# Patient Record
Sex: MALE | Race: BLACK OR AFRICAN AMERICAN | NOT HISPANIC OR LATINO
[De-identification: names, ages, dates, MRNs, and addresses within clinical notes are randomized per-mention and may not be internally consistent; named-entity substitution may affect disease eponyms.]

---

## 2017-07-19 ENCOUNTER — OTHER (OUTPATIENT)
Age: 82
End: 2017-07-19

## 2019-03-24 ENCOUNTER — INPATIENT (INPATIENT)
Facility: HOSPITAL | Age: 84
LOS: 4 days | Discharge: NURSING, REHAB OR LONG-TERM CARE | End: 2019-03-29
Attending: INTERNAL MEDICINE | Admitting: INTERNAL MEDICINE
Payer: MEDICARE

## 2019-03-24 VITALS
SYSTOLIC BLOOD PRESSURE: 122 MMHG | WEIGHT: 220.02 LBS | DIASTOLIC BLOOD PRESSURE: 70 MMHG | HEART RATE: 69 BPM | RESPIRATION RATE: 20 BRPM | OXYGEN SATURATION: 100 % | HEIGHT: 71 IN | TEMPERATURE: 98 F

## 2019-03-24 DIAGNOSIS — R60.1 GENERALIZED EDEMA: ICD-10-CM

## 2019-03-24 DIAGNOSIS — I10 ESSENTIAL (PRIMARY) HYPERTENSION: ICD-10-CM

## 2019-03-24 DIAGNOSIS — Z98.89 OTHER SPECIFIED POSTPROCEDURAL STATES: Chronic | ICD-10-CM

## 2019-03-24 DIAGNOSIS — M19.90 UNSPECIFIED OSTEOARTHRITIS, UNSPECIFIED SITE: ICD-10-CM

## 2019-03-24 LAB
ALBUMIN SERPL ELPH-MCNC: 3.4 G/DL — SIGNIFICANT CHANGE UP (ref 3.3–5)
ALP SERPL-CCNC: 72 U/L — SIGNIFICANT CHANGE UP (ref 40–120)
ALT FLD-CCNC: 17 U/L — SIGNIFICANT CHANGE UP (ref 12–78)
ANION GAP SERPL CALC-SCNC: 7 MMOL/L — SIGNIFICANT CHANGE UP (ref 5–17)
AST SERPL-CCNC: 18 U/L — SIGNIFICANT CHANGE UP (ref 15–37)
BASE EXCESS BLDA CALC-SCNC: 1 MMOL/L — SIGNIFICANT CHANGE UP (ref -2–2)
BASOPHILS # BLD AUTO: 0.03 K/UL — SIGNIFICANT CHANGE UP (ref 0–0.2)
BASOPHILS NFR BLD AUTO: 0.6 % — SIGNIFICANT CHANGE UP (ref 0–2)
BILIRUB SERPL-MCNC: 1 MG/DL — SIGNIFICANT CHANGE UP (ref 0.2–1.2)
BLD GP AB SCN SERPL QL: SIGNIFICANT CHANGE UP
BLOOD GAS COMMENTS: SIGNIFICANT CHANGE UP
BLOOD GAS COMMENTS: SIGNIFICANT CHANGE UP
BLOOD GAS SOURCE: SIGNIFICANT CHANGE UP
BUN SERPL-MCNC: 10 MG/DL — SIGNIFICANT CHANGE UP (ref 7–23)
CALCIUM SERPL-MCNC: 8.5 MG/DL — SIGNIFICANT CHANGE UP (ref 8.5–10.1)
CHLORIDE SERPL-SCNC: 105 MMOL/L — SIGNIFICANT CHANGE UP (ref 96–108)
CK MB CFR SERPL CALC: <1 NG/ML — SIGNIFICANT CHANGE UP (ref 0.5–3.6)
CO2 SERPL-SCNC: 28 MMOL/L — SIGNIFICANT CHANGE UP (ref 22–31)
CREAT SERPL-MCNC: 0.98 MG/DL — SIGNIFICANT CHANGE UP (ref 0.5–1.3)
EOSINOPHIL # BLD AUTO: 0.39 K/UL — SIGNIFICANT CHANGE UP (ref 0–0.5)
EOSINOPHIL NFR BLD AUTO: 7.4 % — HIGH (ref 0–6)
GLUCOSE SERPL-MCNC: 131 MG/DL — HIGH (ref 70–99)
HCO3 BLDA-SCNC: 26 MMOL/L — SIGNIFICANT CHANGE UP (ref 21–29)
HCT VFR BLD CALC: 47.6 % — SIGNIFICANT CHANGE UP (ref 39–50)
HGB BLD-MCNC: 14.9 G/DL — SIGNIFICANT CHANGE UP (ref 13–17)
HOROWITZ INDEX BLDA+IHG-RTO: 40 — SIGNIFICANT CHANGE UP
IMM GRANULOCYTES NFR BLD AUTO: 0.2 % — SIGNIFICANT CHANGE UP (ref 0–1.5)
INR BLD: 1.94 RATIO — HIGH (ref 0.88–1.16)
LYMPHOCYTES # BLD AUTO: 2.31 K/UL — SIGNIFICANT CHANGE UP (ref 1–3.3)
LYMPHOCYTES # BLD AUTO: 43.8 % — SIGNIFICANT CHANGE UP (ref 13–44)
MAGNESIUM SERPL-MCNC: 2.4 MG/DL — SIGNIFICANT CHANGE UP (ref 1.6–2.6)
MCHC RBC-ENTMCNC: 27.4 PG — SIGNIFICANT CHANGE UP (ref 27–34)
MCHC RBC-ENTMCNC: 31.3 GM/DL — LOW (ref 32–36)
MCV RBC AUTO: 87.5 FL — SIGNIFICANT CHANGE UP (ref 80–100)
MONOCYTES # BLD AUTO: 0.65 K/UL — SIGNIFICANT CHANGE UP (ref 0–0.9)
MONOCYTES NFR BLD AUTO: 12.3 % — SIGNIFICANT CHANGE UP (ref 2–14)
NEUTROPHILS # BLD AUTO: 1.88 K/UL — SIGNIFICANT CHANGE UP (ref 1.8–7.4)
NEUTROPHILS NFR BLD AUTO: 35.7 % — LOW (ref 43–77)
NRBC # BLD: 0 /100 WBCS — SIGNIFICANT CHANGE UP (ref 0–0)
NT-PROBNP SERPL-SCNC: 67 PG/ML — SIGNIFICANT CHANGE UP (ref 0–450)
PCO2 BLDA: 45 MMHG — SIGNIFICANT CHANGE UP (ref 32–46)
PH BLD: 7.38 — SIGNIFICANT CHANGE UP (ref 7.35–7.45)
PLATELET # BLD AUTO: 180 K/UL — SIGNIFICANT CHANGE UP (ref 150–400)
PO2 BLDA: 141 MMHG — HIGH (ref 74–108)
POTASSIUM SERPL-MCNC: 4 MMOL/L — SIGNIFICANT CHANGE UP (ref 3.5–5.3)
POTASSIUM SERPL-SCNC: 4 MMOL/L — SIGNIFICANT CHANGE UP (ref 3.5–5.3)
PROT SERPL-MCNC: 7.4 GM/DL — SIGNIFICANT CHANGE UP (ref 6–8.3)
PROTHROM AB SERPL-ACNC: 22.2 SEC — HIGH (ref 10–12.9)
RBC # BLD: 5.44 M/UL — SIGNIFICANT CHANGE UP (ref 4.2–5.8)
RBC # FLD: 15.7 % — HIGH (ref 10.3–14.5)
SAO2 % BLDA: 99 % — HIGH (ref 92–96)
SODIUM SERPL-SCNC: 140 MMOL/L — SIGNIFICANT CHANGE UP (ref 135–145)
TROPONIN I SERPL-MCNC: <.015 NG/ML — SIGNIFICANT CHANGE UP (ref 0.01–0.04)
TSH SERPL-MCNC: 2.14 UIU/ML — SIGNIFICANT CHANGE UP (ref 0.36–3.74)
WBC # BLD: 5.27 K/UL — SIGNIFICANT CHANGE UP (ref 3.8–10.5)
WBC # FLD AUTO: 5.27 K/UL — SIGNIFICANT CHANGE UP (ref 3.8–10.5)

## 2019-03-24 PROCEDURE — 71045 X-RAY EXAM CHEST 1 VIEW: CPT | Mod: 26

## 2019-03-24 PROCEDURE — 99285 EMERGENCY DEPT VISIT HI MDM: CPT

## 2019-03-24 PROCEDURE — 73502 X-RAY EXAM HIP UNI 2-3 VIEWS: CPT | Mod: 26,RT

## 2019-03-24 PROCEDURE — 93010 ELECTROCARDIOGRAM REPORT: CPT

## 2019-03-24 PROCEDURE — 93970 EXTREMITY STUDY: CPT | Mod: 26

## 2019-03-24 RX ORDER — ACETAMINOPHEN 500 MG
975 TABLET ORAL ONCE
Qty: 0 | Refills: 0 | Status: COMPLETED | OUTPATIENT
Start: 2019-03-24 | End: 2019-03-24

## 2019-03-24 RX ORDER — IPRATROPIUM/ALBUTEROL SULFATE 18-103MCG
3 AEROSOL WITH ADAPTER (GRAM) INHALATION ONCE
Qty: 0 | Refills: 0 | Status: COMPLETED | OUTPATIENT
Start: 2019-03-24 | End: 2019-03-24

## 2019-03-24 RX ORDER — ALBUTEROL 90 UG/1
2.5 AEROSOL, METERED ORAL ONCE
Qty: 0 | Refills: 0 | Status: COMPLETED | OUTPATIENT
Start: 2019-03-24 | End: 2019-03-24

## 2019-03-24 RX ORDER — TRAMADOL HYDROCHLORIDE 50 MG/1
50 TABLET ORAL
Qty: 0 | Refills: 0 | Status: DISCONTINUED | OUTPATIENT
Start: 2019-03-24 | End: 2019-03-29

## 2019-03-24 RX ORDER — CARVEDILOL PHOSPHATE 80 MG/1
3.12 CAPSULE, EXTENDED RELEASE ORAL EVERY 12 HOURS
Qty: 0 | Refills: 0 | Status: DISCONTINUED | OUTPATIENT
Start: 2019-03-24 | End: 2019-03-29

## 2019-03-24 RX ORDER — HEPARIN SODIUM 5000 [USP'U]/ML
5000 INJECTION INTRAVENOUS; SUBCUTANEOUS EVERY 12 HOURS
Qty: 0 | Refills: 0 | Status: DISCONTINUED | OUTPATIENT
Start: 2019-03-24 | End: 2019-03-29

## 2019-03-24 RX ORDER — ACETAMINOPHEN 500 MG
650 TABLET ORAL EVERY 6 HOURS
Qty: 0 | Refills: 0 | Status: DISCONTINUED | OUTPATIENT
Start: 2019-03-24 | End: 2019-03-29

## 2019-03-24 RX ORDER — AMLODIPINE BESYLATE 2.5 MG/1
5 TABLET ORAL DAILY
Qty: 0 | Refills: 0 | Status: DISCONTINUED | OUTPATIENT
Start: 2019-03-24 | End: 2019-03-24

## 2019-03-24 RX ORDER — FUROSEMIDE 40 MG
40 TABLET ORAL ONCE
Qty: 0 | Refills: 0 | Status: COMPLETED | OUTPATIENT
Start: 2019-03-24 | End: 2019-03-24

## 2019-03-24 RX ADMIN — ALBUTEROL 2.5 MILLIGRAM(S): 90 AEROSOL, METERED ORAL at 12:40

## 2019-03-24 RX ADMIN — Medication 40 MILLIGRAM(S): at 14:05

## 2019-03-24 RX ADMIN — Medication 3 MILLILITER(S): at 12:40

## 2019-03-24 RX ADMIN — TRAMADOL HYDROCHLORIDE 50 MILLIGRAM(S): 50 TABLET ORAL at 23:20

## 2019-03-24 RX ADMIN — Medication 975 MILLIGRAM(S): at 14:06

## 2019-03-24 NOTE — PHYSICAL THERAPY INITIAL EVALUATION ADULT - GENERAL OBSERVATIONS, REHAB EVAL
Patient supine in stretcher bed. + Cardiac monitor in place. Vital signs: 137/77 (), 70 HR, SaO2 96% on room air, denies shortness of breath at rest (Whitney RPE 0/10). AOx1.

## 2019-03-24 NOTE — ED PROVIDER NOTE - CLINICAL SUMMARY MEDICAL DECISION MAKING FREE TEXT BOX
pt pw leg pain. bl le edema. needs dvt rule out but likely chf. will diurese. not warm enough to suggest cellulitis. I read ekg as sinus rate 62 with first degree av block, left axis deviation, inferior q waves, no st elevation or depression, normal qtc and pr, qrs narrow. pt pw leg pain. bl le edema. needs dvt rule out but likely chf. will diurese. not warm enough to suggest cellulitis. I read ekg as sinus rate 62 with first degree av block, left axis deviation, inferior q waves, no st elevation or depression, normal qtc and pr, qrs narrow. labs reassuring. patient seen by PT and they recommend rehab. will admit.

## 2019-03-24 NOTE — H&P ADULT - NSICDXPASTMEDICALHX_GEN_ALL_CORE_FT
PAST MEDICAL HISTORY:  Cholelithiasis     Dementia     Depression     DVT (deep venous thrombosis) bilateral, on coumadin    HLD (hyperlipidemia)     Hypertension     OA (osteoarthritis)

## 2019-03-24 NOTE — PHYSICAL THERAPY INITIAL EVALUATION ADULT - PLANNED THERAPY INTERVENTIONS, PT EVAL
postural re-education/gait training/balance training/bed mobility training/ROM/stretching/strengthening/transfer training

## 2019-03-24 NOTE — PHYSICAL THERAPY INITIAL EVALUATION ADULT - MODALITIES TREATMENT COMMENTS
Whitney RPE at rest on room air: 96%, Whitney RPE after activity c evident dyspnea on exertion: 2/10.

## 2019-03-24 NOTE — PHYSICAL THERAPY INITIAL EVALUATION ADULT - GAIT TRAINING, PT EVAL
In 4 weeks, patient will independently ambulate c use of appropriate mobility device in and out of the house, 300 ft or more.

## 2019-03-24 NOTE — PHYSICAL THERAPY INITIAL EVALUATION ADULT - CRITERIA FOR SKILLED THERAPEUTIC INTERVENTIONS
rehab potential/anticipated equipment needs at discharge/anticipated discharge recommendation/impairments found/functional limitations in following categories/risk reduction/prevention

## 2019-03-24 NOTE — PHYSICAL THERAPY INITIAL EVALUATION ADULT - PERTINENT HX OF CURRENT PROBLEM, REHAB EVAL
Patient brought to [facility]-ED due to reports of 1 wk of right leg pain and swelling. Chart reviewed and noted Chest XRay showing increased cardiac silhouette; INR raised (noted hx of IVC filter), US doppler lower extremities ruled out DVT; Hip XRay ruled out fractures/dislocations. Noted left anterior shin open skin tear with minimal serous discharge.

## 2019-03-24 NOTE — PATIENT PROFILE ADULT - NSASFALLNEEDSASSISTWITH_GEN_A_NUR
Patient arrived at clinic today for a blood pressure check.  Blood pressure taken per protocol.     Patient states that she has some pressure in her left ear. She states that she has some sinus pressure and is coughing up green mucous.   RAMÓN Sandhu    Caller: Unspecified (Today,  9:30 AM)                Would like her to return to clinic for recheck of blood pressure. Orthostatics. Is the dizziness improved at all from when she came in?         128/60 Supine  132/62 Sitting  136/62 Standing     standing/toileting/walking

## 2019-03-24 NOTE — ED ADULT TRIAGE NOTE - CHIEF COMPLAINT QUOTE
ems states, " pt with bilateral leg pain and right hip pain , with bilateral swelling and pain for 1 week , denies sob, with some chest discomfort "

## 2019-03-24 NOTE — H&P ADULT - HISTORY OF PRESENT ILLNESS
patient  reports  L  leg  foot  pain  for  several weeks  worse  past  several  days  evaluated  in  Er  found  to be in  CHF  admitted  for  further  w/u  and treatmenty

## 2019-03-24 NOTE — PHYSICAL THERAPY INITIAL EVALUATION ADULT - IMPAIRMENTS FOUND, PT EVAL
joint integrity and mobility/muscle strength/ROM/gait, locomotion, and balance/aerobic capacity/endurance/integumentary integrity/ventilation and respiration/gas exchange

## 2019-03-24 NOTE — H&P ADULT - NSHPPHYSICALEXAM_GEN_ALL_CORE
PHYSICAL EXAM:    GENERAL: NAD, well-groomed, well-developed  HEAD:  Atraumatic, Normocephalic  EYES: EOMI, PERRLA, conjunctiva and sclera clear  ENMT: No tonsillar erythema, exudates, or enlargement; Moist mucous membranes, , No lesions  NECK: Supple, No JVD, Normal thyroid  NERVOUS SYSTEM:  Alert & Oriented X 2  no  focal  deficits  CHEST/LUNG: Clear  bilaterally; No rales, rhonchi, wheezing, or rubs  HEART: Regular rate and rhythm; No murmurs, rubs, or gallops  ABDOMEN: Soft, Nontender, Nondistended; no  masses Bowel sounds present  EXTREMITIES:  + Peripheral Pulses, No clubbing, cyanosis, or edema  LYMPH: No lymphadenopathy noted   RECTAL: deferred

## 2019-03-24 NOTE — H&P ADULT - NSHPLABSRESULTS_GEN_ALL_CORE
LABS:                        14.9   5.27  )-----------( 180      ( 24 Mar 2019 13:17 )             47.6     03-24    140  |  105  |  10  ----------------------------<  131<H>  4.0   |  28  |  0.98    Ca    8.5      24 Mar 2019 14:17  Mg     2.4     03-24    TPro  7.4  /  Alb  3.4  /  TBili  1.0  /  DBili  x   /  AST  18  /  ALT  17  /  AlkPhos  72  03-24    PT/INR - ( 24 Mar 2019 14:17 )   PT: 22.2 sec;   INR: 1.94 ratio

## 2019-03-24 NOTE — ED PROVIDER NOTE - OBJECTIVE STATEMENT
Pertinent PMH/PSH/FHx/SHx and Review of Systems contained within:  89m who denies med hx pw hip pain and leg pain ongoing for approx 1 week. he notes he lives with family. denies ha, vision loss, rhinorrhea, cp, sob, rash, bleeding, weakness, fever, chills, dysuria, vomiting. nothing has been given for pain  Fh and Sh not otherwise contributory  ROS otherwise negative

## 2019-03-24 NOTE — PHYSICAL THERAPY INITIAL EVALUATION ADULT - FINE MOTOR COORDINATION EXAM
Right UE/but with intention tremors noted while holding on rolling walker during gait assessment/Left UE

## 2019-03-24 NOTE — H&P ADULT - ASSESSMENT
IMPROVE VTE Individual Risk Assessment    RISK                                                                Points    [ x ] Previous VTE                                                  3    [  ] Thrombophilia                                               2    [  ] Lower limb paralysis                                      2        (unable to hold up >15 seconds)      [  ] Current Cancer                                              2         (within 6 months)    [ x ] Immobilization > 24 hrs                                1    [  ] ICU/CCU stay > 24 hours                              1    [ x ] Age > 60                                                      1    IMPROVE VTE Score _____5____

## 2019-03-24 NOTE — PHYSICAL THERAPY INITIAL EVALUATION ADULT - SKIN INTEGRITY
skin tear/to anterior shin, approximately measuring 1x1 cm -- serous discharge, no foul odor with grossly edematous legs

## 2019-03-24 NOTE — CONSULT NOTE ADULT - SUBJECTIVE AND OBJECTIVE BOX
HPI:  HPI:  patient  reports  L  leg  foot  pain  for  several weeks  worse  past  several  days  evaluated  in  [facility]  found  to be in  CHF  admitted  for  further  w/u  and treatmenty (24 Mar 2019 18:17)      Chief Complaint:  Patient is a 89y old  Male who presents with a chief complaint of congestive  heart  failure leg  pain  unsteady  gait (24 Mar 2019 18:17)      Review of Systems:    General:  No wt loss, fevers, chills, night sweats  Eyes:  Good vision, no reported pain  ENT:  No sore throat, pain, runny nose, dysphagia  CV:  No pain, palpitations, hypo/hypertension  Resp:  No dyspnea, cough, tachypnea, wheezing  GI:  No pain, nausea, vomiting, diarrhea, constipation  :  No pain, bleeding, incontinence, nocturia  Muscle:  No pain, weakness         Social History/Family History  SOCHX:   tobacco,  -  alcohol    FMHX: FA/MO  - contributory       Discussed with:  PMD, Family    Physical Exam:    Vital Signs:  Vital Signs Last 24 Hrs  T(C): 36.7 (24 Mar 2019 18:20), Max: 36.7 (24 Mar 2019 18:20)  T(F): 98.1 (24 Mar 2019 18:20), Max: 98.1 (24 Mar 2019 18:20)  HR: 70 (24 Mar 2019 18:20) (69 - 72)  BP: 120/65 (24 Mar 2019 18:20) (120/65 - 140/70)  BP(mean): --  RR: 18 (24 Mar 2019 18:20) (11 - 20)  SpO2: 98% (24 Mar 2019 18:20) (98% - 100%)  Daily Height in cm: 180.34 (24 Mar 2019 12:16)    Daily I&O's Summary        HEENT:  NC/AT, patent nares w/ pink mucosa, OP clear w/o lesions, PERRL, EOMI, conjunctivae clear, no thyromegaly, nodules, adenopathy, no JVD  Chest:  Full & symmetric excursion, no increased effort, breath sounds clear  Cardiovascular:  Regular rhythm, S1, S2, no murmur/rub/S3/S4, n edema  Abdomen:  Soft, non-tender, non-distended, normoactive bowel sounds, no HSM      Laboratory:                          14.9   5.27  )-----------( 180      ( 24 Mar 2019 13:17 )             47.6     03-24    140  |  105  |  10  ----------------------------<  131<H>  4.0   |  28  |  0.98    Ca    8.5      24 Mar 2019 14:17  Mg     2.4     03-24    TPro  7.4  /  Alb  3.4  /  TBili  1.0  /  DBili  x   /  AST  18  /  ALT  17  /  AlkPhos  72  03-24    ABG - ( 24 Mar 2019 12:57 )  pH, Arterial: x     pH, Blood: 7.38  /  pCO2: 45    /  pO2: 141   / HCO3: 26    / Base Excess: 1.0   /  SaO2: 99                CARDIAC MARKERS ( 24 Mar 2019 14:17 )  <.015 ng/mL / x     / x     / x     / <1.0 ng/mL      CAPILLARY BLOOD GLUCOSE        LIVER FUNCTIONS - ( 24 Mar 2019 14:17 )  Alb: 3.4 g/dL / Pro: 7.4 gm/dL / ALK PHOS: 72 U/L / ALT: 17 U/L / AST: 18 U/L / GGT: x           PT/INR - ( 24 Mar 2019 14:17 )   PT: 22.2 sec;   INR: 1.94 ratio        Assessment:  Hip pain  Edema  Acute on chronic diastolic CHF in setting of chronic AFIB  AC continues  TTE pending  1 dose LAsix  Norvasc held as a cause of peripheral edema  May increase Coreg as needed for SBP

## 2019-03-24 NOTE — PHYSICAL THERAPY INITIAL EVALUATION ADULT - ADDITIONAL COMMENTS
As per patient's son over phone, patient lives c adult children in private house c about 7 stair steps to enter. Patient's bedroom is at 3rd floor. Patient was able to negotiate stairs. More shortness of breath noted more recently, coming up to this admission. Reports lower legs often get swollen. Denies use of mobility devices at home but requires to hold on things or someone for stability. Has 7 days 4-7 hours of home health aide services.

## 2019-03-24 NOTE — ED PROVIDER NOTE - PHYSICAL EXAMINATION
Gen: Alert, NAD  Head: NC, AT   Eyes: PERRL, EOMI, normal lids/conjunctiva  ENT: decreased hearing  Neck: supple, no tenderness, Trachea midline  Pulm: crackles bl lung bases. end expiratory wheezes   CV: RRR. pitting edema bl lower to mid thigh.   Abd: soft, NT/ND, +BS, no hepatosplenomegaly  Mskel: slightly short and ext rotated rle  Skin: weeping of left leg edema  Neuro: alert and oriented to self only

## 2019-03-25 LAB
ALBUMIN SERPL ELPH-MCNC: 3.4 G/DL — SIGNIFICANT CHANGE UP (ref 3.3–5)
ALP SERPL-CCNC: 71 U/L — SIGNIFICANT CHANGE UP (ref 40–120)
ALT FLD-CCNC: 17 U/L — SIGNIFICANT CHANGE UP (ref 12–78)
ANION GAP SERPL CALC-SCNC: 7 MMOL/L — SIGNIFICANT CHANGE UP (ref 5–17)
APPEARANCE UR: CLEAR — SIGNIFICANT CHANGE UP
AST SERPL-CCNC: 19 U/L — SIGNIFICANT CHANGE UP (ref 15–37)
BACTERIA # UR AUTO: ABNORMAL
BILIRUB SERPL-MCNC: 1.4 MG/DL — HIGH (ref 0.2–1.2)
BILIRUB UR-MCNC: NEGATIVE — SIGNIFICANT CHANGE UP
BUN SERPL-MCNC: 11 MG/DL — SIGNIFICANT CHANGE UP (ref 7–23)
CALCIUM SERPL-MCNC: 8.7 MG/DL — SIGNIFICANT CHANGE UP (ref 8.5–10.1)
CHLORIDE SERPL-SCNC: 105 MMOL/L — SIGNIFICANT CHANGE UP (ref 96–108)
CO2 SERPL-SCNC: 28 MMOL/L — SIGNIFICANT CHANGE UP (ref 22–31)
COLOR SPEC: YELLOW — SIGNIFICANT CHANGE UP
CREAT SERPL-MCNC: 0.98 MG/DL — SIGNIFICANT CHANGE UP (ref 0.5–1.3)
DIFF PNL FLD: ABNORMAL
GLUCOSE SERPL-MCNC: 87 MG/DL — SIGNIFICANT CHANGE UP (ref 70–99)
GLUCOSE UR QL: NEGATIVE MG/DL — SIGNIFICANT CHANGE UP
HCT VFR BLD CALC: 46.1 % — SIGNIFICANT CHANGE UP (ref 39–50)
HGB BLD-MCNC: 14.7 G/DL — SIGNIFICANT CHANGE UP (ref 13–17)
KETONES UR-MCNC: NEGATIVE — SIGNIFICANT CHANGE UP
LEUKOCYTE ESTERASE UR-ACNC: NEGATIVE — SIGNIFICANT CHANGE UP
MCHC RBC-ENTMCNC: 27.5 PG — SIGNIFICANT CHANGE UP (ref 27–34)
MCHC RBC-ENTMCNC: 31.9 GM/DL — LOW (ref 32–36)
MCV RBC AUTO: 86.3 FL — SIGNIFICANT CHANGE UP (ref 80–100)
NITRITE UR-MCNC: NEGATIVE — SIGNIFICANT CHANGE UP
NRBC # BLD: 0 /100 WBCS — SIGNIFICANT CHANGE UP (ref 0–0)
PH UR: 8 — SIGNIFICANT CHANGE UP (ref 5–8)
PLATELET # BLD AUTO: 113 K/UL — LOW (ref 150–400)
POTASSIUM SERPL-MCNC: 4.2 MMOL/L — SIGNIFICANT CHANGE UP (ref 3.5–5.3)
POTASSIUM SERPL-SCNC: 4.2 MMOL/L — SIGNIFICANT CHANGE UP (ref 3.5–5.3)
PROT SERPL-MCNC: 7.4 GM/DL — SIGNIFICANT CHANGE UP (ref 6–8.3)
PROT UR-MCNC: NEGATIVE MG/DL — SIGNIFICANT CHANGE UP
RBC # BLD: 5.34 M/UL — SIGNIFICANT CHANGE UP (ref 4.2–5.8)
RBC # FLD: 15.8 % — HIGH (ref 10.3–14.5)
RBC CASTS # UR COMP ASSIST: ABNORMAL /HPF (ref 0–4)
SODIUM SERPL-SCNC: 140 MMOL/L — SIGNIFICANT CHANGE UP (ref 135–145)
SP GR SPEC: 1.01 — SIGNIFICANT CHANGE UP (ref 1.01–1.02)
T3 SERPL-MCNC: 119 NG/DL — SIGNIFICANT CHANGE UP (ref 80–200)
T4 AB SER-ACNC: 7.8 UG/DL — SIGNIFICANT CHANGE UP (ref 4.6–12)
UROBILINOGEN FLD QL: NEGATIVE MG/DL — SIGNIFICANT CHANGE UP
WBC # BLD: 4.99 K/UL — SIGNIFICANT CHANGE UP (ref 3.8–10.5)
WBC # FLD AUTO: 4.99 K/UL — SIGNIFICANT CHANGE UP (ref 3.8–10.5)

## 2019-03-25 RX ORDER — IPRATROPIUM/ALBUTEROL SULFATE 18-103MCG
3 AEROSOL WITH ADAPTER (GRAM) INHALATION EVERY 6 HOURS
Qty: 0 | Refills: 0 | Status: DISCONTINUED | OUTPATIENT
Start: 2019-03-25 | End: 2019-03-29

## 2019-03-25 RX ORDER — FUROSEMIDE 40 MG
20 TABLET ORAL DAILY
Qty: 0 | Refills: 0 | Status: DISCONTINUED | OUTPATIENT
Start: 2019-03-25 | End: 2019-03-29

## 2019-03-25 RX ORDER — OLANZAPINE 15 MG/1
5 TABLET, FILM COATED ORAL ONCE
Qty: 0 | Refills: 0 | Status: COMPLETED | OUTPATIENT
Start: 2019-03-25 | End: 2019-03-25

## 2019-03-25 RX ADMIN — HEPARIN SODIUM 5000 UNIT(S): 5000 INJECTION INTRAVENOUS; SUBCUTANEOUS at 17:39

## 2019-03-25 RX ADMIN — CARVEDILOL PHOSPHATE 3.12 MILLIGRAM(S): 80 CAPSULE, EXTENDED RELEASE ORAL at 17:39

## 2019-03-25 RX ADMIN — CARVEDILOL PHOSPHATE 3.12 MILLIGRAM(S): 80 CAPSULE, EXTENDED RELEASE ORAL at 05:42

## 2019-03-25 RX ADMIN — Medication 20 MILLIGRAM(S): at 08:57

## 2019-03-25 RX ADMIN — Medication 3 MILLILITER(S): at 11:19

## 2019-03-25 RX ADMIN — Medication 3 MILLILITER(S): at 08:39

## 2019-03-25 RX ADMIN — TRAMADOL HYDROCHLORIDE 50 MILLIGRAM(S): 50 TABLET ORAL at 00:20

## 2019-03-25 RX ADMIN — Medication 3 MILLILITER(S): at 19:08

## 2019-03-25 RX ADMIN — HEPARIN SODIUM 5000 UNIT(S): 5000 INJECTION INTRAVENOUS; SUBCUTANEOUS at 05:42

## 2019-03-25 RX ADMIN — OLANZAPINE 5 MILLIGRAM(S): 15 TABLET, FILM COATED ORAL at 21:33

## 2019-03-25 NOTE — CHART NOTE - NSCHARTNOTEFT_GEN_A_CORE
Medicine Hospitalist PA    Notified by RN pt c/o sob. Pt seen and examined, sitting in bed comfortably eating breakfast, sob now resolved. As per RN earlier bed was loweed 45 angle to 20 and pt started to have trouble breathing. Re-raised bed up to 45 degrees. Now pt feeling better, no sob, no complaints.    Vital Signs Last 24 Hrs  T(C): 36.7 (25 Mar 2019 05:10), Max: 36.7 (24 Mar 2019 18:20)  T(F): 98 (25 Mar 2019 05:10), Max: 98.1 (24 Mar 2019 18:20)  HR: 84 (25 Mar 2019 08:40) (65 - 86)  BP: 122/76 (25 Mar 2019 05:10) (120/65 - 140/70)  RR: 17 (25 Mar 2019 05:10) (11 - 20)  SpO2: 97% (25 Mar 2019 08:40) (97% - 100%)    General: awake, sitting in bed comfortably  HEENT: EOM intact, HUMPHREY, tongue midline   CV: S1 S2  Resp: Good air entry b/l, mild exp wheezing lower bases  Abd: Soft, NT/ND, BS+  Ext: pitting LE edema, pulses intact, no calf tenderness  Neuro: good finger , no arm/leg drift, sensations intact, no facial droop     A/P: 89 YOM with PMHx of CHF now admitted with CHF/Edema  - STAT Duonebs q6hrs  - STAT Lasix 20mg PO daily  - pending TTE  - Continue coreg  - CXR clear suring admission  - Continue to monitor

## 2019-03-26 LAB
ALBUMIN SERPL ELPH-MCNC: 3.7 G/DL — SIGNIFICANT CHANGE UP (ref 3.3–5)
ALP SERPL-CCNC: 82 U/L — SIGNIFICANT CHANGE UP (ref 40–120)
ALT FLD-CCNC: 20 U/L — SIGNIFICANT CHANGE UP (ref 12–78)
ANION GAP SERPL CALC-SCNC: 8 MMOL/L — SIGNIFICANT CHANGE UP (ref 5–17)
AST SERPL-CCNC: 23 U/L — SIGNIFICANT CHANGE UP (ref 15–37)
BILIRUB SERPL-MCNC: 1.5 MG/DL — HIGH (ref 0.2–1.2)
BUN SERPL-MCNC: 13 MG/DL — SIGNIFICANT CHANGE UP (ref 7–23)
CALCIUM SERPL-MCNC: 8.9 MG/DL — SIGNIFICANT CHANGE UP (ref 8.5–10.1)
CHLORIDE SERPL-SCNC: 107 MMOL/L — SIGNIFICANT CHANGE UP (ref 96–108)
CO2 SERPL-SCNC: 27 MMOL/L — SIGNIFICANT CHANGE UP (ref 22–31)
CREAT SERPL-MCNC: 1.06 MG/DL — SIGNIFICANT CHANGE UP (ref 0.5–1.3)
GLUCOSE SERPL-MCNC: 87 MG/DL — SIGNIFICANT CHANGE UP (ref 70–99)
HCT VFR BLD CALC: 48.9 % — SIGNIFICANT CHANGE UP (ref 39–50)
HGB BLD-MCNC: 15.3 G/DL — SIGNIFICANT CHANGE UP (ref 13–17)
MCHC RBC-ENTMCNC: 27.2 PG — SIGNIFICANT CHANGE UP (ref 27–34)
MCHC RBC-ENTMCNC: 31.3 GM/DL — LOW (ref 32–36)
MCV RBC AUTO: 86.9 FL — SIGNIFICANT CHANGE UP (ref 80–100)
NRBC # BLD: 0 /100 WBCS — SIGNIFICANT CHANGE UP (ref 0–0)
PLATELET # BLD AUTO: 172 K/UL — SIGNIFICANT CHANGE UP (ref 150–400)
POTASSIUM SERPL-MCNC: 3.9 MMOL/L — SIGNIFICANT CHANGE UP (ref 3.5–5.3)
POTASSIUM SERPL-SCNC: 3.9 MMOL/L — SIGNIFICANT CHANGE UP (ref 3.5–5.3)
PROT SERPL-MCNC: 8.2 GM/DL — SIGNIFICANT CHANGE UP (ref 6–8.3)
RBC # BLD: 5.63 M/UL — SIGNIFICANT CHANGE UP (ref 4.2–5.8)
RBC # FLD: 15.6 % — HIGH (ref 10.3–14.5)
SODIUM SERPL-SCNC: 142 MMOL/L — SIGNIFICANT CHANGE UP (ref 135–145)
WBC # BLD: 6.87 K/UL — SIGNIFICANT CHANGE UP (ref 3.8–10.5)
WBC # FLD AUTO: 6.87 K/UL — SIGNIFICANT CHANGE UP (ref 3.8–10.5)

## 2019-03-26 PROCEDURE — 93306 TTE W/DOPPLER COMPLETE: CPT | Mod: 26

## 2019-03-26 RX ADMIN — Medication 3 MILLILITER(S): at 00:58

## 2019-03-26 RX ADMIN — TRAMADOL HYDROCHLORIDE 50 MILLIGRAM(S): 50 TABLET ORAL at 21:26

## 2019-03-26 RX ADMIN — HEPARIN SODIUM 5000 UNIT(S): 5000 INJECTION INTRAVENOUS; SUBCUTANEOUS at 18:43

## 2019-03-26 RX ADMIN — Medication 3 MILLILITER(S): at 23:17

## 2019-03-26 RX ADMIN — CARVEDILOL PHOSPHATE 3.12 MILLIGRAM(S): 80 CAPSULE, EXTENDED RELEASE ORAL at 07:02

## 2019-03-26 RX ADMIN — TRAMADOL HYDROCHLORIDE 50 MILLIGRAM(S): 50 TABLET ORAL at 23:13

## 2019-03-26 RX ADMIN — TRAMADOL HYDROCHLORIDE 50 MILLIGRAM(S): 50 TABLET ORAL at 07:08

## 2019-03-26 RX ADMIN — HEPARIN SODIUM 5000 UNIT(S): 5000 INJECTION INTRAVENOUS; SUBCUTANEOUS at 07:01

## 2019-03-26 RX ADMIN — CARVEDILOL PHOSPHATE 3.12 MILLIGRAM(S): 80 CAPSULE, EXTENDED RELEASE ORAL at 18:43

## 2019-03-26 RX ADMIN — Medication 3 MILLILITER(S): at 05:18

## 2019-03-26 RX ADMIN — Medication 20 MILLIGRAM(S): at 07:01

## 2019-03-26 RX ADMIN — Medication 3 MILLILITER(S): at 17:00

## 2019-03-26 RX ADMIN — Medication 3 MILLILITER(S): at 11:02

## 2019-03-27 LAB — T PALLIDUM AB TITR SER: NEGATIVE — SIGNIFICANT CHANGE UP

## 2019-03-27 PROCEDURE — 70450 CT HEAD/BRAIN W/O DYE: CPT | Mod: 26

## 2019-03-27 RX ORDER — LANOLIN ALCOHOL/MO/W.PET/CERES
3 CREAM (GRAM) TOPICAL AT BEDTIME
Qty: 0 | Refills: 0 | Status: DISCONTINUED | OUTPATIENT
Start: 2019-03-27 | End: 2019-03-29

## 2019-03-27 RX ORDER — HALOPERIDOL DECANOATE 100 MG/ML
2 INJECTION INTRAMUSCULAR ONCE
Qty: 0 | Refills: 0 | Status: COMPLETED | OUTPATIENT
Start: 2019-03-27 | End: 2019-03-27

## 2019-03-27 RX ADMIN — Medication 3 MILLILITER(S): at 06:04

## 2019-03-27 RX ADMIN — Medication 3 MILLILITER(S): at 11:01

## 2019-03-27 RX ADMIN — CARVEDILOL PHOSPHATE 3.12 MILLIGRAM(S): 80 CAPSULE, EXTENDED RELEASE ORAL at 05:55

## 2019-03-27 RX ADMIN — HEPARIN SODIUM 5000 UNIT(S): 5000 INJECTION INTRAVENOUS; SUBCUTANEOUS at 05:57

## 2019-03-27 RX ADMIN — TRAMADOL HYDROCHLORIDE 50 MILLIGRAM(S): 50 TABLET ORAL at 22:32

## 2019-03-27 RX ADMIN — CARVEDILOL PHOSPHATE 3.12 MILLIGRAM(S): 80 CAPSULE, EXTENDED RELEASE ORAL at 17:20

## 2019-03-27 RX ADMIN — HEPARIN SODIUM 5000 UNIT(S): 5000 INJECTION INTRAVENOUS; SUBCUTANEOUS at 17:20

## 2019-03-27 RX ADMIN — Medication 3 MILLIGRAM(S): at 21:51

## 2019-03-27 RX ADMIN — Medication 20 MILLIGRAM(S): at 06:01

## 2019-03-27 RX ADMIN — HALOPERIDOL DECANOATE 2 MILLIGRAM(S): 100 INJECTION INTRAMUSCULAR at 16:05

## 2019-03-27 RX ADMIN — TRAMADOL HYDROCHLORIDE 50 MILLIGRAM(S): 50 TABLET ORAL at 21:53

## 2019-03-27 RX ADMIN — Medication 3 MILLILITER(S): at 17:02

## 2019-03-27 NOTE — CONSULT NOTE ADULT - ASSESSMENT
Subjective Complaints:      Consult requested by ER doctor:                  Attending:     History of Present Illness:  Chief Complaint/Reason for Admission:  History of Present Illness:  HPI:  patient  reports  L  leg  foot  pain  for  several weeks  worse  past  several  days  evaluated  in  Er  found  to be in  CHF  admitted  for  further  w/u  and treatmenty (24 Mar 2019 18:17)        PAST MEDICAL & SURGICAL HISTORY:  Cholelithiasis  OA (osteoarthritis)  HLD (hyperlipidemia)  Dementia  Depression  Hypertension  DVT (deep venous thrombosis): bilateral, on coumadin  S/P IVC filter  89yMale    MEDICATIONS  (STANDING):  ALBUTerol/ipratropium for Nebulization 3 milliLiter(s) Nebulizer every 6 hours  carvedilol 3.125 milliGRAM(s) Oral every 12 hours  furosemide    Tablet 20 milliGRAM(s) Oral daily  heparin  Injectable 5000 Unit(s) SubCutaneous every 12 hours    MEDICATIONS  (PRN):  acetaminophen   Tablet .. 650 milliGRAM(s) Oral every 6 hours PRN Mild Pain (1 - 3)  melatonin 3 milliGRAM(s) Oral at bedtime PRN Insomnia  traMADol 50 milliGRAM(s) Oral four times a day PRN Severe Pain (7 - 10)  traMADol 50 milliGRAM(s) Oral two times a day PRN Moderate Pain      Allergies    No Known Allergies    Intolerances      FAMILY HISTORY:  No pertinent family history      REVIEW OF SYSTEMS:  General:  No wt loss, fevers, chills, night sweats  Eyes:  Good vision, no reported pain  ENT:  No sore throat, pain, runny nose, dysphagia  CV:  No pain, palpitatioins, hypo/hypertension  Resp:  No dyspnea, cough, tachypnea, wheezing  GI:  No pain, nausea, vomiting, diarrhea, constipatiion  :  No pain, bleeding, incontinence, nocturia  Muscle:  No pain, weakness  Breast:  No pain, abscess, mass, discharge  Neuro:  No weakness, tingling, memory problems  Psych:  No fatigue, insomnia, mood problems, depression  Endocrine:  No polyuria, polydypsia, cold/heat intolerance  Heme:  No petechiae, ecchymosis, easy bruisability  Skin:  No rash, tattoos, scars, edema      Vital Signs Last 24 Hrs  T(C): 36.1 (27 Mar 2019 17:00), Max: 37 (27 Mar 2019 05:49)  T(F): 97 (27 Mar 2019 17:00), Max: 98.6 (27 Mar 2019 05:49)  HR: 77 (27 Mar 2019 17:02) (70 - 109)  BP: 128/87 (27 Mar 2019 17:00) (126/65 - 129/77)  BP(mean): --  RR: 21 (27 Mar 2019 17:00) (19 - 21)  SpO2: 96% (27 Mar 2019 17:02) (94% - 98%)    GENERAL PHYSICAL EXAM:  General:  Appears stated age, well-groomed, well-nourished, no distress  HEENT:  NC/AT, patent nares w/ pink mucosa, OP clear w/o lesions, PERRL, EOMI, conjunctivae clear, no thyromegaly, nodules, adenopathy, no JVD  Chest:  Full & symmetric excursion, no increased effort, breath sounds clear  Cardiovascular:  Regular rhythm, S1, S2, no murmur/rub/S3/S4, no carotid/femoral/abdominal bruit, radial/pedal pulses 2+, no edema  Abdomen:  Soft, non-tender, non-distended, normoactive bowel sounds, no HSM  Extremities:  Gait & station:   Digits:   Nails:   Joints, Bones, Muscles:   ROM:   Stability:  Skin:  No rash/erythema/ecchymoses/petechiae/wounds/abscess/warm/dry  Musculoskeletal:  Full ROM in all joints w/o swelling/tenderness/effusion    NEUROLOGICAL EXAM:  HENT:  Normocephalic head; atraumatic head.  Neck supple.  ENT: normal looking.  Mental State:    Alert. t.   awake  follws commands poor memory   Cranial Nerves:  II-XII:   Pupils round and reactive to light and accommodation.  Extraocular movements full.  Visual fields full (no homonymous hemianopsia). .  Facial symmetry intact.  Tongue midline.  Motor Functions:   arm 4/5 legs swollen  r/o dvt  chf   Sensory Functions:   Intact to touch and pinprick to face and extremities.    Reflexes:  Deep tendon reflexes normoactive to biceps, knees and ankles.  Babinski absent (present).  Cerebellar Testing:    Finger to nose intact.  Nystagmus absent.  Neurovascular: Carotid auscultation full without bruits.      LABS:                        15.3   6.87  )-----------( 172      ( 26 Mar 2019 07:23 )             48.9     03-26    142  |  107  |  13  ----------------------------<  87  3.9   |  27  |  1.06    Ca    8.9      26 Mar 2019 07:23    TPro  8.2  /  Alb  3.7  /  TBili  1.5<H>  /  DBili  x   /  AST  23  /  ALT  20  /  AlkPhos  82  03-26            RADIOLOGY & ADDITIONAL STUDIES:      Assessment & Opinion:events noted  awaekmalert speech fluent legs swollen hx of ivc filter  legs swollen r/o dvt venous dioppler  negative  was on coumadin as per family for dvt  ct head no acute path.  dementia for pt sub acute rehab     Recommendations:  Brain MRI.  Carotid doppler.  Echocardiogram.  EEG.   DVT prophylaxis as ordered. tsh   b12  will follow   Medications:

## 2019-03-27 NOTE — CHART NOTE - NSCHARTNOTEFT_GEN_A_CORE
Medicine Hospitalist PA    Called by RN to order coumadin for patient, as he was taking it outpatient. Discussed with Dr. Michaels, Doppler was performed during hospital visit which was negative for DVT. Also, patient is a high risk for fall, so AC is not advised. Informed RN.

## 2019-03-27 NOTE — CONSULT NOTE ADULT - SUBJECTIVE AND OBJECTIVE BOX
Patient is a 89y old  Male who presents with a chief complaint of congestive  heart  failure leg  pain  unsteady  gait (27 Mar 2019 07:33)    HPI: patient  reports  L  leg  foot  pain  for  several weeks  worse  past  several  days  evaluated  in  Er  found  to be in  CHF  admitted  for  further  w/u  and treatmenty (24 Mar 2019 18:17)    Has had delrium, episodes of confusion, aggressive behavior - Code barragan called - close observation required.    Currently laying in bed being fed. Denies any complaints. Denies pain, SOB.    REVIEW OF SYSTEMS as above but poor historian  Constitutional - No pain  Respiratory - See HPI  Cardiovascular - See HPI  Musculoskeletal - See HPI    PAST MEDICAL & SURGICAL HISTORY  Cholelithiasis  OA (osteoarthritis)  HLD (hyperlipidemia)  Dementia  Depression  Hypertension  DVT (deep venous thrombosis)  S/P IVC filter    SOCHX: Lives with son in house - 7 steps to enter, 2 flights up to his bedroom, was able to negotiate by himself. Ind ambulating  Denies tobacco, alcohol    FMHX: FA/MO  noncontributory     ALLERGIES  No Known Allergies      MEDICATIONS reviewed  MEDICATIONS  (STANDING):  ALBUTerol/ipratropium for Nebulization 3 milliLiter(s) Nebulizer every 6 hours  carvedilol 3.125 milliGRAM(s) Oral every 12 hours  furosemide    Tablet 20 milliGRAM(s) Oral daily  heparin  Injectable 5000 Unit(s) SubCutaneous every 12 hours    MEDICATIONS  (PRN):  acetaminophen   Tablet .. 650 milliGRAM(s) Oral every 6 hours PRN Mild Pain (1 - 3)  traMADol 50 milliGRAM(s) Oral four times a day PRN Severe Pain (7 - 10)  traMADol 50 milliGRAM(s) Oral two times a day PRN Moderate Pain      VITALS  T(C): 36.4 (03-27-19 @ 12:59), Max: 37 (03-27-19 @ 05:49)  HR: 82 (03-27-19 @ 12:21) (67 - 82)  BP: 126/65 (03-27-19 @ 12:21) (126/65 - 135/73)  RR: 19 (03-27-19 @ 12:21) (18 - 20)  SpO2: 95% (03-27-19 @ 12:21) (94% - 98%)  Wt(kg): -- 92 kg    PHYSICAL EXAM  BMI - 28.3  Constitutional - NAD, Comfortable in bed  HEENT - NCAT, EOMI  Neck - Supple, functional ROM  Cardiovascular - RRR  Abdomen - Soft, Not tender  Extremities - Functional range of motion X4, no edema. LE skin frail.  Neurologic -                    Cognitive - Awake, Alert, Oriented X2 - does not know year, incorrect age, thinks he is a hospital     Speech Intact     Language  Limited but grossly intact     Motor - No focal deficits     Sensory - Unreliable     Reflexes - DTR absent LEs     Psychiatric - Mood flat      RECENT LABS reviewed  CBC Full  -  ( 26 Mar 2019 07:23 )  WBC Count : 6.87 K/uL  RBC Count : 5.63 M/uL  Hemoglobin : 15.3 g/dL  Hematocrit : 48.9 %  Platelet Count - Automated : 172 K/uL  Mean Cell Volume : 86.9 fl  Mean Cell Hemoglobin : 27.2 pg  Mean Cell Hemoglobin Concentration : 31.3 gm/dL  03-26    142  |  107  |  13  ----------------------------<  87  3.9   |  27  |  1.06    Ca    8.9      26 Mar 2019 07:23    TPro  8.2  /  Alb  3.7  /  TBili  1.5<H>  /  DBili  x   /  AST  23  /  ALT  20  /  AlkPhos  82  03-26    IMAGING reviewed:  CXR - clear  CT Head - chronic atrophy - no acute findings  xray R hip - no fracture  BLE Dopplers - no DVT    FUNCTIONAL HISTORY Ind amb, stairs to 3rd floor bedroom.  HHA several hours/day.    CURRENT FUNCTIONAL STATUS Amb 5 steps with assistance of therapist    IMPRESSION: 89 M with Hx dementia but who was fairly independent ambulator/stairs, admitted with acute on chronic heart failure, LE pain of unclear etiology but may be related to failure. Aggressive behavior likely related to mild dementia exacerbated by heart failure, unfamiliar environment     PLAN: Will follow - recommendations will depend upon clinical and functional course.

## 2019-03-28 ENCOUNTER — TRANSCRIPTION ENCOUNTER (OUTPATIENT)
Age: 84
End: 2019-03-28

## 2019-03-28 RX ORDER — FUROSEMIDE 40 MG
1 TABLET ORAL
Qty: 0 | Refills: 0 | DISCHARGE
Start: 2019-03-28

## 2019-03-28 RX ORDER — HEPARIN SODIUM 5000 [USP'U]/ML
5000 INJECTION INTRAVENOUS; SUBCUTANEOUS
Qty: 0 | Refills: 0 | DISCHARGE
Start: 2019-03-28

## 2019-03-28 RX ORDER — LANOLIN ALCOHOL/MO/W.PET/CERES
1 CREAM (GRAM) TOPICAL
Qty: 0 | Refills: 0 | DISCHARGE
Start: 2019-03-28

## 2019-03-28 RX ADMIN — Medication 3 MILLILITER(S): at 11:02

## 2019-03-28 RX ADMIN — Medication 20 MILLIGRAM(S): at 06:02

## 2019-03-28 RX ADMIN — Medication 3 MILLILITER(S): at 00:10

## 2019-03-28 RX ADMIN — HEPARIN SODIUM 5000 UNIT(S): 5000 INJECTION INTRAVENOUS; SUBCUTANEOUS at 06:01

## 2019-03-28 RX ADMIN — Medication 3 MILLILITER(S): at 17:17

## 2019-03-28 RX ADMIN — Medication 3 MILLILITER(S): at 23:19

## 2019-03-28 RX ADMIN — CARVEDILOL PHOSPHATE 3.12 MILLIGRAM(S): 80 CAPSULE, EXTENDED RELEASE ORAL at 17:50

## 2019-03-28 RX ADMIN — HEPARIN SODIUM 5000 UNIT(S): 5000 INJECTION INTRAVENOUS; SUBCUTANEOUS at 17:50

## 2019-03-28 RX ADMIN — Medication 3 MILLILITER(S): at 05:45

## 2019-03-28 RX ADMIN — CARVEDILOL PHOSPHATE 3.12 MILLIGRAM(S): 80 CAPSULE, EXTENDED RELEASE ORAL at 06:00

## 2019-03-28 NOTE — DISCHARGE NOTE PROVIDER - HOSPITAL COURSE
patient  treated  with  lasix  tramadol  for  pain  evaluated  by  cardiology neurology  physiatry PT  hospital  course  +  for  acute  delirium  requiring one  to one  observation  and  intervention  with one  dose of  haldol.      normal  EF on TTE no  DVT  no  clinical or  CT  evidence  of  CVA  norvasc D/Cd  controlled  with  Coreg  cmental  status  improved  dischaged  to  NH

## 2019-03-28 NOTE — DISCHARGE NOTE PROVIDER - NSDCCPCAREPLAN_GEN_ALL_CORE_FT
PRINCIPAL DISCHARGE DIAGNOSIS  Diagnosis: Generalized edema  Assessment and Plan of Treatment:       SECONDARY DISCHARGE DIAGNOSES  Diagnosis: Unsteady gait  Assessment and Plan of Treatment:     Diagnosis: Acute hyperactive delirium due to multiple etiologies  Assessment and Plan of Treatment:     Diagnosis: Hypertension  Assessment and Plan of Treatment: Hypertension    Diagnosis: OA (osteoarthritis)  Assessment and Plan of Treatment: OA (osteoarthritis)

## 2019-03-28 NOTE — PROGRESS NOTE ADULT - ASSESSMENT
Subjective Complaints:  Historian:             Vital Signs Last 24 Hrs  T(C): 36.1 (28 Mar 2019 18:00), Max: 36.7 (28 Mar 2019 17:41)  T(F): 97 (28 Mar 2019 18:00), Max: 98 (28 Mar 2019 17:41)  HR: 89 (28 Mar 2019 18:00) (64 - 102)  BP: 130/85 (28 Mar 2019 18:00) (106/74 - 149/88)  BP(mean): --  RR: 20 (28 Mar 2019 18:00) (18 - 20)  SpO2: 95% (28 Mar 2019 18:00) (92% - 99%)    GENERAL PHYSICAL EXAM:  General:  Appears stated age, well-groomed, well-nourished, no distress  HEENT:  NC/AT, patent nares w/ pink mucosa, OP clear w/o lesions, PERRL, EOMI, conjunctivae clear, no thyromegaly, nodules, adenopathy, no JVD  Chest:  Full & symmetric excursion, no increased effort, breath sounds clear  Cardiovascular:  Regular rhythm, S1, S2, no murmur/rub/S3/S4, no carotid/femoral/abdominal bruit, radial/pedal pulses 2+, no edema  Abdomen:  Soft, non-tender, non-distended, normoactive bowel sounds, no HSM  Extremities:  Gait & station:   Digits:   Nails:   Joints, Bones, Muscles:   ROM:   Stability:  Skin:  No rash/erythema/ecchymoses/petechiae/wounds/abscess/warm/dry  Musculoskeletal:  Full ROM in all joints w/o swelling/tenderness/effusion        LABS:                RADIOLOGY & ADDITIONAL STUDIES:        Neurology Progress Note:      Mental Status: awaeka lert speech fluent follws comnads       Cranial Nerves: 2 / 12 intact      Motor:   arm leg 3/5  legs swollen no dvt         Sensory: grossly intact      Cerebellar:  finger to  nose intact      Gait: unsteasy       Assesment/Plan: chf dementia legs swollen no dvt venous doppler negative  for tsh b12 eeg for suab cute rehab will folow  chf

## 2019-03-29 ENCOUNTER — TRANSCRIPTION ENCOUNTER (OUTPATIENT)
Age: 84
End: 2019-03-29

## 2019-03-29 VITALS
SYSTOLIC BLOOD PRESSURE: 118 MMHG | RESPIRATION RATE: 19 BRPM | HEART RATE: 81 BPM | DIASTOLIC BLOOD PRESSURE: 68 MMHG | OXYGEN SATURATION: 97 %

## 2019-03-29 LAB — VIT B12 SERPL-MCNC: 1953 PG/ML — HIGH (ref 232–1245)

## 2019-03-29 RX ADMIN — Medication 20 MILLIGRAM(S): at 09:54

## 2019-03-29 RX ADMIN — Medication 3 MILLILITER(S): at 11:20

## 2019-03-29 RX ADMIN — HEPARIN SODIUM 5000 UNIT(S): 5000 INJECTION INTRAVENOUS; SUBCUTANEOUS at 06:41

## 2019-03-29 RX ADMIN — Medication 3 MILLILITER(S): at 05:51

## 2019-03-29 RX ADMIN — CARVEDILOL PHOSPHATE 3.12 MILLIGRAM(S): 80 CAPSULE, EXTENDED RELEASE ORAL at 06:41

## 2019-03-29 NOTE — DISCHARGE NOTE NURSING/CASE MANAGEMENT/SOCIAL WORK - NSDCVIVACCINE_GEN_ALL_CORE_FT
Influenza , 2014/1/5 10:59 , Jose Juan Medina (RN)  Influenza , 2014/12/10 16:19 , Crystal Treadwell (RN)  Pneumococcal , 2014/1/5 10:59 , Jose Juan Medina (RN)

## 2019-03-29 NOTE — PROGRESS NOTE ADULT - REASON FOR ADMISSION
congestive  heart  failure leg  pain  unsteady  gait

## 2019-03-29 NOTE — PROGRESS NOTE ADULT - PROVIDER SPECIALTY LIST ADULT
Cardiology
Internal Medicine
Neurology
Palliative Care
Physiatry
Cardiology

## 2019-03-29 NOTE — PROGRESS NOTE ADULT - SUBJECTIVE AND OBJECTIVE BOX
89y old  Male who presents with a chief complaint of congestive  heart  failure leg  pain  unsteady  gait (24 Mar 2019 18:17)      Review of Systems:    General:  No wt loss, fevers, chills, night sweats  Eyes:  Good vision, no reported pain  ENT:  No sore throat, pain, runny nose, dysphagia  CV:  No pain, palpitations, hypo/hypertension  Resp:  No dyspnea, cough, tachypnea, wheezing  GI:  No pain, nausea, vomiting, diarrhea, constipation  :  No pain, bleeding, incontinence, nocturia  Muscle:  No pain, weakness         Social History/Family History  SOCHX:   tobacco,  -  alcohol    FMHX: FA/MO  - contributory       Discussed with:  PMD, Family    Physical Exam:    Vital Signs:  Vital Signs Last 24 Hrs  T(C): 36.7 (24 Mar 2019 18:20), Max: 36.7 (24 Mar 2019 18:20)  T(F): 98.1 (24 Mar 2019 18:20), Max: 98.1 (24 Mar 2019 18:20)  HR: 70 (24 Mar 2019 18:20) (69 - 72)  BP: 120/65 (24 Mar 2019 18:20) (120/65 - 140/70)  BP(mean): --  RR: 18 (24 Mar 2019 18:20) (11 - 20)  SpO2: 98% (24 Mar 2019 18:20) (98% - 100%)  Daily Height in cm: 180.34 (24 Mar 2019 12:16)    Daily I&O's Summary        HEENT:  NC/AT, patent nares w/ pink mucosa, OP clear w/o lesions, PERRL, EOMI, conjunctivae clear, no thyromegaly, nodules, adenopathy, no JVD  Chest:  Full & symmetric excursion, no increased effort, breath sounds clear  Cardiovascular:  Regular rhythm, S1, S2, no murmur/rub/S3/S4, n edema  Abdomen:  Soft, non-tender, non-distended, normoactive bowel sounds, no HSM      Laboratory:                          14.9   5.27  )-----------( 180      ( 24 Mar 2019 13:17 )             47.6     03-24    140  |  105  |  10  ----------------------------<  131<H>  4.0   |  28  |  0.98    Ca    8.5      24 Mar 2019 14:17  Mg     2.4     03-24    TPro  7.4  /  Alb  3.4  /  TBili  1.0  /  DBili  x   /  AST  18  /  ALT  17  /  AlkPhos  72  03-24    ABG - ( 24 Mar 2019 12:57 )  pH, Arterial: x     pH, Blood: 7.38  /  pCO2: 45    /  pO2: 141   / HCO3: 26    / Base Excess: 1.0   /  SaO2: 99                CARDIAC MARKERS ( 24 Mar 2019 14:17 )  <.015 ng/mL / x     / x     / x     / <1.0 ng/mL      CAPILLARY BLOOD GLUCOSE        LIVER FUNCTIONS - ( 24 Mar 2019 14:17 )  Alb: 3.4 g/dL / Pro: 7.4 gm/dL / ALK PHOS: 72 U/L / ALT: 17 U/L / AST: 18 U/L / GGT: x           PT/INR - ( 24 Mar 2019 14:17 )   PT: 22.2 sec;   INR: 1.94 ratio        Assessment:  Hip pain  Edema  Acute on chronic diastolic CHF in setting of chronic AFIB  AC continues  TTE   1 dose LAsix  Norvasc held as a cause of peripheral edema  May increase Coreg as needed for SBP  SBP and HR stable
89y old  Male who presents with a chief complaint of congestive  heart  failure leg  pain  unsteady  gait (24 Mar 2019 18:17)      Review of Systems:    General:  No wt loss, fevers, chills, night sweats  Eyes:  Good vision, no reported pain  ENT:  No sore throat, pain, runny nose, dysphagia  CV:  No pain, palpitations, hypo/hypertension  Resp:  No dyspnea, cough, tachypnea, wheezing  GI:  No pain, nausea, vomiting, diarrhea, constipation  :  No pain, bleeding, incontinence, nocturia  Muscle:  No pain, weakness         Social History/Family History  SOCHX:   tobacco,  -  alcohol    FMHX: FA/MO  - contributory       Discussed with:  PMD, Family    Physical Exam:    Vital Signs:  Vital Signs Last 24 Hrs  T(C): 36.7 (24 Mar 2019 18:20), Max: 36.7 (24 Mar 2019 18:20)  T(F): 98.1 (24 Mar 2019 18:20), Max: 98.1 (24 Mar 2019 18:20)  HR: 70 (24 Mar 2019 18:20) (69 - 72)  BP: 120/65 (24 Mar 2019 18:20) (120/65 - 140/70)  BP(mean): --  RR: 18 (24 Mar 2019 18:20) (11 - 20)  SpO2: 98% (24 Mar 2019 18:20) (98% - 100%)  Daily Height in cm: 180.34 (24 Mar 2019 12:16)    Daily I&O's Summary        HEENT:  NC/AT, patent nares w/ pink mucosa, OP clear w/o lesions, PERRL, EOMI, conjunctivae clear, no thyromegaly, nodules, adenopathy, no JVD  Chest:  Full & symmetric excursion, no increased effort, breath sounds clear  Cardiovascular:  Regular rhythm, S1, S2, no murmur/rub/S3/S4, n edema  Abdomen:  Soft, non-tender, non-distended, normoactive bowel sounds, no HSM      Laboratory:                          14.9   5.27  )-----------( 180      ( 24 Mar 2019 13:17 )             47.6     03-24    140  |  105  |  10  ----------------------------<  131<H>  4.0   |  28  |  0.98    Ca    8.5      24 Mar 2019 14:17  Mg     2.4     03-24    TPro  7.4  /  Alb  3.4  /  TBili  1.0  /  DBili  x   /  AST  18  /  ALT  17  /  AlkPhos  72  03-24    ABG - ( 24 Mar 2019 12:57 )  pH, Arterial: x     pH, Blood: 7.38  /  pCO2: 45    /  pO2: 141   / HCO3: 26    / Base Excess: 1.0   /  SaO2: 99                CARDIAC MARKERS ( 24 Mar 2019 14:17 )  <.015 ng/mL / x     / x     / x     / <1.0 ng/mL      CAPILLARY BLOOD GLUCOSE        LIVER FUNCTIONS - ( 24 Mar 2019 14:17 )  Alb: 3.4 g/dL / Pro: 7.4 gm/dL / ALK PHOS: 72 U/L / ALT: 17 U/L / AST: 18 U/L / GGT: x           PT/INR - ( 24 Mar 2019 14:17 )   PT: 22.2 sec;   INR: 1.94 ratio        Assessment:  Hip pain  Edema  Acute on chronic diastolic CHF in setting of chronic AFIB  AC continues  TTE still pending  1 dose LAsix  Norvasc held as a cause of peripheral edema  May increase Coreg as needed for SBP  SBP and HR stable
89y old  Male who presents with a chief complaint of congestive  heart  failure leg  pain  unsteady  gait (24 Mar 2019 18:17)      Review of Systems:    General:  No wt loss, fevers, chills, night sweats  Eyes:  Good vision, no reported pain  ENT:  No sore throat, pain, runny nose, dysphagia  CV:  No pain, palpitations, hypo/hypertension  Resp:  No dyspnea, cough, tachypnea, wheezing  GI:  No pain, nausea, vomiting, diarrhea, constipation  :  No pain, bleeding, incontinence, nocturia  Muscle:  No pain, weakness         Social History/Family History  SOCHX:   tobacco,  -  alcohol    FMHX: FA/MO  - contributory       Discussed with:  PMD, Family    Physical Exam:    Vital Signs:  Vital Signs Last 24 Hrs  T(C): 36.7 (24 Mar 2019 18:20), Max: 36.7 (24 Mar 2019 18:20)  T(F): 98.1 (24 Mar 2019 18:20), Max: 98.1 (24 Mar 2019 18:20)  HR: 70 (24 Mar 2019 18:20) (69 - 72)  BP: 120/65 (24 Mar 2019 18:20) (120/65 - 140/70)  BP(mean): --  RR: 18 (24 Mar 2019 18:20) (11 - 20)  SpO2: 98% (24 Mar 2019 18:20) (98% - 100%)  Daily Height in cm: 180.34 (24 Mar 2019 12:16)    Daily I&O's Summary        HEENT:  NC/AT, patent nares w/ pink mucosa, OP clear w/o lesions, PERRL, EOMI, conjunctivae clear, no thyromegaly, nodules, adenopathy, no JVD  Chest:  Full & symmetric excursion, no increased effort, breath sounds clear  Cardiovascular:  Regular rhythm, S1, S2, no murmur/rub/S3/S4, n edema  Abdomen:  Soft, non-tender, non-distended, normoactive bowel sounds, no HSM      Laboratory:                          14.9   5.27  )-----------( 180      ( 24 Mar 2019 13:17 )             47.6     03-24    140  |  105  |  10  ----------------------------<  131<H>  4.0   |  28  |  0.98    Ca    8.5      24 Mar 2019 14:17  Mg     2.4     03-24    TPro  7.4  /  Alb  3.4  /  TBili  1.0  /  DBili  x   /  AST  18  /  ALT  17  /  AlkPhos  72  03-24    ABG - ( 24 Mar 2019 12:57 )  pH, Arterial: x     pH, Blood: 7.38  /  pCO2: 45    /  pO2: 141   / HCO3: 26    / Base Excess: 1.0   /  SaO2: 99                CARDIAC MARKERS ( 24 Mar 2019 14:17 )  <.015 ng/mL / x     / x     / x     / <1.0 ng/mL      CAPILLARY BLOOD GLUCOSE        LIVER FUNCTIONS - ( 24 Mar 2019 14:17 )  Alb: 3.4 g/dL / Pro: 7.4 gm/dL / ALK PHOS: 72 U/L / ALT: 17 U/L / AST: 18 U/L / GGT: x           PT/INR - ( 24 Mar 2019 14:17 )   PT: 22.2 sec;   INR: 1.94 ratio        Assessment:  Hip pain  Edema  Acute on chronic diastolic CHF in setting of chronic AFIB  No AC  TTE normal  1 dose LAsix  Norvasc held as a cause of peripheral edema  SBP and HR stable  DC plan
89y old  Male who presents with a chief complaint of congestive  heart  failure leg  pain  unsteady  gait (24 Mar 2019 18:17)      Review of Systems:    General:  No wt loss, fevers, chills, night sweats  Eyes:  Good vision, no reported pain  ENT:  No sore throat, pain, runny nose, dysphagia  CV:  No pain, palpitations, hypo/hypertension  Resp:  No dyspnea, cough, tachypnea, wheezing  GI:  No pain, nausea, vomiting, diarrhea, constipation  :  No pain, bleeding, incontinence, nocturia  Muscle:  No pain, weakness         Social History/Family History  SOCHX:   tobacco,  -  alcohol    FMHX: FA/MO  - contributory       Discussed with:  PMD, Family    Physical Exam:    Vital Signs:  Vital Signs Last 24 Hrs  T(C): 36.7 (24 Mar 2019 18:20), Max: 36.7 (24 Mar 2019 18:20)  T(F): 98.1 (24 Mar 2019 18:20), Max: 98.1 (24 Mar 2019 18:20)  HR: 70 (24 Mar 2019 18:20) (69 - 72)  BP: 120/65 (24 Mar 2019 18:20) (120/65 - 140/70)  BP(mean): --  RR: 18 (24 Mar 2019 18:20) (11 - 20)  SpO2: 98% (24 Mar 2019 18:20) (98% - 100%)  Daily Height in cm: 180.34 (24 Mar 2019 12:16)    Daily I&O's Summary        HEENT:  NC/AT, patent nares w/ pink mucosa, OP clear w/o lesions, PERRL, EOMI, conjunctivae clear, no thyromegaly, nodules, adenopathy, no JVD  Chest:  Full & symmetric excursion, no increased effort, breath sounds clear  Cardiovascular:  Regular rhythm, S1, S2, no murmur/rub/S3/S4, n edema  Abdomen:  Soft, non-tender, non-distended, normoactive bowel sounds, no HSM      Laboratory:                          14.9   5.27  )-----------( 180      ( 24 Mar 2019 13:17 )             47.6     03-24    140  |  105  |  10  ----------------------------<  131<H>  4.0   |  28  |  0.98    Ca    8.5      24 Mar 2019 14:17  Mg     2.4     03-24    TPro  7.4  /  Alb  3.4  /  TBili  1.0  /  DBili  x   /  AST  18  /  ALT  17  /  AlkPhos  72  03-24    ABG - ( 24 Mar 2019 12:57 )  pH, Arterial: x     pH, Blood: 7.38  /  pCO2: 45    /  pO2: 141   / HCO3: 26    / Base Excess: 1.0   /  SaO2: 99                CARDIAC MARKERS ( 24 Mar 2019 14:17 )  <.015 ng/mL / x     / x     / x     / <1.0 ng/mL      CAPILLARY BLOOD GLUCOSE        LIVER FUNCTIONS - ( 24 Mar 2019 14:17 )  Alb: 3.4 g/dL / Pro: 7.4 gm/dL / ALK PHOS: 72 U/L / ALT: 17 U/L / AST: 18 U/L / GGT: x           PT/INR - ( 24 Mar 2019 14:17 )   PT: 22.2 sec;   INR: 1.94 ratio        Assessment:  Hip pain  Edema  Acute on chronic diastolic CHF in setting of chronic AFIB  No AC  TTE normal  Norvasc held as a cause of peripheral edema  SBP and HR stable  DC plan
HPI:  patient  reports  L  leg  foot  pain  for  several weeks  worse  past  several  days  evaluated  in  [place]  found  to be in  CHF  admitted  for  further  w/u  and treatmenty (24 Mar 2019 18:17)  PAST MEDICAL & SURGICAL HISTORY:  Cholelithiasis  OA (osteoarthritis)  HLD (hyperlipidemia)  Dementia  Depression  Hypertension  DVT (deep venous thrombosis): bilateral, on coumadin  S/P IVC filter    Baseline ADLs (prior to admission):  Independent [ ] moderately [ ] fully   Dependent   [ ] moderately [ ]fully    	                 T(C): 36.7 (03-28-19 @ 17:41), Max: 36.7 (03-28-19 @ 17:41)  T(F): 98 (03-28-19 @ 17:41), Max: 98 (03-28-19 @ 17:41)  HR: 64 (03-28-19 @ 17:41) (64 - 102)  BP: 133/59 (03-28-19 @ 17:41) (106/74 - 149/88)  RR: 18 (03-28-19 @ 17:41) (18 - 19)  SpO2: 98% (03-28-19 @ 17:41) (92% - 99%)  Wt(kg): --      GENERAL:    EYES : non icteric :               Other:     HEENT:      	atraumatic, normocephalic, PEERLA, sclera anicteric, dry oral mucosa   NECK:          Trach:        Vent:  CVS:          Tachypnic:               Bradycardic:              Normal:		   RESP:        tachypnic:                Dyspenic :                  Comfortable:	  GI:          NGT/PEG 	  :            	  MUSC:       	Normal	Weakness	  Edema	   NEURO:     	No focal deficit	  Focal  PSYCH:           Alert	Oriented	  Lethargic     SKIN:         	Normal	  Other  LYMPH:      	Normal	  Other  	                     ALLERGIES: No Known Allergies    MEDICATIONS  (STANDING):  ALBUTerol/ipratropium for Nebulization 3 milliLiter(s) Nebulizer every 6 hours  carvedilol 3.125 milliGRAM(s) Oral every 12 hours  furosemide    Tablet 20 milliGRAM(s) Oral daily  heparin  Injectable 5000 Unit(s) SubCutaneous every 12 hours    MEDICATIONS  (PRN):  acetaminophen   Tablet .. 650 milliGRAM(s) Oral every 6 hours PRN Mild Pain (1 - 3)  melatonin 3 milliGRAM(s) Oral at bedtime PRN Insomnia  traMADol 50 milliGRAM(s) Oral four times a day PRN Severe Pain (7 - 10)  traMADol 50 milliGRAM(s) Oral two times a day PRN Moderate Pain    	                   LABS REVIEWED                    	  ADVANCED DIRECTIVES:   FULL CODE [x   ]  DNR [  ]    DNI [  ]     MOLST [  ]  PSYCHOSOCIAL-SPIRITUAL ASSESSMENT:       Reviewed       GOALS OF CARE DISCUSSION       Palliative care info/counseling provided	           Family meeting       Advanced Directives addressed	           See previous Palliative Medicine Note  	        REFERRALS	        Palliative Med        Unit SW/Case Mgmt              Speech/Swallow        Hospice             Nutrition         Functional Assessment: KPS:    <30           PPS: v poor      FINAL IMPRESSION AND RECOMMENDATIONS: aadv age , no adv [place], has poor quality of life and v poor prognosis , leaving for rehab, needs DNR/DNI   and DNH
INTERVAL HPI/OVERNIGHT EVENTS:        REVIEW OF SYSTEMS:  CONSTITUTIONAL:  c/o  pains  legs  slept  well    NECK: No pain or stiffnes  RESPIRATORY: No SOB   CARDIOVASCULAR: No chest pain, palpitations, dizziness,   GASTROINTESTINAL: No abdominal pain. No nausea, vomiting,   NEUROLOGICAL: No headaches, no  blurry  vision no  dizziness  SKIN: No itching,   MUSCULOSKELETAL: No pain    MEDICATION:  acetaminophen   Tablet .. 650 milliGRAM(s) Oral every 6 hours PRN  carvedilol 3.125 milliGRAM(s) Oral every 12 hours  heparin  Injectable 5000 Unit(s) SubCutaneous every 12 hours  traMADol 50 milliGRAM(s) Oral four times a day PRN  traMADol 50 milliGRAM(s) Oral two times a day PRN    Vital Signs Last 24 Hrs  T(C): 36.7 (25 Mar 2019 05:10), Max: 36.7 (24 Mar 2019 18:20)  T(F): 98 (25 Mar 2019 05:10), Max: 98.1 (24 Mar 2019 18:20)  HR: 65 (25 Mar 2019 05:10) (65 - 83)  BP: 122/76 (25 Mar 2019 05:10) (120/65 - 140/70)  BP(mean): --  RR: 17 (25 Mar 2019 05:10) (11 - 20)  SpO2: 97% (25 Mar 2019 05:10) (97% - 100%)    PHYSICAL EXAM:  GENERAL: NAD, well-groomed, well-developed  EYES:  conjunctiva and sclera clear  ENMT:  Moist mucous membranes,   NECK: Supple, No JVD, Normal thyroid  NERVOUS SYSTEM:  Alert oriented x2  no  focal  deficits;   CHEST/LUNG: Clear    HEART: Regular rate and rhythm; No murmurs, rubs, or gallops  ABDOMEN: Soft, Nontender, Nondistended; Bowel sounds present  EXTREMITIES:  1+  edema diffuse  tenderness    SKIN: No rashes   LABS:                        14.9   5.27  )-----------( 180      ( 24 Mar 2019 13:17 )             47.6     03-24    140  |  105  |  10  ----------------------------<  131<H>  4.0   |  28  |  0.98    Ca    8.5      24 Mar 2019 14:17  Mg     2.4     03-24    TPro  7.4  /  Alb  3.4  /  TBili  1.0  /  DBili  x   /  AST  18  /  ALT  17  /  AlkPhos  72  03-24    PT/INR - ( 24 Mar 2019 14:17 )   PT: 22.2 sec;   INR: 1.94 ratio           Urinalysis Basic - ( 25 Mar 2019 01:58 )    Color: Yellow / Appearance: Clear / S.015 / pH: x  Gluc: x / Ketone: Negative  / Bili: Negative / Urobili: Negative mg/dL   Blood: x / Protein: Negative mg/dL / Nitrite: Negative   Leuk Esterase: Negative / RBC: 25-50 /HPF / WBC x   Sq Epi: x / Non Sq Epi: x / Bacteria: Moderate      CAPILLARY BLOOD GLUCOSE          RADIOLOGY & ADDITIONAL TESTS:    Imaging reports  Personally Reviewed:  [x ] YES  [ ] NO    Consultant(s) Notes Reviewed:  [x ] YES  [ ] NO    Care Discussed with Consultants/Other Providers [x ] YES  [ ] NO  Problem/Plan - 1:  ·  Problem: Generalized edema.  Plan: lasix  tfts  tte coreg  mon itor  off  norvasc    Problem/Plan - 2:  ·  Problem: Hypertension.  Plan: coreg    Problem/Plan - 3:  ·  Problem: OA (osteoarthritis).  Plan: tylnol ultram  PT.
INTERVAL HPI/OVERNIGHT EVENTS:        REVIEW OF SYSTEMS:  CONSTITUTIONAL:  episodes  of  increased  confusion  wanting  to  get  up    MEDICATION:  acetaminophen   Tablet .. 650 milliGRAM(s) Oral every 6 hours PRN  ALBUTerol/ipratropium for Nebulization 3 milliLiter(s) Nebulizer every 6 hours  carvedilol 3.125 milliGRAM(s) Oral every 12 hours  furosemide    Tablet 20 milliGRAM(s) Oral daily  heparin  Injectable 5000 Unit(s) SubCutaneous every 12 hours  traMADol 50 milliGRAM(s) Oral four times a day PRN  traMADol 50 milliGRAM(s) Oral two times a day PRN    Vital Signs Last 24 Hrs  T(C): 36.2 (26 Mar 2019 16:47), Max: 36.3 (26 Mar 2019 06:26)  T(F): 97.2 (26 Mar 2019 16:47), Max: 97.4 (26 Mar 2019 06:26)  HR: 73 (26 Mar 2019 17:00) (65 - 89)  BP: 135/73 (26 Mar 2019 16:47) (107/81 - 135/73)  BP(mean): --  RR: 18 (26 Mar 2019 16:47) (17 - 18)  SpO2: 96% (26 Mar 2019 17:00) (94% - 98%)    PHYSICAL EXAM:  GENERAL: NAD, well-groomed, well-developed  EYES:  conjunctiva and sclera clear     NECK: Supple, No JVD, Normal thyroid  NERVOUS SYSTEM:  Alert confused  no  focal  deficits;   CHEST/LUNG: Clear    HEART: Regular rate and rhythm; No murmurs, rubs, or gallops  ABDOMEN: Soft, Nontender, Nondistended; Bowel sounds present  EXTREMITIES:  1+  edema  SKIN: No rashes   LABS:                        15.3   6.87  )-----------( 172      ( 26 Mar 2019 07:23 )             48.9     03-26    142  |  107  |  13  ----------------------------<  87  3.9   |  27  |  1.06    Ca    8.9      26 Mar 2019 07:23    TPro  8.2  /  Alb  3.7  /  TBili  1.5<H>  /  DBili  x   /  AST  23  /  ALT  20  /  AlkPhos  82  03-26      Urinalysis Basic - ( 25 Mar 2019 01:58 )    Color: Yellow / Appearance: Clear / S.015 / pH: x  Gluc: x / Ketone: Negative  / Bili: Negative / Urobili: Negative mg/dL   Blood: x / Protein: Negative mg/dL / Nitrite: Negative   Leuk Esterase: Negative / RBC: 25-50 /HPF / WBC x   Sq Epi: x / Non Sq Epi: x / Bacteria: Moderate      CAPILLARY BLOOD GLUCOSE          RADIOLOGY & ADDITIONAL TESTS:    Imaging reports  Personally Reviewed:  [ x] YES  [ ] NO    Consultant(s) Notes Reviewed:  [x ] YES  [ ] NO    Care Discussed with Consultants/Other Providers [x ] YES  [ ] NO  Problem/Plan - 1:  ·  Problem: Generalized edema.  Plan: lasix  tfts  tte coreg  mon itor  off  norvasc    Problem/Plan - 2:  ·  Problem: Hypertension.  Plan: coreg    Problem/Plan - 3:  ·  Problem: OA (osteoarthritis).  Plan: tylenol ultram  PT.   dementia   acute  delirium redirect  observation  neuro  eval
INTERVAL HPI/OVERNIGHT EVENTS:        REVIEW OF SYSTEMS:  CONSTITUTIONAL:  no  complaints    NECK: No pain or stiffnes  RESPIRATORY: No SOB   CARDIOVASCULAR: No chest pain, palpitations, dizziness,   GASTROINTESTINAL: No abdominal pain. No nausea, vomiting,   NEUROLOGICAL: No headaches, no  blurry  vision no  dizziness  SKIN: No itching,   MUSCULOSKELETAL: No pain    MEDICATION:  acetaminophen   Tablet .. 650 milliGRAM(s) Oral every 6 hours PRN  ALBUTerol/ipratropium for Nebulization 3 milliLiter(s) Nebulizer every 6 hours  carvedilol 3.125 milliGRAM(s) Oral every 12 hours  furosemide    Tablet 20 milliGRAM(s) Oral daily  heparin  Injectable 5000 Unit(s) SubCutaneous every 12 hours  melatonin 3 milliGRAM(s) Oral at bedtime PRN  traMADol 50 milliGRAM(s) Oral four times a day PRN  traMADol 50 milliGRAM(s) Oral two times a day PRN    Vital Signs Last 24 Hrs  T(C): 36.2 (29 Mar 2019 05:39), Max: 36.8 (29 Mar 2019 00:38)  T(F): 97.1 (29 Mar 2019 05:39), Max: 98.3 (29 Mar 2019 00:38)  HR: 78 (29 Mar 2019 05:51) (64 - 102)  BP: 147/87 (29 Mar 2019 05:39) (123/64 - 147/87)  BP(mean): --  RR: 18 (29 Mar 2019 05:39) (18 - 20)  SpO2: 94% (29 Mar 2019 05:51) (92% - 99%)    PHYSICAL EXAM:  GENERAL: NAD, well-groomed, well-developed  EYES:  conjunctiva and sclera clear  ENMT:  Moist mucous membranes,   NECK: Supple, No JVD, Normal thyroid  NERVOUS SYSTEM:  Alert confused   no  focal  deficits;   CHEST/LUNG: Clear    HEART: Regular rate and rhythm; No murmurs, rubs, or gallops  ABDOMEN: Soft, Nontender, Nondistended; Bowel sounds present  EXTREMITIES: +  edema no  tenderness  SKIN: No rashes   LABS:              CAPILLARY BLOOD GLUCOSE          RADIOLOGY & ADDITIONAL TESTS:    Imaging reports  Personally Reviewed:  [ x] YES  [ ] NO    Consultant(s) Notes Reviewed:  [ x] YES  [ ] NO    Care Discussed with Consultants/Other Providers [x ] YES  [ ] NO  Problem/Plan - 1:  ·  Problem: Generalized edema.  Plan: lasix  tfts  tte coreg  monitor  off  norvasc    Problem/Plan - 2:  ·  Problem: Hypertension.  Plan: coreg    Problem/Plan - 3:  ·  Problem: OA (osteoarthritis).  Plan: tylenol ultram  PT.   dementia   acute  delirium    redirect  observation  appears  improved  discharge  to  rehab
INTERVAL HPI/OVERNIGHT EVENTS:    continues  to  have  episodes  of  agitation  aggressive  behaviour requirring  enhanced observation  REVIEW OF SYSTEMS:  CONSTITUTIONAL:  presently  calm   no  complaints      MEDICATION:  acetaminophen   Tablet .. 650 milliGRAM(s) Oral every 6 hours PRN  ALBUTerol/ipratropium for Nebulization 3 milliLiter(s) Nebulizer every 6 hours  carvedilol 3.125 milliGRAM(s) Oral every 12 hours  furosemide    Tablet 20 milliGRAM(s) Oral daily  heparin  Injectable 5000 Unit(s) SubCutaneous every 12 hours  traMADol 50 milliGRAM(s) Oral four times a day PRN  traMADol 50 milliGRAM(s) Oral two times a day PRN    Vital Signs Last 24 Hrs  T(C): 37 (27 Mar 2019 05:49), Max: 37 (27 Mar 2019 05:49)  T(F): 98.6 (27 Mar 2019 05:49), Max: 98.6 (27 Mar 2019 05:49)  HR: 74 (27 Mar 2019 06:05) (67 - 89)  BP: 129/77 (27 Mar 2019 05:49) (107/81 - 135/73)  BP(mean): --  RR: 20 (27 Mar 2019 05:49) (18 - 20)  SpO2: 94% (27 Mar 2019 06:05) (94% - 98%)    PHYSICAL EXAM:  GENERAL: NAD, well-groomed, well-developed  EYES:  conjunctiva and sclera clear  ENMT:  Moist mucous membranes,   NECK: Supple, No JVD, Normal thyroid  NERVOUS SYSTEM:  Alert oriented x2  no  focal  deficits;   CHEST/LUNG: Clear    HEART: Regular rate and rhythm; No murmurs, rubs, or gallops  ABDOMEN: Soft, Nontender, Nondistended; Bowel sounds present  EXTREMITIES:  no  edema no  tenderness  SKIN: No rashes   LABS:                        15.3   6.87  )-----------( 172      ( 26 Mar 2019 07:23 )             48.9     03-26    142  |  107  |  13  ----------------------------<  87  3.9   |  27  |  1.06    Ca    8.9      26 Mar 2019 07:23    TPro  8.2  /  Alb  3.7  /  TBili  1.5<H>  /  DBili  x   /  AST  23  /  ALT  20  /  AlkPhos  82  03-26        CAPILLARY BLOOD GLUCOSE          RADIOLOGY & ADDITIONAL TESTS:    Imaging reports  Personally Reviewed:  [ x] YES  [ ] NO    Consultant(s) Notes Reviewed:  [ x] YES  [ ] NO    Care Discussed with Consultants/Other Providers [x ] YES  [ ] NO  Problem/Plan - 1:  ·  Problem: Generalized edema.  Plan: lasix  tfts  tte coreg  mon itor  off  norvasc    Problem/Plan - 2:  ·  Problem: Hypertension.  Plan: coreg    Problem/Plan - 3:  ·  Problem: OA (osteoarthritis).  Plan: tylenol ultram  PT.   dementia   acute  delirium    redirect  observation  neuro  eval
INTERVAL HPI/OVERNIGHT EVENTS:    had agitaiton  confusion  aggressive  behaviour trying  to  leave  the room required  haldol  3/27/19    MEDICATION:      REVIEW OF SYSTEMS:  CONSTITUTIONAL:  feels  better  more  cooperative    MEDICATION:  acetaminophen   Tablet .. 650 milliGRAM(s) Oral every 6 hours PRN  ALBUTerol/ipratropium for Nebulization 3 milliLiter(s) Nebulizer every 6 hours  carvedilol 3.125 milliGRAM(s) Oral every 12 hours  furosemide    Tablet 20 milliGRAM(s) Oral daily  heparin  Injectable 5000 Unit(s) SubCutaneous every 12 hours  melatonin 3 milliGRAM(s) Oral at bedtime PRN  traMADol 50 milliGRAM(s) Oral four times a day PRN  traMADol 50 milliGRAM(s) Oral two times a day PRN    Vital Signs Last 24 Hrs  T(C): 36.3 (28 Mar 2019 05:07), Max: 36.4 (27 Mar 2019 12:59)  T(F): 97.4 (28 Mar 2019 05:07), Max: 97.6 (27 Mar 2019 12:59)  HR: 88 (28 Mar 2019 07:15) (70 - 109)  BP: 149/88 (28 Mar 2019 05:07) (106/74 - 149/88)  BP(mean): --  RR: 18 (28 Mar 2019 05:07) (18 - 21)  SpO2: 98% (28 Mar 2019 07:15) (95% - 98%)    PHYSICAL EXAM:  GENERAL: NAD, well-groomed, well-developed  EYES:  conjunctiva and sclera clear  ENMT:  Moist mucous membranes,   NECK: Supple, No JVD, Normal thyroid no  bruits  NERVOUS SYSTEM:  Alert oriented   no  focal  deficits;   CHEST/LUNG: Clear    HEART: Regular rate and rhythm; No murmurs, rubs, or gallops  ABDOMEN: Soft, Nontender, Nondistended; Bowel sounds present  EXTREMITIES:  + edema no  tenderness  SKIN: No rashes   LABS:                        15.3   6.87  )-----------( 172      ( 26 Mar 2019 07:23 )             48.9     03-26    142  |  107  |  13  ----------------------------<  87  3.9   |  27  |  1.06    Ca    8.9      26 Mar 2019 07:23    TPro  8.2  /  Alb  3.7  /  TBili  1.5<H>  /  DBili  x   /  AST  23  /  ALT  20  /  AlkPhos  82  03-26        CAPILLARY BLOOD GLUCOSE          RADIOLOGY & ADDITIONAL TESTS:    Imaging reports  Personally Reviewed:  [x ] YES  [ ] NO    Consultant(s) Notes Reviewed:  [x ] YES  [ ] NO    Care Discussed with Consultants/Other Providers [x ] YES  [ ] NO  Problem/Plan - 1:  ·  Problem: Generalized edema.  Plan: lasix  tfts  tte coreg  monitor  off  norvasc    Problem/Plan - 2:  ·  Problem: Hypertension.  Plan: coreg    Problem/Plan - 3:  ·  Problem: OA (osteoarthritis).  Plan: tylenol ultram  PT.   dementia   acute  delirium    redirect  observation  appears  improved  discharge  to  rehab
Patient is a 89y old  Male who presents with a chief complaint of congestive  heart  failure leg  pain  unsteady  gait (27 Mar 2019 07:33)    HPI: patient  reports  L  leg  foot  pain  for  several weeks  worse  past  several  days  evaluated  in  [place]  found  to be in  CHF  admitted  for  further  w/u  and treatmenty (24 Mar 2019 18:17)    Has had delrium, episodes of confusion, aggressive behavior - Code barragan called - close observation required.    Currently sitting in chair, feeding himself dinner.  Awake, alert, confused but calm and cooperative. No complaints..    REVIEW OF SYSTEMS as above but poor historian    PAST MEDICAL & SURGICAL HISTORY  Cholelithiasis  OA (osteoarthritis)  HLD (hyperlipidemia)  Dementia  Depression  Hypertension  DVT (deep venous thrombosis)  S/P IVC filter    SOCHX: Lives with son in house - 7 steps to enter, 2 flights up to his bedroom, was able to negotiate by himself. Ind ambulating  Denies tobacco, alcohol    FMHX: FA/MO  noncontributory     ALLERGIES  No Known Allergies      MEDICATIONS reviewed    Wt(kg): -- 92 kg    PHYSICAL EXAM  BMI - 28.3  Constitutional - NAD, Comfortable in chair  Cardiovascular - RRR  Abdomen - Soft, Not tender  Extremities - Functional range of motion X4, no edema. LE skin frail.  Neurologic -                    Cognitive - Awake, Alert, Oriented X2 - does not know year, incorrect age, thinks he is a hospital     Speech Intact     Language  Limited but grossly intact     Motor - No focal deficits     Psychiatric - Mood flat      RECENT LABS reviewed    IMAGING reviewed:  CXR - clear  CT Head - chronic atrophy - no acute findings  xray R hip - no fracture  BLE Dopplers - no DVT    FUNCTIONAL HISTORY Ind amb, stairs to 3rd floor bedroom.  HHA several hours/day.    CURRENT FUNCTIONAL STATUS Amb 25 feet with walker and assistance of therapist    IMPRESSION: 89 M with Hx dementia but who was fairly independent ambulator/stairs, admitted with acute on chronic heart failure, LE pain of unclear etiology but may be related to failure. Aggressive behavior has resolved with treatment. Functional ability has declined significantly.    PLAN: agree with discharge to rehabilitation facility..
89y old  Male who presents with a chief complaint of congestive  heart  failure leg  pain  unsteady  gait (24 Mar 2019 18:17)      Review of Systems:    General:  No wt loss, fevers, chills, night sweats  Eyes:  Good vision, no reported pain  ENT:  No sore throat, pain, runny nose, dysphagia  CV:  No pain, palpitations, hypo/hypertension  Resp:  No dyspnea, cough, tachypnea, wheezing  GI:  No pain, nausea, vomiting, diarrhea, constipation  :  No pain, bleeding, incontinence, nocturia  Muscle:  No pain, weakness         Social History/Family History  SOCHX:   tobacco,  -  alcohol    FMHX: FA/MO  - contributory       Discussed with:  PMD, Family    Physical Exam:    Vital Signs:  Vital Signs Last 24 Hrs  T(C): 36.7 (24 Mar 2019 18:20), Max: 36.7 (24 Mar 2019 18:20)  T(F): 98.1 (24 Mar 2019 18:20), Max: 98.1 (24 Mar 2019 18:20)  HR: 70 (24 Mar 2019 18:20) (69 - 72)  BP: 120/65 (24 Mar 2019 18:20) (120/65 - 140/70)  BP(mean): --  RR: 18 (24 Mar 2019 18:20) (11 - 20)  SpO2: 98% (24 Mar 2019 18:20) (98% - 100%)  Daily Height in cm: 180.34 (24 Mar 2019 12:16)    Daily I&O's Summary        HEENT:  NC/AT, patent nares w/ pink mucosa, OP clear w/o lesions, PERRL, EOMI, conjunctivae clear, no thyromegaly, nodules, adenopathy, no JVD  Chest:  Full & symmetric excursion, no increased effort, breath sounds clear  Cardiovascular:  Regular rhythm, S1, S2, no murmur/rub/S3/S4, n edema  Abdomen:  Soft, non-tender, non-distended, normoactive bowel sounds, no HSM      Laboratory:                          14.9   5.27  )-----------( 180      ( 24 Mar 2019 13:17 )             47.6     03-24    140  |  105  |  10  ----------------------------<  131<H>  4.0   |  28  |  0.98    Ca    8.5      24 Mar 2019 14:17  Mg     2.4     03-24    TPro  7.4  /  Alb  3.4  /  TBili  1.0  /  DBili  x   /  AST  18  /  ALT  17  /  AlkPhos  72  03-24    ABG - ( 24 Mar 2019 12:57 )  pH, Arterial: x     pH, Blood: 7.38  /  pCO2: 45    /  pO2: 141   / HCO3: 26    / Base Excess: 1.0   /  SaO2: 99                CARDIAC MARKERS ( 24 Mar 2019 14:17 )  <.015 ng/mL / x     / x     / x     / <1.0 ng/mL      CAPILLARY BLOOD GLUCOSE        LIVER FUNCTIONS - ( 24 Mar 2019 14:17 )  Alb: 3.4 g/dL / Pro: 7.4 gm/dL / ALK PHOS: 72 U/L / ALT: 17 U/L / AST: 18 U/L / GGT: x           PT/INR - ( 24 Mar 2019 14:17 )   PT: 22.2 sec;   INR: 1.94 ratio        Assessment:  Hip pain  Edema  Acute on chronic diastolic CHF in setting of chronic AFIB  AC continues  TTE pending  1 dose LAsix  Norvasc held as a cause of peripheral edema  May increase Coreg as needed for SBP  SBP and HR stable

## 2019-03-29 NOTE — DISCHARGE NOTE NURSING/CASE MANAGEMENT/SOCIAL WORK - NSDCDPATPORTLINK_GEN_ALL_CORE
You can access the [facility], offered by [facility], by registering with the following website: [email or web address]

## 2019-04-03 DIAGNOSIS — F32.9 MAJOR DEPRESSIVE DISORDER, SINGLE EPISODE, UNSPECIFIED: ICD-10-CM

## 2019-04-03 DIAGNOSIS — Z86.718 PERSONAL HISTORY OF OTHER VENOUS THROMBOSIS AND EMBOLISM: ICD-10-CM

## 2019-04-03 DIAGNOSIS — R06.02 SHORTNESS OF BREATH: ICD-10-CM

## 2019-04-03 DIAGNOSIS — E78.5 HYPERLIPIDEMIA, UNSPECIFIED: ICD-10-CM

## 2019-04-03 DIAGNOSIS — F03.91 UNSPECIFIED DEMENTIA WITH BEHAVIORAL DISTURBANCE: ICD-10-CM

## 2019-04-03 DIAGNOSIS — I11.0 HYPERTENSIVE HEART DISEASE WITH HEART FAILURE: ICD-10-CM

## 2019-04-03 DIAGNOSIS — I48.2 CHRONIC ATRIAL FIBRILLATION: ICD-10-CM

## 2019-04-03 DIAGNOSIS — R26.81 UNSTEADINESS ON FEET: ICD-10-CM

## 2019-04-03 DIAGNOSIS — M25.559 PAIN IN UNSPECIFIED HIP: ICD-10-CM

## 2019-04-03 DIAGNOSIS — T46.1X5A ADVERSE EFFECT OF CALCIUM-CHANNEL BLOCKERS, INITIAL ENCOUNTER: ICD-10-CM

## 2019-04-03 DIAGNOSIS — M19.90 UNSPECIFIED OSTEOARTHRITIS, UNSPECIFIED SITE: ICD-10-CM

## 2019-04-03 DIAGNOSIS — I50.33 ACUTE ON CHRONIC DIASTOLIC (CONGESTIVE) HEART FAILURE: ICD-10-CM

## 2019-04-03 DIAGNOSIS — Z79.01 LONG TERM (CURRENT) USE OF ANTICOAGULANTS: ICD-10-CM

## 2019-04-03 DIAGNOSIS — R60.1 GENERALIZED EDEMA: ICD-10-CM

## 2019-04-03 DIAGNOSIS — F05 DELIRIUM DUE TO KNOWN PHYSIOLOGICAL CONDITION: ICD-10-CM

## 2019-09-29 ENCOUNTER — INPATIENT (INPATIENT)
Facility: HOSPITAL | Age: 84
LOS: 4 days | Discharge: NURSING, REHAB OR LONG-TERM CARE | End: 2019-10-04
Attending: INTERNAL MEDICINE | Admitting: INTERNAL MEDICINE
Payer: MEDICARE

## 2019-09-29 VITALS
HEART RATE: 74 BPM | DIASTOLIC BLOOD PRESSURE: 71 MMHG | OXYGEN SATURATION: 98 % | WEIGHT: 199.96 LBS | SYSTOLIC BLOOD PRESSURE: 132 MMHG | TEMPERATURE: 98 F | RESPIRATION RATE: 18 BRPM

## 2019-09-29 DIAGNOSIS — I10 ESSENTIAL (PRIMARY) HYPERTENSION: ICD-10-CM

## 2019-09-29 DIAGNOSIS — I82.519 CHRONIC EMBOLISM AND THROMBOSIS OF UNSPECIFIED FEMORAL VEIN: ICD-10-CM

## 2019-09-29 DIAGNOSIS — Z98.89 OTHER SPECIFIED POSTPROCEDURAL STATES: Chronic | ICD-10-CM

## 2019-09-29 DIAGNOSIS — F03.90 UNSPECIFIED DEMENTIA WITHOUT BEHAVIORAL DISTURBANCE: ICD-10-CM

## 2019-09-29 DIAGNOSIS — M19.90 UNSPECIFIED OSTEOARTHRITIS, UNSPECIFIED SITE: ICD-10-CM

## 2019-09-29 DIAGNOSIS — K81.0 ACUTE CHOLECYSTITIS: ICD-10-CM

## 2019-09-29 LAB
ALBUMIN SERPL ELPH-MCNC: 3.8 G/DL — SIGNIFICANT CHANGE UP (ref 3.3–5)
ALP SERPL-CCNC: 134 U/L — HIGH (ref 40–120)
ALT FLD-CCNC: 47 U/L — SIGNIFICANT CHANGE UP (ref 12–78)
ANION GAP SERPL CALC-SCNC: 8 MMOL/L — SIGNIFICANT CHANGE UP (ref 5–17)
APTT BLD: 36.2 SEC — SIGNIFICANT CHANGE UP (ref 27.5–36.3)
AST SERPL-CCNC: 116 U/L — HIGH (ref 15–37)
BASOPHILS # BLD AUTO: 0 K/UL — SIGNIFICANT CHANGE UP (ref 0–0.2)
BASOPHILS NFR BLD AUTO: 0 % — SIGNIFICANT CHANGE UP (ref 0–2)
BILIRUB SERPL-MCNC: 2.9 MG/DL — HIGH (ref 0.2–1.2)
BUN SERPL-MCNC: 11 MG/DL — SIGNIFICANT CHANGE UP (ref 7–23)
CALCIUM SERPL-MCNC: 8.7 MG/DL — SIGNIFICANT CHANGE UP (ref 8.5–10.1)
CHLORIDE SERPL-SCNC: 104 MMOL/L — SIGNIFICANT CHANGE UP (ref 96–108)
CO2 SERPL-SCNC: 27 MMOL/L — SIGNIFICANT CHANGE UP (ref 22–31)
CREAT SERPL-MCNC: 1.07 MG/DL — SIGNIFICANT CHANGE UP (ref 0.5–1.3)
EOSINOPHIL # BLD AUTO: 0 K/UL — SIGNIFICANT CHANGE UP (ref 0–0.5)
EOSINOPHIL NFR BLD AUTO: 0 % — SIGNIFICANT CHANGE UP (ref 0–6)
GLUCOSE SERPL-MCNC: 130 MG/DL — HIGH (ref 70–99)
HCT VFR BLD CALC: 46.1 % — SIGNIFICANT CHANGE UP (ref 39–50)
HGB BLD-MCNC: 14.2 G/DL — SIGNIFICANT CHANGE UP (ref 13–17)
INR BLD: 2 RATIO — HIGH (ref 0.88–1.16)
LIDOCAIN IGE QN: 98 U/L — SIGNIFICANT CHANGE UP (ref 73–393)
LYMPHOCYTES # BLD AUTO: 0.37 K/UL — LOW (ref 1–3.3)
LYMPHOCYTES # BLD AUTO: 5 % — LOW (ref 13–44)
MANUAL SMEAR VERIFICATION: SIGNIFICANT CHANGE UP
MCHC RBC-ENTMCNC: 26.5 PG — LOW (ref 27–34)
MCHC RBC-ENTMCNC: 30.8 GM/DL — LOW (ref 32–36)
MCV RBC AUTO: 86.2 FL — SIGNIFICANT CHANGE UP (ref 80–100)
MONOCYTES # BLD AUTO: 0.37 K/UL — SIGNIFICANT CHANGE UP (ref 0–0.9)
MONOCYTES NFR BLD AUTO: 5 % — SIGNIFICANT CHANGE UP (ref 2–14)
NEUTROPHILS # BLD AUTO: 6.65 K/UL — SIGNIFICANT CHANGE UP (ref 1.8–7.4)
NEUTROPHILS NFR BLD AUTO: 78 % — HIGH (ref 43–77)
NEUTS BAND # BLD: 12 % — HIGH (ref 0–8)
NRBC # BLD: 0 /100 — SIGNIFICANT CHANGE UP (ref 0–0)
NRBC # BLD: SIGNIFICANT CHANGE UP /100 WBCS (ref 0–0)
NT-PROBNP SERPL-SCNC: 115 PG/ML — SIGNIFICANT CHANGE UP (ref 0–450)
PLAT MORPH BLD: NORMAL — SIGNIFICANT CHANGE UP
PLATELET # BLD AUTO: 194 K/UL — SIGNIFICANT CHANGE UP (ref 150–400)
POTASSIUM SERPL-MCNC: 4 MMOL/L — SIGNIFICANT CHANGE UP (ref 3.5–5.3)
POTASSIUM SERPL-SCNC: 4 MMOL/L — SIGNIFICANT CHANGE UP (ref 3.5–5.3)
PROT SERPL-MCNC: 8.1 GM/DL — SIGNIFICANT CHANGE UP (ref 6–8.3)
PROTHROM AB SERPL-ACNC: 22.9 SEC — HIGH (ref 10–12.9)
RBC # BLD: 5.35 M/UL — SIGNIFICANT CHANGE UP (ref 4.2–5.8)
RBC # FLD: 17.7 % — HIGH (ref 10.3–14.5)
RBC BLD AUTO: SIGNIFICANT CHANGE UP
SODIUM SERPL-SCNC: 139 MMOL/L — SIGNIFICANT CHANGE UP (ref 135–145)
TROPONIN I SERPL-MCNC: <.015 NG/ML — SIGNIFICANT CHANGE UP (ref 0.01–0.04)
WBC # BLD: 7.39 K/UL — SIGNIFICANT CHANGE UP (ref 3.8–10.5)
WBC # FLD AUTO: 7.39 K/UL — SIGNIFICANT CHANGE UP (ref 3.8–10.5)

## 2019-09-29 PROCEDURE — 74177 CT ABD & PELVIS W/CONTRAST: CPT | Mod: 26

## 2019-09-29 PROCEDURE — 93010 ELECTROCARDIOGRAM REPORT: CPT

## 2019-09-29 PROCEDURE — 76700 US EXAM ABDOM COMPLETE: CPT | Mod: 26

## 2019-09-29 PROCEDURE — 71045 X-RAY EXAM CHEST 1 VIEW: CPT | Mod: 26

## 2019-09-29 PROCEDURE — 99222 1ST HOSP IP/OBS MODERATE 55: CPT

## 2019-09-29 PROCEDURE — 99285 EMERGENCY DEPT VISIT HI MDM: CPT

## 2019-09-29 PROCEDURE — 99221 1ST HOSP IP/OBS SF/LOW 40: CPT

## 2019-09-29 RX ORDER — SODIUM CHLORIDE 9 MG/ML
1000 INJECTION INTRAMUSCULAR; INTRAVENOUS; SUBCUTANEOUS ONCE
Refills: 0 | Status: COMPLETED | OUTPATIENT
Start: 2019-09-29 | End: 2019-09-29

## 2019-09-29 RX ORDER — IOHEXOL 300 MG/ML
30 INJECTION, SOLUTION INTRAVENOUS ONCE
Refills: 0 | Status: DISCONTINUED | OUTPATIENT
Start: 2019-09-29 | End: 2019-09-29

## 2019-09-29 RX ORDER — PIPERACILLIN AND TAZOBACTAM 4; .5 G/20ML; G/20ML
3.38 INJECTION, POWDER, LYOPHILIZED, FOR SOLUTION INTRAVENOUS EVERY 8 HOURS
Refills: 0 | Status: COMPLETED | OUTPATIENT
Start: 2019-09-30 | End: 2019-10-03

## 2019-09-29 RX ORDER — CARVEDILOL PHOSPHATE 80 MG/1
3.12 CAPSULE, EXTENDED RELEASE ORAL EVERY 12 HOURS
Refills: 0 | Status: DISCONTINUED | OUTPATIENT
Start: 2019-09-29 | End: 2019-10-02

## 2019-09-29 RX ORDER — PIPERACILLIN AND TAZOBACTAM 4; .5 G/20ML; G/20ML
3.38 INJECTION, POWDER, LYOPHILIZED, FOR SOLUTION INTRAVENOUS ONCE
Refills: 0 | Status: COMPLETED | OUTPATIENT
Start: 2019-09-29 | End: 2019-09-29

## 2019-09-29 RX ORDER — SODIUM CHLORIDE 9 MG/ML
1000 INJECTION, SOLUTION INTRAVENOUS
Refills: 0 | Status: DISCONTINUED | OUTPATIENT
Start: 2019-09-29 | End: 2019-10-01

## 2019-09-29 RX ORDER — TRAMADOL HYDROCHLORIDE 50 MG/1
50 TABLET ORAL EVERY 8 HOURS
Refills: 0 | Status: DISCONTINUED | OUTPATIENT
Start: 2019-09-29 | End: 2019-10-04

## 2019-09-29 RX ADMIN — PIPERACILLIN AND TAZOBACTAM 3.38 GRAM(S): 4; .5 INJECTION, POWDER, LYOPHILIZED, FOR SOLUTION INTRAVENOUS at 23:18

## 2019-09-29 RX ADMIN — PIPERACILLIN AND TAZOBACTAM 200 GRAM(S): 4; .5 INJECTION, POWDER, LYOPHILIZED, FOR SOLUTION INTRAVENOUS at 22:15

## 2019-09-29 RX ADMIN — SODIUM CHLORIDE 1000 MILLILITER(S): 9 INJECTION INTRAMUSCULAR; INTRAVENOUS; SUBCUTANEOUS at 23:18

## 2019-09-29 RX ADMIN — SODIUM CHLORIDE 1000 MILLILITER(S): 9 INJECTION INTRAMUSCULAR; INTRAVENOUS; SUBCUTANEOUS at 22:15

## 2019-09-29 NOTE — H&P ADULT - ASSESSMENT
89M presents with abdominal pain - found to have acute cholecystitis on ultrasound.  patient evaled by surgical team who deemed him not a candidate for cholecystectomy.  Suggest cholecystomy by IR. Will admit patient to floor, keep NPO and consult IR in AM.

## 2019-09-29 NOTE — H&P ADULT - NSHPPHYSICALEXAM_GEN_ALL_CORE
Physical exam:  General: patient in no acute distress, resting comfortably  Cardio: S1/S2 +ve, regular rate and rhythm,   Resp: clear to ausculation bilaterally, no rales or wheezes  GI: abdomen moderately distended, sharp pain upon palpation of RUQ/RLQ, guarding +ve BS +ve  Ext: no significant pedal edema

## 2019-09-29 NOTE — ED PROVIDER NOTE - PROGRESS NOTE DETAILS
acute kinga based on sono and labs. band count noted. start abx. case dw dr aquino - not surgical candidate. admit to medicine, for ir perc kinga.

## 2019-09-29 NOTE — CONSULT NOTE ADULT - SUBJECTIVE AND OBJECTIVE BOX
GENERAL SURGERY CONSULT NOTE    Patient is a 89y old  Male who presents with a chief complaint of chest pain.    HPI: 89M with PMH dementia, HTN, HLD, DVT B/L (on coumadin) presents to the ER c/o chest pain according to the ER documentation. Patient seen and examined at bedside, poor historian, confused 2/2 dementia. Patient c/o neck pain and abdominal pain. States abdominal pain started 3 days ago, he points around his umbilicus when asked where the pain is. States it is "severe" but looks comfortable on stretcher. Denies N/V, Denies fever/chills.   Denies change in BM ,diarrhea or constipation     PAST MEDICAL & SURGICAL HISTORY:  Cholelithiasis  OA (osteoarthritis)  HLD (hyperlipidemia)  Dementia  Depression  Hypertension  DVT (deep venous thrombosis): bilateral, on coumadin  S/P IVC filter      Review of Systems:  Unable to obtain, patient is a poor historian and confused     MEDICATIONS  (STANDING):    Allergies    No Known Allergies    SOCIAL HISTORY   Unable to obtain.    FAMILY HISTORY:  No pertinent family history      Vital Signs Last 24 Hrs  T(C): 37.3 (29 Sep 2019 19:38), Max: 37.3 (29 Sep 2019 19:38)  T(F): 99.1 (29 Sep 2019 19:38), Max: 99.1 (29 Sep 2019 19:38)  HR: 84 (29 Sep 2019 21:32) (74 - 90)  BP: 106/55 (29 Sep 2019 21:32) (106/55 - 132/71)  RR: 18 (29 Sep 2019 21:32) (18 - 22)  SpO2: 96% (29 Sep 2019 21:32) (96% - 98%)    Physical Exam:    General: Appears younger than age, WD/WN, NAD  Eyes : SAIGE  HENT:  WNL, no JVD  Chest:  clear breath sounds  Cardiovascular: S1S2, RRR  Abdomen: Nondistended, +BS, soft, Mild epigastric tenderness, no guarding, no rebound  Extremities: 3+ Pitting edema to LE b/l, no calf tenderness.   Skin:  No rash  Musculoskeletal:  normal strength  Neuro/Psych:  Alert, and awake, oriented x1.       LABS:                        14.2   7.39  )-----------( 194      ( 29 Sep 2019 18:14 )             46.1     09-29    139  |  104  |  11  ----------------------------<  130<H>  4.0   |  27  |  1.07    Ca    8.7      29 Sep 2019 18:14    TPro  8.1  /  Alb  3.8  /  TBili  2.9<H>  /  DBili  x   /  AST  116<H>  /  ALT  47  /  AlkPhos  134<H>  09-29    PT/INR - ( 29 Sep 2019 18:14 )   PT: 22.9 sec;   INR: 2.00 ratio         PTT - ( 29 Sep 2019 18:14 )  PTT:36.2 sec    RADIOLOGY & ADDITIONAL STUDIES:  < from: CT Abdomen and Pelvis w/ Oral Cont and w/ IV Cont (09.29.19 @ 21:18) >    IMPRESSION:     Cholelithiasis and mild gallbladder wall thickening; correlate clinically   and further evaluation with ultrasound and/or nuclear medicine HIDA scan.    Mild sigmoid wall thickening versus underdistention; correlate   clinically, consider colonoscopic correlation as indicated.    < end of copied text >    < from: US Abdomen Complete (09.29.19 @ 20:31) >    IMPRESSION:     Gallstones with wall thickening and sonographic Soriano's sign. These   findings are consistent with cholecystitis.  Right renal cyst.    < end of copied text >

## 2019-09-29 NOTE — ED PROVIDER NOTE - CLINICAL SUMMARY MEDICAL DECISION MAKING FREE TEXT BOX
Ddx: ro ACS, HEART score 3/ no sob to suggest pna or PE  Plan: Cbc, cmp, bnp, cxr, ecg, troponin, reassess

## 2019-09-29 NOTE — CONSULT NOTE ADULT - ASSESSMENT
A/P: 89M with multiple medical comorbidities and dementia admitted with Cholelithiasis, acute cholecystitis     -Recommend Medical admission and IR evaluation for Perc kinga as patient is not a good surgical candidate due to age and multiple medical comorbidities.   -Zosyn  -Recommend HIDA scan to r/o acute kinga  -NPO/ IVF  -f/u am labs  -Discussed with Dr. Rueda

## 2019-09-29 NOTE — ED ADULT NURSE NOTE - OBJECTIVE STATEMENT
patient BIBA , as per EMS patient here for abdominal pain , chest pain and SOB , patient oriented to name only  Patient A/O X 1.  Patient poor historian.  [facility]

## 2019-09-29 NOTE — ED PROVIDER NOTE - OBJECTIVE STATEMENT
Pt is a 89 yo gentleman with a pmhx of dementia, HTn, HL, DVT on coumadin who presents to the ED with chest discomfort. Indicates pain in chest region. Is not able to describe pain in further detail. Looks comfortable in bed. No cough, no fevers, no dysuria.

## 2019-09-29 NOTE — ED ADULT NURSE REASSESSMENT NOTE - NS ED NURSE REASSESS COMMENT FT1
Pt complains of pain to bilateral legs unable to rate or describe pain. Pt noted with discoloration to bilateral legs. Swelling noted to bilateral feet, ankle and legs. Pt grimacing and stating it hurts while checking for pitting edema.

## 2019-09-29 NOTE — ED ADULT NURSE NOTE - PMH
Cholelithiasis    Dementia    Depression    DVT (deep venous thrombosis)  bilateral, on coumadin  HLD (hyperlipidemia)    Hypertension    OA (osteoarthritis)

## 2019-09-29 NOTE — ED ADULT TRIAGE NOTE - CHIEF COMPLAINT QUOTE
patient BIBA , as per EMS patient here for abdominal pain , chest pain and SOB , patient oriented to name only

## 2019-09-29 NOTE — H&P ADULT - NSHPLABSRESULTS_GEN_ALL_CORE
14.2   7.39  )-----------( 194      ( 29 Sep 2019 18:14 )             46.1     09-29    139  |  104  |  11  ----------------------------<  130<H>  4.0   |  27  |  1.07    Ca    8.7      29 Sep 2019 18:14    TPro  8.1  /  Alb  3.8  /  TBili  2.9<H>  /  DBili  x   /  AST  116<H>  /  ALT  47  /  AlkPhos  134<H>  09-29    PT/INR - ( 29 Sep 2019 18:14 )   PT: 22.9 sec;   INR: 2.00 ratio         PTT - ( 29 Sep 2019 18:14 )  PTT:36.2 sec  LIVER FUNCTIONS - ( 29 Sep 2019 18:14 )  Alb: 3.8 g/dL / Pro: 8.1 gm/dL / ALK PHOS: 134 U/L / ALT: 47 U/L / AST: 116 U/L / GGT: x               Home Medications:  carvedilol 3.125 mg oral tablet: 1 tab(s) orally every 12 hours (24 Mar 2019 13:02)  furosemide 20 mg oral tablet: 1 tab(s) orally once a day (28 Mar 2019 07:34)  heparin: 5000 unit(s) subcutaneous 2 times a day (28 Mar 2019 07:34)  melatonin 3 mg oral tablet: 1 tab(s) orally once a day (at bedtime), As needed, Insomnia (28 Mar 2019 07:34)  traMADol 50 mg oral tablet: 1 tab(s) orally 2 times a day, As needed, Moderate Pain (24 Mar 2019 13:02)

## 2019-09-30 LAB
ALBUMIN SERPL ELPH-MCNC: 3.1 G/DL — LOW (ref 3.3–5)
ALP SERPL-CCNC: 128 U/L — HIGH (ref 40–120)
ALT FLD-CCNC: 160 U/L — HIGH (ref 12–78)
ANION GAP SERPL CALC-SCNC: 8 MMOL/L — SIGNIFICANT CHANGE UP (ref 5–17)
APPEARANCE UR: CLEAR — SIGNIFICANT CHANGE UP
APTT BLD: 34.4 SEC — SIGNIFICANT CHANGE UP (ref 28.5–37)
AST SERPL-CCNC: 269 U/L — HIGH (ref 15–37)
BACTERIA # UR AUTO: NEGATIVE — SIGNIFICANT CHANGE UP
BILIRUB SERPL-MCNC: 4.2 MG/DL — HIGH (ref 0.2–1.2)
BILIRUB UR-MCNC: NEGATIVE — SIGNIFICANT CHANGE UP
BLD GP AB SCN SERPL QL: SIGNIFICANT CHANGE UP
BUN SERPL-MCNC: 14 MG/DL — SIGNIFICANT CHANGE UP (ref 7–23)
CALCIUM SERPL-MCNC: 8.4 MG/DL — LOW (ref 8.5–10.1)
CHLORIDE SERPL-SCNC: 107 MMOL/L — SIGNIFICANT CHANGE UP (ref 96–108)
CO2 SERPL-SCNC: 26 MMOL/L — SIGNIFICANT CHANGE UP (ref 22–31)
COLOR SPEC: YELLOW — SIGNIFICANT CHANGE UP
CREAT SERPL-MCNC: 1.02 MG/DL — SIGNIFICANT CHANGE UP (ref 0.5–1.3)
DIFF PNL FLD: ABNORMAL
EPI CELLS # UR: SIGNIFICANT CHANGE UP
GLUCOSE SERPL-MCNC: 110 MG/DL — HIGH (ref 70–99)
GLUCOSE UR QL: NEGATIVE MG/DL — SIGNIFICANT CHANGE UP
GRAM STN FLD: SIGNIFICANT CHANGE UP
HCT VFR BLD CALC: 40.6 % — SIGNIFICANT CHANGE UP (ref 39–50)
HGB BLD-MCNC: 12.7 G/DL — LOW (ref 13–17)
INR BLD: 2.04 RATIO — HIGH (ref 0.88–1.16)
KETONES UR-MCNC: NEGATIVE — SIGNIFICANT CHANGE UP
LEUKOCYTE ESTERASE UR-ACNC: NEGATIVE — SIGNIFICANT CHANGE UP
MCHC RBC-ENTMCNC: 26.8 PG — LOW (ref 27–34)
MCHC RBC-ENTMCNC: 31.3 GM/DL — LOW (ref 32–36)
MCV RBC AUTO: 85.7 FL — SIGNIFICANT CHANGE UP (ref 80–100)
NITRITE UR-MCNC: NEGATIVE — SIGNIFICANT CHANGE UP
NRBC # BLD: 0 /100 WBCS — SIGNIFICANT CHANGE UP (ref 0–0)
PH UR: 9 — HIGH (ref 5–8)
PLATELET # BLD AUTO: 171 K/UL — SIGNIFICANT CHANGE UP (ref 150–400)
POTASSIUM SERPL-MCNC: 4.2 MMOL/L — SIGNIFICANT CHANGE UP (ref 3.5–5.3)
POTASSIUM SERPL-SCNC: 4.2 MMOL/L — SIGNIFICANT CHANGE UP (ref 3.5–5.3)
PROT SERPL-MCNC: 6.8 GM/DL — SIGNIFICANT CHANGE UP (ref 6–8.3)
PROT UR-MCNC: 15 MG/DL
PROTHROM AB SERPL-ACNC: 23.4 SEC — HIGH (ref 10–12.9)
RBC # BLD: 4.74 M/UL — SIGNIFICANT CHANGE UP (ref 4.2–5.8)
RBC # FLD: 17.6 % — HIGH (ref 10.3–14.5)
RBC CASTS # UR COMP ASSIST: SIGNIFICANT CHANGE UP /HPF (ref 0–4)
SODIUM SERPL-SCNC: 141 MMOL/L — SIGNIFICANT CHANGE UP (ref 135–145)
SP GR SPEC: 1.01 — SIGNIFICANT CHANGE UP (ref 1.01–1.02)
SPECIMEN SOURCE: SIGNIFICANT CHANGE UP
UROBILINOGEN FLD QL: 4 MG/DL
WBC # BLD: 11.54 K/UL — HIGH (ref 3.8–10.5)
WBC # FLD AUTO: 11.54 K/UL — HIGH (ref 3.8–10.5)
WBC UR QL: SIGNIFICANT CHANGE UP

## 2019-09-30 PROCEDURE — 99233 SBSQ HOSP IP/OBS HIGH 50: CPT

## 2019-09-30 PROCEDURE — 99231 SBSQ HOSP IP/OBS SF/LOW 25: CPT

## 2019-09-30 PROCEDURE — 47490 INCISION OF GALLBLADDER: CPT

## 2019-09-30 RX ORDER — MEMANTINE HYDROCHLORIDE 10 MG/1
5 TABLET ORAL
Refills: 0 | Status: DISCONTINUED | OUTPATIENT
Start: 2019-09-30 | End: 2019-10-04

## 2019-09-30 RX ORDER — INFLUENZA VIRUS VACCINE 15; 15; 15; 15 UG/.5ML; UG/.5ML; UG/.5ML; UG/.5ML
0.5 SUSPENSION INTRAMUSCULAR ONCE
Refills: 0 | Status: COMPLETED | OUTPATIENT
Start: 2019-09-30 | End: 2019-10-04

## 2019-09-30 RX ORDER — ACETAMINOPHEN 500 MG
650 TABLET ORAL ONCE
Refills: 0 | Status: COMPLETED | OUTPATIENT
Start: 2019-09-30 | End: 2019-09-30

## 2019-09-30 RX ORDER — ESCITALOPRAM OXALATE 10 MG/1
10 TABLET, FILM COATED ORAL DAILY
Refills: 0 | Status: DISCONTINUED | OUTPATIENT
Start: 2019-09-30 | End: 2019-10-04

## 2019-09-30 RX ADMIN — TRAMADOL HYDROCHLORIDE 50 MILLIGRAM(S): 50 TABLET ORAL at 18:27

## 2019-09-30 RX ADMIN — CARVEDILOL PHOSPHATE 3.12 MILLIGRAM(S): 80 CAPSULE, EXTENDED RELEASE ORAL at 17:32

## 2019-09-30 RX ADMIN — SODIUM CHLORIDE 60 MILLILITER(S): 9 INJECTION, SOLUTION INTRAVENOUS at 00:36

## 2019-09-30 RX ADMIN — SODIUM CHLORIDE 60 MILLILITER(S): 9 INJECTION, SOLUTION INTRAVENOUS at 23:49

## 2019-09-30 RX ADMIN — TRAMADOL HYDROCHLORIDE 50 MILLIGRAM(S): 50 TABLET ORAL at 16:40

## 2019-09-30 RX ADMIN — PIPERACILLIN AND TAZOBACTAM 25 GRAM(S): 4; .5 INJECTION, POWDER, LYOPHILIZED, FOR SOLUTION INTRAVENOUS at 23:48

## 2019-09-30 RX ADMIN — PIPERACILLIN AND TAZOBACTAM 25 GRAM(S): 4; .5 INJECTION, POWDER, LYOPHILIZED, FOR SOLUTION INTRAVENOUS at 14:18

## 2019-09-30 RX ADMIN — Medication 650 MILLIGRAM(S): at 16:36

## 2019-09-30 RX ADMIN — Medication 650 MILLIGRAM(S): at 16:52

## 2019-09-30 RX ADMIN — PIPERACILLIN AND TAZOBACTAM 25 GRAM(S): 4; .5 INJECTION, POWDER, LYOPHILIZED, FOR SOLUTION INTRAVENOUS at 06:22

## 2019-09-30 NOTE — PROGRESS NOTE ADULT - SUBJECTIVE AND OBJECTIVE BOX
Patient is a 89y old  Male who presents with a chief complaint of abdominal pain (29 Sep 2019 23:42)      OVERNIGHT EVENTS: abdominal pain    REVIEW OF SYSTEMS: denies chest pain/SOB, diaphoresis, no F/C, cough, dizziness, headache, blurry vision, nausea, vomiting. All others review of systems negative     MEDICATIONS  (STANDING):  carvedilol 3.125 milliGRAM(s) Oral every 12 hours  dextrose 5% + sodium chloride 0.9%. 1000 milliLiter(s) (60 mL/Hr) IV Continuous <Continuous>  influenza   Vaccine 0.5 milliLiter(s) IntraMuscular once  piperacillin/tazobactam IVPB.. 3.375 Gram(s) IV Intermittent every 8 hours    MEDICATIONS  (PRN):  traMADol 50 milliGRAM(s) Oral every 8 hours PRN Moderate Pain (4 - 6)      Allergies    No Known Allergies    Intolerances        T(F): 99.1 (19 @ 05:06), Max: 100 (19 @ 23:31)  HR: 70 (19 @ 05:06) (70 - 90)  BP: 126/61 (19 @ 05:06) (106/55 - 132/71)  RR: 18 (19 @ 05:06) (16 - 22)  SpO2: 98% (19 @ 05:06) (95% - 98%)  Wt(kg): --    PHYSICAL EXAM:  GENERAL: NAD, well-groomed, well-developed  HEAD:  Atraumatic, Normocephalic  EYES: EOMI, PERRLA, conjunctiva and sclera clear  ENMT:  Moist mucous membranes  NECK: Supple, No JVD, Normal thyroid  NERVOUS SYSTEM:  Alert & awake  CHEST/LUNG: Clear to percussion bilaterally; No rales, rhonchi, wheezing, or rubs BL  HEART: Regular rate and rhythm; No murmurs, rubs, or gallops  ABDOMEN: abdomen moderately distended, sharp pain upon palpation of RUQ/RLQ, guarding +ve BS +ve  EXTREMITIES:  2+ Peripheral Pulses, No clubbing, cyanosis, or edema BL LE  SKIN: moist    LABS:                        12.7   11.54 )-----------( 171      ( 30 Sep 2019 08:57 )             40.6     09-30    141  |  107  |  14  ----------------------------<  110<H>  4.2   |  26  |  1.02    Ca    8.4<L>      30 Sep 2019 08:57    TPro  6.8  /  Alb  3.1<L>  /  TBili  4.2<H>  /  DBili  x   /  AST  269<H>  /  ALT  160<H>  /  AlkPhos  128<H>  0930    PT/INR - ( 30 Sep 2019 08:57 )   PT: 23.4 sec;   INR: 2.04 ratio         PTT - ( 30 Sep 2019 08:57 )  PTT:34.4 sec  Urinalysis Basic - ( 30 Sep 2019 01:33 )    Color: Yellow / Appearance: Clear / S.015 / pH: x  Gluc: x / Ketone: Negative  / Bili: Negative / Urobili: 4 mg/dL   Blood: x / Protein: 15 mg/dL / Nitrite: Negative   Leuk Esterase: Negative / RBC: 0-2 /HPF / WBC 0-2   Sq Epi: x / Non Sq Epi: Occasional / Bacteria: Negative      Cultures;   CAPILLARY BLOOD GLUCOSE        Lipid panel:     CARDIAC MARKERS ( 29 Sep 2019 18:14 )  <.015 ng/mL / x     / x     / x     / x            RADIOLOGY & ADDITIONAL TESTS:    Imaging Personally Reviewed:  [x ] YES    < from: CT Abdomen and Pelvis w/ Oral Cont and w/ IV Cont (19 @ 21:18) >  Cholelithiasis and mild gallbladder wall thickening; correlate clinically   and further evaluation with ultrasound and/or nuclear medicine HIDA scan.    Mild sigmoid wall thickening versus underdistention; correlate   clinically, consider colonoscopic correlation as indicated.    < end of copied text >      Consultant(s) Notes Reviewed:  [x ] YES     Care Discussed with [x ] Consultants [X ] Patient [x ] Family; dtr  [x ]    [x ]  Other; RN

## 2019-09-30 NOTE — PROGRESS NOTE ADULT - ASSESSMENT
89M presents with abdominal pain - found to have acute cholecystitis on ultrasound.  patient evaled by surgical team who deemed him not a candidate for cholecystectomy.  Suggest cholecystomy by IR.

## 2019-09-30 NOTE — CONSULT NOTE ADULT - SUBJECTIVE AND OBJECTIVE BOX
Patient is a 89y old  Male who presents with a chief complaint of abdominal pain (30 Sep 2019 11:25)    IR consulted for percutaneous cholecystostomy tube placement.  Pt now with leukocytosis, low grade fever (other vitals stable), elevating LFT's.  Pt still c/o right side abdominal pain and is TTP.  INR is 2.04.  Pt is on home coumadin for afib and LE DVTs    CT Abdomen and Pelvis w/ Oral Cont and w/ IV Cont (19 @ 21:18) >  Cholelithiasis and mild gallbladder wall thickening; correlate clinically   and further evaluation with ultrasound and/or nuclear medicine HIDA scan.  Mild sigmoid wall thickening versus underdistention; correlate   clinically, consider colonoscopic correlation as indicated.    US Abdomen Complete (19 @ 20:31) >  Gallstones with wall thickening and sonographic Soriano's sign. These   findings are consistent with cholecystitis.  Right renal cyst.      HPI:  Patient is a 89M with a PMH of dementia, DVT s/p IVC filter, HTN, HLD, and OA who presents to ED for abdominal pain from today.  Patient is AAOx2 (person and place) but can answer simple questions.  Patient complains of pain in the R sided of his abdomen.  US shows signs for acute cholecystitis, patient evaled by surgical team who deemed him not a candidate for cholecystectomy.  Suggest cholecystomy by IR. (29 Sep 2019 23:42)      General:  No wt loss, fevers, chills, night sweats  Eyes:  Good vision, no reported pain  CV:  No pain, palpitations,   Resp:  No dyspnea, cough, tachypnea, wheezing  GI:  No pain, nausea, vomiting, diarrhea, constipation  :  No pain, bleeding, incontinence, nocturia  Muscle:  No pain, weakness  Neuro:  No weakness, tingling, memory problems  Psych:  No fatigue, insomnia, mood problems, depression  Endocrine:  No polyuria, polydypsia, cold/heat intolerance  Heme:  No petechiae, ecchymosis, easy bruisability  Skin:  No rash, tattoos, scars, edema    PAST MEDICAL & SURGICAL HISTORY:  Cholelithiasis  OA (osteoarthritis)  HLD (hyperlipidemia)  Dementia  Depression  Hypertension  DVT (deep venous thrombosis): bilateral, on coumadin  S/P IVC filter      Allergies    No Known Allergies    Intolerances        MEDICATIONS  (STANDING):  carvedilol 3.125 milliGRAM(s) Oral every 12 hours  dextrose 5% + sodium chloride 0.9%. 1000 milliLiter(s) (60 mL/Hr) IV Continuous <Continuous>  influenza   Vaccine 0.5 milliLiter(s) IntraMuscular once  piperacillin/tazobactam IVPB.. 3.375 Gram(s) IV Intermittent every 8 hours    MEDICATIONS  (PRN):  traMADol 50 milliGRAM(s) Oral every 8 hours PRN Moderate Pain (4 - 6)        SOCIAL HISTORY:    FAMILY HISTORY:  No pertinent family history        PHYSICAL EXAM:    Vital Signs Last 24 Hrs  T(C): 36.3 (30 Sep 2019 11:59), Max: 37.8 (29 Sep 2019 23:31)  T(F): 97.3 (30 Sep 2019 11:59), Max: 100 (29 Sep 2019 23:31)  HR: 64 (30 Sep 2019 11:59) (64 - 90)  BP: 123/59 (30 Sep 2019 11:59) (106/55 - 132/71)  BP(mean): --  RR: 18 (30 Sep 2019 11:59) (16 - 22)  SpO2: 97% (30 Sep 2019 11:59) (95% - 98%)    General:  Appears stated age, well-groomed, well-nourished, no distress  Lungs:  CTAB  Cardiovascular:  good S1, S2,   Abdomen: as above  Extremities:  no calf tenderness/swelling b/l  Neuro/Psych:  A &O x 2    LABS:                        12.7   11.54 )-----------( 171      ( 30 Sep 2019 08:57 )             40.6         141  |  107  |  14  ----------------------------<  110<H>  4.2   |  26  |  1.02    Ca    8.4<L>      30 Sep 2019 08:57    TPro  6.8  /  Alb  3.1<L>  /  TBili  4.2<H>  /  DBili  x   /  AST  269<H>  /  ALT  160<H>  /  AlkPhos  128<H>      PT/INR - ( 30 Sep 2019 08:57 )   PT: 23.4 sec;   INR: 2.04 ratio         PTT - ( 30 Sep 2019 08:57 )  PTT:34.4 sec  Urinalysis Basic - ( 30 Sep 2019 01:33 )    Color: Yellow / Appearance: Clear / S.015 / pH: x  Gluc: x / Ketone: Negative  / Bili: Negative / Urobili: 4 mg/dL   Blood: x / Protein: 15 mg/dL / Nitrite: Negative   Leuk Esterase: Negative / RBC: 0-2 /HPF / WBC 0-2   Sq Epi: x / Non Sq Epi: Occasional / Bacteria: Negative        RADIOLOGY & ADDITIONAL STUDIES:  as above

## 2019-09-30 NOTE — CONSULT NOTE ADULT - PROBLEM SELECTOR RECOMMENDATION 9
-Procedure to be performed today  -will give 1 unit of FFP for elevated INR, meds, vitals and labs reviewed  -consent obtained from daughter, Saray gaytan

## 2019-09-30 NOTE — PROGRESS NOTE ADULT - SUBJECTIVE AND OBJECTIVE BOX
Patient s/p US/Fluoro percutaneous cholecystostomy tube placement    Operators: YAMILA Carrillo MD    Findings:  An 8.5fr MPC placed percutaneously into the gallbladder and and correct placement confirmed with fluoroscopy.  Approx 30mLs of malodorous dark  Complications: None.    Blood loss:        ASSESSMENT:      PLAN:        IR Attending:  I agree with above PA note.  Note edited where necessary.      No Bebeto DUNN  Interventional Radiology Patient s/p US/Fluoro percutaneous cholecystostomy tube placement    Operators: YAMILA Carrillo MD    Findings:  An 8.5fr MPC placed percutaneously into the gallbladder and and correct placement confirmed with fluoroscopy.  Approx 30mLs of malodorous dark greenish red fluid aspirated and sent to lab for analysis.  Hemostasis achieved.  Catheter sutured down and DSD applied.  Pt tolerated procedure well.  VSS    Complications: None.    Blood loss:  minimal    Anesthesiology present      ASSESSMENT:      PLAN: Patient s/p US/Fluoro percutaneous cholecystostomy tube placement    Operators: YAMILA Carrillo MD    Findings:  An 8.5fr MPC placed percutaneously into the gallbladder and and correct placement confirmed with fluoroscopy.  Approx 30mLs of malodorous dark greenish red fluid aspirated and sent to lab for analysis.  Hemostasis achieved.  Catheter sutured down and DSD applied.  Pt tolerated procedure well.  VSS    Complications: None.    Blood loss:  minimal    Anesthesiology present      ASSESSMENT:  89 yo male s/p US/Fluoro percutaneous cholecystostomy tube placement for acute cholecystitis 2/2 gallstones    PLAN:  -f/u bile gs/cx and fungal results  -nursing to flush catheter daily  -continue abx  -monitor drainage output  -HOME coumadin can be resumed tomorrow IF prescribed  -IR will f/u in 3 months for kinga tube exchange

## 2019-09-30 NOTE — CONSULT NOTE ADULT - ASSESSMENT
89 yo male with abdominal pain, found to have acute cholecystitis 2/2 gallstones  INR is supratherapeutic  IR consulted for percutaneous cholecystostomy

## 2019-09-30 NOTE — PROGRESS NOTE ADULT - SUBJECTIVE AND OBJECTIVE BOX
Patient seen and examined bedside with daughter present, resting comfortably without new complaints.  Denies abdominal pain, nausea, vomiting.    Patient is poor historian, daughter interjected as needed.   Denies chest pain, dyspnea, cough.    T(F): 97.3 (09-30-19 @ 11:59), Max: 100 (09-29-19 @ 23:31)  HR: 64 (09-30-19 @ 11:59) (64 - 90)  BP: 123/59 (09-30-19 @ 11:59) (106/55 - 132/71)  RR: 18 (09-30-19 @ 11:59) (16 - 22)  SpO2: 97% (09-30-19 @ 11:59) (95% - 98%)    PHYSICAL EXAM:  General: NAD, WDWN  Neuro: Alert awake and responsive  CV: +S1S2  Lung: respirations nonlabored  Abdomen: soft with Mild distention, +BS, nontender, without rebound or guarding.   Extremities: no pedal edema or calf tenderness noted     LABS:                        12.7   11.54 )-----------( 171      ( 30 Sep 2019 08:57 )             40.6     09-30    141  |  107  |  14  ----------------------------<  110<H>  4.2   |  26  |  1.02    Ca    8.4<L>      30 Sep 2019 08:57    TPro  6.8  /  Alb  3.1<L>  /  TBili  4.2<H>  /  DBili  x   /  AST  269<H>  /  ALT  160<H>  /  AlkPhos  128<H>  09-30    PT/INR - ( 30 Sep 2019 08:57 )   PT: 23.4 sec;   INR: 2.04 ratio         PTT - ( 30 Sep 2019 08:57 )  PTT:34.4 sec    RADIOLOGY & ADDITIONAL STUDIES:   < from: CT Abdomen and Pelvis w/ Oral Cont and w/ IV Cont (09.29.19 @ 21:18) >  Cholelithiasis and mild gallbladder wall thickening; correlate clinically   and further evaluation with ultrasound and/or nuclear medicine HIDA scan.    Mild sigmoid wall thickening versus underdistention; correlate   clinically, consider colonoscopic correlation as indicated.    < end of copied text >      Impression: 89yo Male with acute cholecystitis with cholelithiasis  Plan:  - c/w abx and IVF  - NPO, for IR perc kinga today   - Continue medical management and supportive care

## 2019-10-01 DIAGNOSIS — I48.20 CHRONIC ATRIAL FIBRILLATION, UNSPECIFIED: ICD-10-CM

## 2019-10-01 LAB
ANION GAP SERPL CALC-SCNC: 8 MMOL/L — SIGNIFICANT CHANGE UP (ref 5–17)
BUN SERPL-MCNC: 15 MG/DL — SIGNIFICANT CHANGE UP (ref 7–23)
CALCIUM SERPL-MCNC: 8.2 MG/DL — LOW (ref 8.5–10.1)
CHLORIDE SERPL-SCNC: 105 MMOL/L — SIGNIFICANT CHANGE UP (ref 96–108)
CO2 SERPL-SCNC: 27 MMOL/L — SIGNIFICANT CHANGE UP (ref 22–31)
CREAT SERPL-MCNC: 0.99 MG/DL — SIGNIFICANT CHANGE UP (ref 0.5–1.3)
CULTURE RESULTS: NO GROWTH — SIGNIFICANT CHANGE UP
GLUCOSE SERPL-MCNC: 100 MG/DL — HIGH (ref 70–99)
HCT VFR BLD CALC: 42.8 % — SIGNIFICANT CHANGE UP (ref 39–50)
HGB BLD-MCNC: 13.5 G/DL — SIGNIFICANT CHANGE UP (ref 13–17)
INR BLD: 1.48 RATIO — HIGH (ref 0.88–1.16)
MCHC RBC-ENTMCNC: 27.1 PG — SIGNIFICANT CHANGE UP (ref 27–34)
MCHC RBC-ENTMCNC: 31.5 GM/DL — LOW (ref 32–36)
MCV RBC AUTO: 85.8 FL — SIGNIFICANT CHANGE UP (ref 80–100)
NRBC # BLD: 0 /100 WBCS — SIGNIFICANT CHANGE UP (ref 0–0)
PLATELET # BLD AUTO: 165 K/UL — SIGNIFICANT CHANGE UP (ref 150–400)
POTASSIUM SERPL-MCNC: 3.5 MMOL/L — SIGNIFICANT CHANGE UP (ref 3.5–5.3)
POTASSIUM SERPL-SCNC: 3.5 MMOL/L — SIGNIFICANT CHANGE UP (ref 3.5–5.3)
PROTHROM AB SERPL-ACNC: 16.8 SEC — HIGH (ref 10–12.9)
RBC # BLD: 4.99 M/UL — SIGNIFICANT CHANGE UP (ref 4.2–5.8)
RBC # FLD: 17.8 % — HIGH (ref 10.3–14.5)
SODIUM SERPL-SCNC: 140 MMOL/L — SIGNIFICANT CHANGE UP (ref 135–145)
SPECIMEN SOURCE: SIGNIFICANT CHANGE UP
WBC # BLD: 7.12 K/UL — SIGNIFICANT CHANGE UP (ref 3.8–10.5)
WBC # FLD AUTO: 7.12 K/UL — SIGNIFICANT CHANGE UP (ref 3.8–10.5)

## 2019-10-01 PROCEDURE — 99233 SBSQ HOSP IP/OBS HIGH 50: CPT

## 2019-10-01 PROCEDURE — 99232 SBSQ HOSP IP/OBS MODERATE 35: CPT | Mod: 24

## 2019-10-01 RX ORDER — WARFARIN SODIUM 2.5 MG/1
2 TABLET ORAL ONCE
Refills: 0 | Status: COMPLETED | OUTPATIENT
Start: 2019-10-01 | End: 2019-10-01

## 2019-10-01 RX ADMIN — TRAMADOL HYDROCHLORIDE 50 MILLIGRAM(S): 50 TABLET ORAL at 21:17

## 2019-10-01 RX ADMIN — PIPERACILLIN AND TAZOBACTAM 25 GRAM(S): 4; .5 INJECTION, POWDER, LYOPHILIZED, FOR SOLUTION INTRAVENOUS at 22:15

## 2019-10-01 RX ADMIN — PIPERACILLIN AND TAZOBACTAM 25 GRAM(S): 4; .5 INJECTION, POWDER, LYOPHILIZED, FOR SOLUTION INTRAVENOUS at 14:08

## 2019-10-01 RX ADMIN — TRAMADOL HYDROCHLORIDE 50 MILLIGRAM(S): 50 TABLET ORAL at 11:18

## 2019-10-01 RX ADMIN — ESCITALOPRAM OXALATE 10 MILLIGRAM(S): 10 TABLET, FILM COATED ORAL at 11:15

## 2019-10-01 RX ADMIN — WARFARIN SODIUM 2 MILLIGRAM(S): 2.5 TABLET ORAL at 21:26

## 2019-10-01 RX ADMIN — PIPERACILLIN AND TAZOBACTAM 25 GRAM(S): 4; .5 INJECTION, POWDER, LYOPHILIZED, FOR SOLUTION INTRAVENOUS at 06:24

## 2019-10-01 RX ADMIN — TRAMADOL HYDROCHLORIDE 50 MILLIGRAM(S): 50 TABLET ORAL at 12:00

## 2019-10-01 RX ADMIN — MEMANTINE HYDROCHLORIDE 5 MILLIGRAM(S): 10 TABLET ORAL at 17:14

## 2019-10-01 RX ADMIN — TRAMADOL HYDROCHLORIDE 50 MILLIGRAM(S): 50 TABLET ORAL at 20:17

## 2019-10-01 RX ADMIN — CARVEDILOL PHOSPHATE 3.12 MILLIGRAM(S): 80 CAPSULE, EXTENDED RELEASE ORAL at 17:14

## 2019-10-01 NOTE — PHYSICAL THERAPY INITIAL EVALUATION ADULT - BALANCE TRAINING, PT EVAL
Improve sitting and standing/ambulation to a safe level , prevent falls with use of appropriate assistive device.

## 2019-10-01 NOTE — PROGRESS NOTE ADULT - SUBJECTIVE AND OBJECTIVE BOX
Patient s/p percutaneous intercostal transhepatic cholecystostomy tube placement.  Pt c/o RUQ pain (Ultram on board),  Insertion site is CDI.  Abdomen is soft, ND.  Output - 175mLs of bilious fluid.  Gram stain - mod gram + rods, cx/fungal pending.  Pt on zosyn.  No leukocytosis today, afebrile , other vitals stable.          PHYSICAL EXAM:    Vital Signs Last 24 Hrs  T(C): 36.1 (01 Oct 2019 04:50), Max: 37.1 (30 Sep 2019 23:13)  T(F): 97 (01 Oct 2019 04:50), Max: 98.8 (30 Sep 2019 23:13)  HR: 92 (01 Oct 2019 04:50) (60 - 92)  BP: 119/57 (01 Oct 2019 04:50) (119/57 - 132/62)  BP(mean): --  RR: 17 (01 Oct 2019 04:50) (16 - 18)  SpO2: 94% (01 Oct 2019 04:50) (94% - 97%)    General:  A & O x3, NAD  Lungs:  CTAB  Cardiovascular:  good S1, S2,   Abdomen:  as above, catheter in situ  Extremities:  no calf swelling/tenderness b/l        LABS:                        13.5   7.12  )-----------( 165      ( 01 Oct 2019 08:27 )             42.8     10    140  |  105  |  15  ----------------------------<  100<H>  3.5   |  27  |  0.99    Ca    8.2<L>      01 Oct 2019 08:27    TPro  6.8  /  Alb  3.1<L>  /  TBili  4.2<H>  /  DBili  x   /  AST  269<H>  /  ALT  160<H>  /  AlkPhos  128<H>  09-30    PT/INR - ( 01 Oct 2019 08:27 )   PT: 16.8 sec;   INR: 1.48 ratio         PTT - ( 30 Sep 2019 08:57 )  PTT:34.4 sec  Urinalysis Basic - ( 30 Sep 2019 01:33 )    Color: Yellow / Appearance: Clear / S.015 / pH: x  Gluc: x / Ketone: Negative  / Bili: Negative / Urobili: 4 mg/dL   Blood: x / Protein: 15 mg/dL / Nitrite: Negative   Leuk Esterase: Negative / RBC: 0-2 /HPF / WBC 0-2   Sq Epi: x / Non Sq Epi: Occasional / Bacteria: Negative        RADIOLOGY & ADDITIONAL STUDIES:

## 2019-10-01 NOTE — PROGRESS NOTE ADULT - PROBLEM SELECTOR PROBLEM 2
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

## 2019-10-01 NOTE — PROGRESS NOTE ADULT - SUBJECTIVE AND OBJECTIVE BOX
Patient seen and examined bedside resting comfortably.  No complaints offered. Reports mild discomfort at site of perc kinga tube.  No overnight events, afebrile. Denies n/v.    T(F): 97 (10-01-19 @ 04:50), Max: 98.8 (09-30-19 @ 23:13)  HR: 92 (10-01-19 @ 04:50) (60 - 92)  BP: 119/57 (10-01-19 @ 04:50) (119/57 - 132/62)  RR: 17 (10-01-19 @ 04:50) (16 - 18)  SpO2: 94% (10-01-19 @ 04:50) (94% - 97%)      PHYSICAL EXAM:  General: NAD, WDWN  HEENT: NCAT, EOMI, conjunctiva clear  Chest: respirations nonlabored  Abdomen: softly distended, nontender to palpation. Right side pigtail drain with bilious output, 175cc, slight tenderness at site, dressing clean and intact  Extremities: no pedal edema or calf tenderness noted     LABS:                        13.5   7.12  )-----------( 165      ( 01 Oct 2019 08:27 )             42.8     10-01    140  |  105  |  15  ----------------------------<  100<H>  3.5   |  27  |  0.99    Ca    8.2<L>      01 Oct 2019 08:27    TPro  6.8  /  Alb  3.1<L>  /  TBili  4.2<H>  /  DBili  x   /  AST  269<H>  /  ALT  160<H>  /  AlkPhos  128<H>  09-30    PT/INR - ( 01 Oct 2019 08:27 )   PT: 16.8 sec;   INR: 1.48 ratio    PTT - ( 30 Sep 2019 08:57 )  PTT:34.4 sec    Culture Results:   Testing in progress (09-30 @ 18:13)  Culture Results:   No growth (09-30 @ 10:05)      Impression: 89y Male admitted with ACUTE CHOLECYSTITIS, s/p IR placement of percutaneous cholecystostomy on 9/30, with resolved leukocytosis    PMH   Cholelithiasis  OA (osteoarthritis)  HLD (hyperlipidemia)  Dementia  Depression  Hypertension  DVT (deep venous thrombosis)      Plan:  as discussed with Dr Garcia, continue abx and advance diet as tolerated. Analgesia prn. Continue present medical management and supportive care. No surgical intervention planned

## 2019-10-01 NOTE — PROGRESS NOTE ADULT - ASSESSMENT
89 yo male s/p percutaneous intercostal transhepatic cholecystostomy tube placement.  Pt found to have acute cholecystitis 2/2 gallstones

## 2019-10-01 NOTE — PHYSICAL THERAPY INITIAL EVALUATION ADULT - IMPAIRMENTS FOUND, PT EVAL
aerobic capacity/endurance/ergonomics and body mechanics/muscle strength/gait, locomotion, and balance

## 2019-10-01 NOTE — PROVIDER CONTACT NOTE (CRITICAL VALUE NOTIFICATION) - TEST AND RESULT REPORTED:
Body fluid c/s positive gram stain/ No poly morpho nuclear lecuokocytes seen for low para filed , moderate gram positive rods in oil power filed

## 2019-10-01 NOTE — PHYSICAL THERAPY INITIAL EVALUATION ADULT - GENERAL OBSERVATIONS, REHAB EVAL
Pt is alert, cooperative, coherent, follow simple directions, with periods of confusion, disorientation and short attention span.

## 2019-10-01 NOTE — PHYSICAL THERAPY INITIAL EVALUATION ADULT - PATIENT/FAMILY AGREES WITH PLAN
PT called and spoke with patient's daughter Tete and agreed recommendation of home PT services when medically cleared by MD./yes

## 2019-10-01 NOTE — PROGRESS NOTE ADULT - PROBLEM SELECTOR PLAN 1
NPO for now, Continue IV Zosyn  AM labs.  D5NS at 60ml/hr Tramadol for pain  No DVT prop  IR consulted
-f/u final bile culture/fungal results  -continue abx  -monitor drainage output  -flush catheter daily  -pain management  -Pt can be discharged with catheter IR will f/u in 3 months for tube exchange
s/p ir choley tube  Continue IV Zosyn  AM labs.  D5NS at 60ml/hr Tramadol for pain

## 2019-10-01 NOTE — PHYSICAL THERAPY INITIAL EVALUATION ADULT - CRITERIA FOR SKILLED THERAPEUTIC INTERVENTIONS
risk reduction/prevention/functional limitations in following categories/anticipated discharge recommendation/impairments found

## 2019-10-01 NOTE — PHYSICAL THERAPY INITIAL EVALUATION ADULT - LIVES WITH, PROFILE
As per patient's daughter Kane the patient lives with her in  a private  house, has 6 steps to enter with wide rails, 12 steps to go to 2nd floor with rails, has HHA 7 hrs /day during weekdays and 4 hours a day Sat and Sun.

## 2019-10-01 NOTE — PHYSICAL THERAPY INITIAL EVALUATION ADULT - ADDITIONAL COMMENTS
As per patient's daughter prior to admission the patient is able to ambulate around the house includes going to 2nd floor bedroom with supervision and/or minimal  physical assistance

## 2019-10-01 NOTE — PROGRESS NOTE ADULT - SUBJECTIVE AND OBJECTIVE BOX
Patient is a 89y old  Male who presents with a chief complaint of abdominal pain (01 Oct 2019 10:32)      INTERVAL HPI/OVERNIGHT EVENTS: no events     MEDICATIONS  (STANDING):  carvedilol 3.125 milliGRAM(s) Oral every 12 hours  dextrose 5% + sodium chloride 0.9%. 1000 milliLiter(s) (60 mL/Hr) IV Continuous <Continuous>  escitalopram 10 milliGRAM(s) Oral daily  influenza   Vaccine 0.5 milliLiter(s) IntraMuscular once  memantine 5 milliGRAM(s) Oral two times a day  piperacillin/tazobactam IVPB.. 3.375 Gram(s) IV Intermittent every 8 hours    MEDICATIONS  (PRN):  traMADol 50 milliGRAM(s) Oral every 8 hours PRN Moderate Pain (4 - 6)      Allergies    No Known Allergies    Intolerances           Vital Signs Last 24 Hrs  T(C): 36.1 (01 Oct 2019 04:50), Max: 37.1 (30 Sep 2019 23:13)  T(F): 97 (01 Oct 2019 04:50), Max: 98.8 (30 Sep 2019 23:13)  HR: 92 (01 Oct 2019 04:50) (60 - 92)  BP: 119/57 (01 Oct 2019 04:50) (119/57 - 132/62)  BP(mean): --  RR: 17 (01 Oct 2019 04:50) (16 - 18)  SpO2: 94% (01 Oct 2019 04:50) (94% - 97%)    PHYSICAL EXAM:  GENERAL: NAD, well-groomed, well-developed  HEAD:  Atraumatic, Normocephalic  EYES: EOMI, PERRLA, conjunctiva and sclera clear  ENMT: No tonsillar erythema, exudates, or enlargement; Moist mucous membranes, Good dentition, No lesions  NECK: Supple, No JVD, Normal thyroid  CHEST/LUNG: Clear to percussion bilaterally; No rales, rhonchi, wheezing, or rubs  HEART: Regular rate and rhythm; No murmurs, rubs, or gallops  ABDOMEN: Soft, Nontender, Nondistended; Bowel sounds present, Choley tube in place draining bile  EXTREMITIES:  2+ Peripheral Pulses, No clubbing, cyanosis, or edema  LYMPH: No lymphadenopathy noted  SKIN: No rashes or lesions    LABS:                        13.5   7.12  )-----------( 165      ( 01 Oct 2019 08:27 )             42.8     10    140  |  105  |  15  ----------------------------<  100<H>  3.5   |  27  |  0.99    Ca    8.2<L>      01 Oct 2019 08:27    TPro  6.8  /  Alb  3.1<L>  /  TBili  4.2<H>  /  DBili  x   /  AST  269<H>  /  ALT  160<H>  /  AlkPhos  128<H>  09    PT/INR - ( 01 Oct 2019 08:27 )   PT: 16.8 sec;   INR: 1.48 ratio         PTT - ( 30 Sep 2019 08:57 )  PTT:34.4 sec  Urinalysis Basic - ( 30 Sep 2019 01:33 )    Color: Yellow / Appearance: Clear / S.015 / pH: x  Gluc: x / Ketone: Negative  / Bili: Negative / Urobili: 4 mg/dL   Blood: x / Protein: 15 mg/dL / Nitrite: Negative   Leuk Esterase: Negative / RBC: 0-2 /HPF / WBC 0-2   Sq Epi: x / Non Sq Epi: Occasional / Bacteria: Negative      CAPILLARY BLOOD GLUCOSE          RADIOLOGY & ADDITIONAL TESTS:    Imaging Personally Reviewed:  [ X] YES  [ ] NO    Consultant(s) Notes Reviewed:  [ X] YES  [ ] NO    Care Discussed with Consultants/Other Providers [X ] YES  [ ] NO

## 2019-10-01 NOTE — PHYSICAL THERAPY INITIAL EVALUATION ADULT - ACTIVE RANGE OF MOTION EXAMINATION, REHAB EVAL
allie. upper extremity Active ROM was WNL (within normal limits)/bilateral lower extremity Active ROM was WNL (within normal limits)

## 2019-10-01 NOTE — PROGRESS NOTE ADULT - PROBLEM SELECTOR PROBLEM 3
Osteoarthritis, unspecified osteoarthritis type, unspecified site
Osteoarthritis, unspecified osteoarthritis type, unspecified site

## 2019-10-02 LAB
-  AMIKACIN: SIGNIFICANT CHANGE UP
-  AMIKACIN: SIGNIFICANT CHANGE UP
-  AMOXICILLIN/CLAVULANIC ACID: SIGNIFICANT CHANGE UP
-  AMOXICILLIN/CLAVULANIC ACID: SIGNIFICANT CHANGE UP
-  AMPICILLIN/SULBACTAM: SIGNIFICANT CHANGE UP
-  AMPICILLIN: SIGNIFICANT CHANGE UP
-  AZTREONAM: SIGNIFICANT CHANGE UP
-  AZTREONAM: SIGNIFICANT CHANGE UP
-  CEFAZOLIN: SIGNIFICANT CHANGE UP
-  CEFEPIME: SIGNIFICANT CHANGE UP
-  CEFOTAXIME: SIGNIFICANT CHANGE UP
-  CEFOXITIN: SIGNIFICANT CHANGE UP
-  CEFOXITIN: SIGNIFICANT CHANGE UP
-  CEFTAZIDIME: SIGNIFICANT CHANGE UP
-  CEFTRIAXONE: SIGNIFICANT CHANGE UP
-  CEFTRIAXONE: SIGNIFICANT CHANGE UP
-  CEFUROXIME: SIGNIFICANT CHANGE UP
-  CIPROFLOXACIN: SIGNIFICANT CHANGE UP
-  CIPROFLOXACIN: SIGNIFICANT CHANGE UP
-  ERTAPENEM: SIGNIFICANT CHANGE UP
-  ERTAPENEM: SIGNIFICANT CHANGE UP
-  GENTAMICIN: SIGNIFICANT CHANGE UP
-  IMIPENEM: SIGNIFICANT CHANGE UP
-  IMIPENEM: SIGNIFICANT CHANGE UP
-  LEVOFLOXACIN: SIGNIFICANT CHANGE UP
-  LEVOFLOXACIN: SIGNIFICANT CHANGE UP
-  MEROPENEM: SIGNIFICANT CHANGE UP
-  MEROPENEM: SIGNIFICANT CHANGE UP
-  PIPERACILLIN/TAZOBACTAM: SIGNIFICANT CHANGE UP
-  TIGECYCLINE: SIGNIFICANT CHANGE UP
-  TIGECYCLINE: SIGNIFICANT CHANGE UP
-  TOBRAMYCIN: SIGNIFICANT CHANGE UP
-  TRIMETHOPRIM/SULFAMETHOXAZOLE: SIGNIFICANT CHANGE UP
-  TRIMETHOPRIM/SULFAMETHOXAZOLE: SIGNIFICANT CHANGE UP
APTT BLD: 31 SEC — SIGNIFICANT CHANGE UP (ref 28.5–37)
METHOD TYPE: SIGNIFICANT CHANGE UP
METHOD TYPE: SIGNIFICANT CHANGE UP

## 2019-10-02 PROCEDURE — 99231 SBSQ HOSP IP/OBS SF/LOW 25: CPT

## 2019-10-02 PROCEDURE — 99233 SBSQ HOSP IP/OBS HIGH 50: CPT

## 2019-10-02 PROCEDURE — 99024 POSTOP FOLLOW-UP VISIT: CPT

## 2019-10-02 RX ORDER — DOCUSATE SODIUM 100 MG
100 CAPSULE ORAL THREE TIMES A DAY
Refills: 0 | Status: DISCONTINUED | OUTPATIENT
Start: 2019-10-02 | End: 2019-10-04

## 2019-10-02 RX ORDER — SENNA PLUS 8.6 MG/1
2 TABLET ORAL AT BEDTIME
Refills: 0 | Status: DISCONTINUED | OUTPATIENT
Start: 2019-10-02 | End: 2019-10-04

## 2019-10-02 RX ORDER — CARVEDILOL PHOSPHATE 80 MG/1
3.12 CAPSULE, EXTENDED RELEASE ORAL EVERY 12 HOURS
Refills: 0 | Status: DISCONTINUED | OUTPATIENT
Start: 2019-10-02 | End: 2019-10-04

## 2019-10-02 RX ORDER — LACTULOSE 10 G/15ML
20 SOLUTION ORAL
Refills: 0 | Status: COMPLETED | OUTPATIENT
Start: 2019-10-02 | End: 2019-10-04

## 2019-10-02 RX ORDER — WARFARIN SODIUM 2.5 MG/1
5 TABLET ORAL ONCE
Refills: 0 | Status: COMPLETED | OUTPATIENT
Start: 2019-10-02 | End: 2019-10-02

## 2019-10-02 RX ORDER — FUROSEMIDE 40 MG
20 TABLET ORAL DAILY
Refills: 0 | Status: DISCONTINUED | OUTPATIENT
Start: 2019-10-02 | End: 2019-10-04

## 2019-10-02 RX ADMIN — PIPERACILLIN AND TAZOBACTAM 25 GRAM(S): 4; .5 INJECTION, POWDER, LYOPHILIZED, FOR SOLUTION INTRAVENOUS at 07:01

## 2019-10-02 RX ADMIN — Medication 30 MILLILITER(S): at 01:40

## 2019-10-02 RX ADMIN — LACTULOSE 20 GRAM(S): 10 SOLUTION ORAL at 22:08

## 2019-10-02 RX ADMIN — PIPERACILLIN AND TAZOBACTAM 25 GRAM(S): 4; .5 INJECTION, POWDER, LYOPHILIZED, FOR SOLUTION INTRAVENOUS at 22:07

## 2019-10-02 RX ADMIN — MEMANTINE HYDROCHLORIDE 5 MILLIGRAM(S): 10 TABLET ORAL at 05:24

## 2019-10-02 RX ADMIN — CARVEDILOL PHOSPHATE 3.12 MILLIGRAM(S): 80 CAPSULE, EXTENDED RELEASE ORAL at 05:24

## 2019-10-02 RX ADMIN — SENNA PLUS 2 TABLET(S): 8.6 TABLET ORAL at 22:08

## 2019-10-02 RX ADMIN — PIPERACILLIN AND TAZOBACTAM 25 GRAM(S): 4; .5 INJECTION, POWDER, LYOPHILIZED, FOR SOLUTION INTRAVENOUS at 15:30

## 2019-10-02 RX ADMIN — CARVEDILOL PHOSPHATE 3.12 MILLIGRAM(S): 80 CAPSULE, EXTENDED RELEASE ORAL at 17:13

## 2019-10-02 RX ADMIN — WARFARIN SODIUM 5 MILLIGRAM(S): 2.5 TABLET ORAL at 22:09

## 2019-10-02 RX ADMIN — Medication 100 MILLIGRAM(S): at 22:08

## 2019-10-02 RX ADMIN — MEMANTINE HYDROCHLORIDE 5 MILLIGRAM(S): 10 TABLET ORAL at 17:13

## 2019-10-02 RX ADMIN — TRAMADOL HYDROCHLORIDE 50 MILLIGRAM(S): 50 TABLET ORAL at 18:02

## 2019-10-02 RX ADMIN — ESCITALOPRAM OXALATE 10 MILLIGRAM(S): 10 TABLET, FILM COATED ORAL at 11:06

## 2019-10-02 RX ADMIN — TRAMADOL HYDROCHLORIDE 50 MILLIGRAM(S): 50 TABLET ORAL at 17:13

## 2019-10-02 NOTE — PROGRESS NOTE ADULT - ASSESSMENT
89yo M w/ history of Cholelithiasis, Dementia, Depression, bilateral DVT on coumadin, HLD, HTN and OA p/w acute cholecystitis.     Acute cholecystitis  - CT showed cholelithiasis and mild gallbladder wall thickening  - US showed gallstones with wall thickening and sonographic Soriano's sign consistent with cholecystitis  - s/p IR choley tube on 9/30  - c/w IV Zosyn  - c/w IVF   - c/w Tramadol for pain     Hx of  DVT of femoral vein  - s/p IVC filter    OA  - c/w Tramadol    Essential hypertension  - c/w Carvedilol     Chronic atrial fibrillation  - c/w coreg and coumadin    Depression and dementia  - c/w Lexapro and Namenda     Prophylaxis:  DVT: Coumadin  GI: PO diet 89yo M w/ history of Cholelithiasis, Dementia, Depression, bilateral DVT on coumadin, HLD, HTN and OA p/w acute cholecystitis.     Acute cholecystitis  - CT showed cholelithiasis and mild gallbladder wall thickening  - US showed gallstones with wall thickening and sonographic Soriano's sign consistent with cholecystitis  - s/p IR choley tube on 9/30  - c/w IV Zosyn  - c/w IVF   - c/w Tramadol for pain     B/l LE edema  - Echo in march showed an EF of 60-65% w/ grade I diastolic dysfunction  - c/w home coreg and resume home Lasix    Hx of  DVT of femoral vein  - s/p IVC filter    OA  - c/w Tramadol    Essential hypertension  - c/w Carvedilol     Chronic atrial fibrillation  - c/w coreg and coumadin    Depression and dementia  - c/w Lexapro and Namenda     Prophylaxis:  DVT: Coumadin  GI: PO diet

## 2019-10-02 NOTE — PROGRESS NOTE ADULT - SUBJECTIVE AND OBJECTIVE BOX
89yo M w/ history of Cholelithiasis, Dementia, Depression, bilateral DVT on coumadin, HLD, HTN and OA p/w acute cholecystitis. He is lying in bed in NAD.    MEDICATIONS  (STANDING):  carvedilol 3.125 milliGRAM(s) Oral every 12 hours  escitalopram 10 milliGRAM(s) Oral daily  influenza   Vaccine 0.5 milliLiter(s) IntraMuscular once  memantine 5 milliGRAM(s) Oral two times a day  piperacillin/tazobactam IVPB.. 3.375 Gram(s) IV Intermittent every 8 hours    MEDICATIONS  (PRN):  traMADol 50 milliGRAM(s) Oral every 8 hours PRN Moderate Pain (4 - 6)      Allergies    No Known Allergies    Intolerances        Vital Signs Last 24 Hrs  T(C): 37.7 (02 Oct 2019 16:30), Max: 37.7 (02 Oct 2019 16:30)  T(F): 99.8 (02 Oct 2019 16:30), Max: 99.8 (02 Oct 2019 16:30)  HR: 90 (02 Oct 2019 16:30) (63 - 90)  BP: 129/72 (02 Oct 2019 16:30) (113/74 - 130/64)  BP(mean): --  RR: 17 (02 Oct 2019 16:30) (16 - 17)  SpO2: 96% (02 Oct 2019 16:30) (95% - 97%)    PHYSICAL EXAM:  GENERAL: NAD, well-groomed, well-developed  HEAD:  Atraumatic, Normocephalic  EYES: EOMI, PERRLA   NECK: Supple   NERVOUS SYSTEM:  Alert    CHEST/LUNG: Clear to auscultation bilaterally; No rales, rhonchi, wheezing, or rubs  HEART: Regular rate and rhythm; No murmurs, rubs, or gallops  ABDOMEN: Soft, Nontender, Nondistended; Bowel sounds present  EXTREMITIES: No clubbing, cyanosis, or edema     LABS:                        13.5   7.12  )-----------( 165      ( 01 Oct 2019 08:27 )             42.8     10-01    140  |  105  |  15  ----------------------------<  100<H>  3.5   |  27  |  0.99    Ca    8.2<L>      01 Oct 2019 08:27      PT/INR - ( 01 Oct 2019 08:27 )   PT: 16.8 sec;   INR: 1.48 ratio         PTT - ( 02 Oct 2019 07:15 )  PTT:31.0 sec    CAPILLARY BLOOD GLUCOSE     Culture - Fungal, Body Fluid (collected 30 Sep 2019 18:13)  Source: .Body Fluid Bile  Preliminary Report (01 Oct 2019 08:36):    Testing in progress    Culture - Body Fluid with Gram Stain (collected 30 Sep 2019 18:13)  Source: .Body Fluid bile  Gram Stain (prelim) (01 Oct 2019 19:16):    No polymorphonuclear leukocytes seen per low power field    Moderate Gram Positive Rods seen per oil power field  Preliminary Report (02 Oct 2019 17:38):    Numerous Escherichia coli    Moderate Klebsiella pneumoniae  Organism: Escherichia coli  Klebsiella pneumoniae (02 Oct 2019 17:32)  Organism: Klebsiella pneumoniae (02 Oct 2019 17:32)  Organism: Escherichia coli (02 Oct 2019 17:32)    Culture - Urine (collected 30 Sep 2019 10:05)  Source: .Urine  Final Report (01 Oct 2019 07:56):    No growth      RADIOLOGY & ADDITIONAL TESTS:    10-01-19 @ 07:01  -  10-02-19 @ 07:00  --------------------------------------------------------  IN:    dextrose 5% + sodium chloride 0.9%: 600 mL  Total IN: 600 mL    OUT:    Drain: 180 mL  Total OUT: 180 mL    Total NET: 420 mL 89yo M w/ history of Cholelithiasis, Dementia, Depression, bilateral DVT on coumadin, HLD, HTN and OA p/w acute cholecystitis. He is lying in bed in NAD.    MEDICATIONS  (STANDING):  carvedilol 3.125 milliGRAM(s) Oral every 12 hours  escitalopram 10 milliGRAM(s) Oral daily  influenza   Vaccine 0.5 milliLiter(s) IntraMuscular once  memantine 5 milliGRAM(s) Oral two times a day  piperacillin/tazobactam IVPB.. 3.375 Gram(s) IV Intermittent every 8 hours    MEDICATIONS  (PRN):  traMADol 50 milliGRAM(s) Oral every 8 hours PRN Moderate Pain (4 - 6)      Allergies    No Known Allergies    Intolerances        Vital Signs Last 24 Hrs  T(C): 37.7 (02 Oct 2019 16:30), Max: 37.7 (02 Oct 2019 16:30)  T(F): 99.8 (02 Oct 2019 16:30), Max: 99.8 (02 Oct 2019 16:30)  HR: 90 (02 Oct 2019 16:30) (63 - 90)  BP: 129/72 (02 Oct 2019 16:30) (113/74 - 130/64)  BP(mean): --  RR: 17 (02 Oct 2019 16:30) (16 - 17)  SpO2: 96% (02 Oct 2019 16:30) (95% - 97%)    PHYSICAL EXAM:  GENERAL: NAD, well-groomed, well-developed  HEAD:  Atraumatic, Normocephalic  EYES: EOMI, PERRLA   NECK: Supple   NERVOUS SYSTEM:  Alert    CHEST/LUNG: Clear to auscultation bilaterally; No rales, rhonchi, wheezing, or rubs  HEART: Regular rate and rhythm; No murmurs, rubs, or gallops  ABDOMEN: Soft, Nontender, distended; hypoactive bowel sounds   EXTREMITIES: mild bilateral LE edema     LABS:                        13.5   7.12  )-----------( 165      ( 01 Oct 2019 08:27 )             42.8     10-01    140  |  105  |  15  ----------------------------<  100<H>  3.5   |  27  |  0.99    Ca    8.2<L>      01 Oct 2019 08:27      PT/INR - ( 01 Oct 2019 08:27 )   PT: 16.8 sec;   INR: 1.48 ratio         PTT - ( 02 Oct 2019 07:15 )  PTT:31.0 sec    CAPILLARY BLOOD GLUCOSE     Culture - Fungal, Body Fluid (collected 30 Sep 2019 18:13)  Source: .Body Fluid Bile  Preliminary Report (01 Oct 2019 08:36):    Testing in progress    Culture - Body Fluid with Gram Stain (collected 30 Sep 2019 18:13)  Source: .Body Fluid bile  Gram Stain (prelim) (01 Oct 2019 19:16):    No polymorphonuclear leukocytes seen per low power field    Moderate Gram Positive Rods seen per oil power field  Preliminary Report (02 Oct 2019 17:38):    Numerous Escherichia coli    Moderate Klebsiella pneumoniae  Organism: Escherichia coli  Klebsiella pneumoniae (02 Oct 2019 17:32)  Organism: Klebsiella pneumoniae (02 Oct 2019 17:32)  Organism: Escherichia coli (02 Oct 2019 17:32)    Culture - Urine (collected 30 Sep 2019 10:05)  Source: .Urine  Final Report (01 Oct 2019 07:56):    No growth      RADIOLOGY & ADDITIONAL TESTS:    10-01-19 @ 07:01  -  10-02-19 @ 07:00  --------------------------------------------------------  IN:    dextrose 5% + sodium chloride 0.9%: 600 mL  Total IN: 600 mL    OUT:    Drain: 180 mL  Total OUT: 180 mL    Total NET: 420 mL

## 2019-10-02 NOTE — PROGRESS NOTE ADULT - SUBJECTIVE AND OBJECTIVE BOX
Surgery NP note  Patient seen and examined bedside resting comfortably.  No complaints offered. Minimal Abdominal pain at drain site   Denies nausea and vomiting. Tolerating diet.  Normal flatus/BM.     T(F): 97.2 (10-02-19 @ 11:11), Max: 98.5 (10-01-19 @ 13:43)  HR: 76 (10-02-19 @ 05:19) (63 - 81)  BP: 130/64 (10-02-19 @ 11:11) (113/74 - 130/64)  RR: 16 (10-02-19 @ 11:11) (16 - 18)  SpO2: 96% (10-02-19 @ 11:11) (95% - 98%)  Wt(kg): --  CAPILLARY BLOOD GLUCOSE          PHYSICAL EXAM:  General: NAD, WDWN.   Neuro:  Alert & responsive  HEENT: NCAT, EOMI, conjunctiva clear  CV: +S1+S2 regular rate and rhythm  Lung: clear to ausculation bilaterally, respirations nonlabored, good inspiratory effort  Abdomen: + Distension, Non tender,  Normal active BS. Cholecystostomy tube in SITU with bilious output  Extremities: no pedal edema or calf tenderness noted     LABS:                        13.5   7.12  )-----------( 165      ( 01 Oct 2019 08:27 )             42.8     10-01    140  |  105  |  15  ----------------------------<  100<H>  3.5   |  27  |  0.99    Ca    8.2<L>      01 Oct 2019 08:27        PT/INR - ( 01 Oct 2019 08:27 )   PT: 16.8 sec;   INR: 1.48 ratio         PTT - ( 02 Oct 2019 07:15 )  PTT:31.0 sec  I&O's Detail    01 Oct 2019 07:01  -  02 Oct 2019 07:00  --------------------------------------------------------  IN:    dextrose 5% + sodium chloride 0.9%: 600 mL  Total IN: 600 mL    OUT:    Drain: 180 mL  Total OUT: 180 mL    Total NET: 420 mL    Impression: 89y Male admitted with ACUTE CHOLECYSTITIS, s/p IR placement of percutaneous cholecystostomy on 9/30, with resolved leukocytosis    PMH   Cholelithiasis  OA (osteoarthritis)  HLD (hyperlipidemia)  Dementia  Depression  Hypertension  DVT (deep venous thrombosis)      Plan:  - continue abx and Continue regular Low fat diet as tolerated.   - Continue present medical management and supportive care.   - No surgical intervention planned

## 2019-10-03 ENCOUNTER — TRANSCRIPTION ENCOUNTER (OUTPATIENT)
Age: 84
End: 2019-10-03

## 2019-10-03 LAB
ALBUMIN SERPL ELPH-MCNC: 2.8 G/DL — LOW (ref 3.3–5)
ALP SERPL-CCNC: 114 U/L — SIGNIFICANT CHANGE UP (ref 40–120)
ALT FLD-CCNC: 52 U/L — SIGNIFICANT CHANGE UP (ref 12–78)
ANION GAP SERPL CALC-SCNC: 6 MMOL/L — SIGNIFICANT CHANGE UP (ref 5–17)
AST SERPL-CCNC: 25 U/L — SIGNIFICANT CHANGE UP (ref 15–37)
BILIRUB SERPL-MCNC: 1 MG/DL — SIGNIFICANT CHANGE UP (ref 0.2–1.2)
BUN SERPL-MCNC: 15 MG/DL — SIGNIFICANT CHANGE UP (ref 7–23)
CALCIUM SERPL-MCNC: 8.1 MG/DL — LOW (ref 8.5–10.1)
CHLORIDE SERPL-SCNC: 108 MMOL/L — SIGNIFICANT CHANGE UP (ref 96–108)
CO2 SERPL-SCNC: 28 MMOL/L — SIGNIFICANT CHANGE UP (ref 22–31)
CREAT SERPL-MCNC: 0.97 MG/DL — SIGNIFICANT CHANGE UP (ref 0.5–1.3)
GLUCOSE SERPL-MCNC: 105 MG/DL — HIGH (ref 70–99)
HCT VFR BLD CALC: 40.5 % — SIGNIFICANT CHANGE UP (ref 39–50)
HGB BLD-MCNC: 12.9 G/DL — LOW (ref 13–17)
INR BLD: 1.15 RATIO — SIGNIFICANT CHANGE UP (ref 0.88–1.16)
MAGNESIUM SERPL-MCNC: 2.6 MG/DL — SIGNIFICANT CHANGE UP (ref 1.6–2.6)
MCHC RBC-ENTMCNC: 27.4 PG — SIGNIFICANT CHANGE UP (ref 27–34)
MCHC RBC-ENTMCNC: 31.9 GM/DL — LOW (ref 32–36)
MCV RBC AUTO: 86 FL — SIGNIFICANT CHANGE UP (ref 80–100)
NRBC # BLD: 0 /100 WBCS — SIGNIFICANT CHANGE UP (ref 0–0)
PHOSPHATE SERPL-MCNC: 2.3 MG/DL — LOW (ref 2.5–4.5)
PLATELET # BLD AUTO: 184 K/UL — SIGNIFICANT CHANGE UP (ref 150–400)
POTASSIUM SERPL-MCNC: 3.7 MMOL/L — SIGNIFICANT CHANGE UP (ref 3.5–5.3)
POTASSIUM SERPL-SCNC: 3.7 MMOL/L — SIGNIFICANT CHANGE UP (ref 3.5–5.3)
PROT SERPL-MCNC: 7 GM/DL — SIGNIFICANT CHANGE UP (ref 6–8.3)
PROTHROM AB SERPL-ACNC: 12.9 SEC — SIGNIFICANT CHANGE UP (ref 10–12.9)
RBC # BLD: 4.71 M/UL — SIGNIFICANT CHANGE UP (ref 4.2–5.8)
RBC # FLD: 17.4 % — HIGH (ref 10.3–14.5)
SODIUM SERPL-SCNC: 142 MMOL/L — SIGNIFICANT CHANGE UP (ref 135–145)
WBC # BLD: 4.33 K/UL — SIGNIFICANT CHANGE UP (ref 3.8–10.5)
WBC # FLD AUTO: 4.33 K/UL — SIGNIFICANT CHANGE UP (ref 3.8–10.5)

## 2019-10-03 PROCEDURE — 76937 US GUIDE VASCULAR ACCESS: CPT | Mod: 26

## 2019-10-03 PROCEDURE — 71045 X-RAY EXAM CHEST 1 VIEW: CPT | Mod: 26

## 2019-10-03 PROCEDURE — 99233 SBSQ HOSP IP/OBS HIGH 50: CPT

## 2019-10-03 PROCEDURE — 36410 VNPNXR 3YR/> PHY/QHP DX/THER: CPT

## 2019-10-03 PROCEDURE — 99231 SBSQ HOSP IP/OBS SF/LOW 25: CPT

## 2019-10-03 RX ORDER — WARFARIN SODIUM 2.5 MG/1
5 TABLET ORAL ONCE
Refills: 0 | Status: COMPLETED | OUTPATIENT
Start: 2019-10-03 | End: 2019-10-03

## 2019-10-03 RX ORDER — SODIUM,POTASSIUM PHOSPHATES 278-250MG
1 POWDER IN PACKET (EA) ORAL
Refills: 0 | Status: COMPLETED | OUTPATIENT
Start: 2019-10-03 | End: 2019-10-04

## 2019-10-03 RX ORDER — FUROSEMIDE 40 MG
40 TABLET ORAL ONCE
Refills: 0 | Status: COMPLETED | OUTPATIENT
Start: 2019-10-03 | End: 2019-10-03

## 2019-10-03 RX ADMIN — PIPERACILLIN AND TAZOBACTAM 25 GRAM(S): 4; .5 INJECTION, POWDER, LYOPHILIZED, FOR SOLUTION INTRAVENOUS at 06:35

## 2019-10-03 RX ADMIN — Medication 100 MILLIGRAM(S): at 22:11

## 2019-10-03 RX ADMIN — Medication 40 MILLIGRAM(S): at 17:34

## 2019-10-03 RX ADMIN — PIPERACILLIN AND TAZOBACTAM 25 GRAM(S): 4; .5 INJECTION, POWDER, LYOPHILIZED, FOR SOLUTION INTRAVENOUS at 22:13

## 2019-10-03 RX ADMIN — PIPERACILLIN AND TAZOBACTAM 25 GRAM(S): 4; .5 INJECTION, POWDER, LYOPHILIZED, FOR SOLUTION INTRAVENOUS at 13:30

## 2019-10-03 RX ADMIN — Medication 1 PACKET(S): at 17:34

## 2019-10-03 RX ADMIN — CARVEDILOL PHOSPHATE 3.12 MILLIGRAM(S): 80 CAPSULE, EXTENDED RELEASE ORAL at 17:34

## 2019-10-03 RX ADMIN — WARFARIN SODIUM 5 MILLIGRAM(S): 2.5 TABLET ORAL at 22:11

## 2019-10-03 RX ADMIN — Medication 100 MILLIGRAM(S): at 13:30

## 2019-10-03 RX ADMIN — MEMANTINE HYDROCHLORIDE 5 MILLIGRAM(S): 10 TABLET ORAL at 06:36

## 2019-10-03 RX ADMIN — Medication 20 MILLIGRAM(S): at 06:36

## 2019-10-03 RX ADMIN — Medication 1 PACKET(S): at 11:13

## 2019-10-03 RX ADMIN — Medication 1 PACKET(S): at 22:11

## 2019-10-03 RX ADMIN — LACTULOSE 20 GRAM(S): 10 SOLUTION ORAL at 17:34

## 2019-10-03 RX ADMIN — LACTULOSE 20 GRAM(S): 10 SOLUTION ORAL at 09:46

## 2019-10-03 RX ADMIN — MEMANTINE HYDROCHLORIDE 5 MILLIGRAM(S): 10 TABLET ORAL at 17:34

## 2019-10-03 RX ADMIN — SENNA PLUS 2 TABLET(S): 8.6 TABLET ORAL at 22:11

## 2019-10-03 RX ADMIN — TRAMADOL HYDROCHLORIDE 50 MILLIGRAM(S): 50 TABLET ORAL at 22:11

## 2019-10-03 RX ADMIN — Medication 100 MILLIGRAM(S): at 06:36

## 2019-10-03 RX ADMIN — ESCITALOPRAM OXALATE 10 MILLIGRAM(S): 10 TABLET, FILM COATED ORAL at 11:13

## 2019-10-03 RX ADMIN — CARVEDILOL PHOSPHATE 3.12 MILLIGRAM(S): 80 CAPSULE, EXTENDED RELEASE ORAL at 06:36

## 2019-10-03 NOTE — DISCHARGE NOTE PROVIDER - HOSPITAL COURSE
89yo M w/ history of Cholelithiasis, Dementia, Depression, bilateral DVT status post IVC filter on coumadin, HLD, HTN and OA p/w acute cholecystitis. CT showed cholelithiasis and mild gallbladder wall thickening. US showed gallstones with wall thickening and sonographic Soriano's sign consistent with cholecystitis. Pt underwent IR choley tube on 9/30 as surgery reported that she was a poor surgical candidate. As per my conversation w/ surgery, she has been cleared for discharge from surgery w/ 10 more days of IV Zosyn. She will need a tube study in 2 weeks as per surgery. She was given phos for hypophosphatemia today. She was also found to have mild B/l LE edema. Echo in march showed an EF of 60-65% w/ grade I diastolic dysfunction and patient is on coreg and Lasix.         Discharge time: 43 minutes 89yo M w/ history of Cholelithiasis, Dementia, Depression, bilateral DVT on coumadin, HLD, HTN and OA p/w acute cholecystitis.         Acute cholecystitis    - CT showed cholelithiasis and mild gallbladder wall thickening    - US showed gallstones with wall thickening and sonographic Soriano's sign consistent with cholecystitis    - s/p IR choley tube on 9/30 - as per surgery he will need a tube check in ~ 2 weeks in rehab    - c/w IV Zosyn - as per surgery he will need 9 more days    - c/w Tramadol for pain         B/l LE edema    - Echo in march showed an EF of 60-65% w/ grade I diastolic dysfunction    - c/w home coreg and home Lasix    - will give an extra dose of IV Lasix today as patient had crackles on exam         Hx of  DVT of femoral vein    - s/p IVC filter        OA    - c/w Tramadol        Essential hypertension    - c/w Carvedilol         Chronic atrial fibrillation    - c/w coreg and coumadin        Depression and dementia    - c/w Lexapro and Namenda         hypophosphatemia    - replete w/ PO phos             45min spent on dc planning

## 2019-10-03 NOTE — DISCHARGE NOTE PROVIDER - NSDCCPCAREPLAN_GEN_ALL_CORE_FT
PRINCIPAL DISCHARGE DIAGNOSIS  Diagnosis: Acute cholecystitis  Assessment and Plan of Treatment: PRINCIPAL DISCHARGE DIAGNOSIS  Diagnosis: Acute cholecystitis  Assessment and Plan of Treatment: finish antibiotics

## 2019-10-03 NOTE — PROGRESS NOTE ADULT - ASSESSMENT
89yo M w/ history of Cholelithiasis, Dementia, Depression, bilateral DVT on coumadin, HLD, HTN and OA p/w acute cholecystitis.     Acute cholecystitis  - CT showed cholelithiasis and mild gallbladder wall thickening  - US showed gallstones with wall thickening and sonographic Soriano's sign consistent with cholecystitis  - s/p IR choley tube on 9/30 - as per surgery he will need a tube check in ~ 2 weeks in rehab  - c/w IV Zosyn - as per surgery he will need 10 more days  - c/w Tramadol for pain     B/l LE edema  - Echo in march showed an EF of 60-65% w/ grade I diastolic dysfunction  - c/w home coreg and home Lasix  - will give an extra dose of IV Lasix today as patient had crackles on exam     Hx of  DVT of femoral vein  - s/p IVC filter    OA  - c/w Tramadol    Essential hypertension  - c/w Carvedilol     Chronic atrial fibrillation  - c/w coreg and coumadin    Depression and dementia  - c/w Lexapro and Namenda     hypophosphatemia  - replete w/ PO phos    Prophylaxis:  DVT: Coumadin  GI: PO diet

## 2019-10-03 NOTE — DISCHARGE NOTE PROVIDER - CARE PROVIDER_API CALL
Dewayne Rueda)  Surgery  [address] [facility], [address]  [place] [address]  Phone: [phone]  Fax: [phone]  Follow Up Time:

## 2019-10-03 NOTE — PROGRESS NOTE ADULT - SUBJECTIVE AND OBJECTIVE BOX
INTERVAL HPI/OVERNIGHT EVENTS:  Patient seen sitting comfortably in chair beside the bed, eating breakfast. Patient reports mild discomfort to the right upper abdomen on inspiration.  Abdominal pain continues to improve.  Tolerating diet with no complaint of nausea/vomiting.  +Flatus/BM.         Vital Signs Last 24 Hrs  T(C): 37.1 (03 Oct 2019 05:10), Max: 37.7 (02 Oct 2019 16:30)  T(F): 98.7 (03 Oct 2019 05:10), Max: 99.8 (02 Oct 2019 16:30)  HR: 73 (03 Oct 2019 05:10) (73 - 90)  BP: 137/76 (03 Oct 2019 05:10) (112/76 - 137/76)  BP(mean): --  RR: 18 (03 Oct 2019 05:10) (16 - 20)  SpO2: 96% (03 Oct 2019 05:10) (94% - 98%)    MEDICATIONS  (STANDING):  carvedilol 3.125 milliGRAM(s) Oral every 12 hours  docusate sodium 100 milliGRAM(s) Oral three times a day  escitalopram 10 milliGRAM(s) Oral daily  furosemide    Tablet 20 milliGRAM(s) Oral daily  influenza   Vaccine 0.5 milliLiter(s) IntraMuscular once  lactulose Syrup 20 Gram(s) Oral two times a day  memantine 5 milliGRAM(s) Oral two times a day  piperacillin/tazobactam IVPB.. 3.375 Gram(s) IV Intermittent every 8 hours  potassium phosphate / sodium phosphate powder 1 Packet(s) Oral four times a day with meals  senna 2 Tablet(s) Oral at bedtime    MEDICATIONS  (PRN):  traMADol 50 milliGRAM(s) Oral every 8 hours PRN Moderate Pain (4 - 6)      PHYSICAL EXAM:    GENERAL: NAD  HEAD:  Atraumatic, Normocephalic  EYES: EOMI, PERRLA, conjunctiva and sclera clear  CHEST/LUNG: Clear to ausculation, bilaterally   HEART: S1S2  ABDOMEN: RUQ cholecystectomy tube in place C/D/I, no surrounding erythema 200cc/bilous output/24 hours,  mildly distended, +BS, soft, non tender to palpation, no guarding.   EXTREMITIES:  calf soft, non tender     I&O's Detail    02 Oct 2019 07:01  -  03 Oct 2019 07:00  --------------------------------------------------------  IN:  Total IN: 0 mL    OUT:    Drain: 200 mL  Total OUT: 200 mL    Total NET: -200 mL          LABS:                        12.9   4.33  )-----------( 184      ( 03 Oct 2019 07:10 )             40.5     10-03    142  |  108  |  15  ----------------------------<  105<H>  3.7   |  28  |  0.97    Ca    8.1<L>      03 Oct 2019 07:10  Phos  2.3     10-03  Mg     2.6     10-03    TPro  7.0  /  Alb  2.8<L>  /  TBili  1.0  /  DBili  x   /  AST  25  /  ALT  52  /  AlkPhos  114  10-03    PT/INR - ( 03 Oct 2019 07:10 )   PT: 12.9 sec;   INR: 1.15 ratio         PTT - ( 02 Oct 2019 07:15 )  PTT:31.0 sec    Culture Results:   Numerous Escherichia coli  Moderate Klebsiella pneumoniae (09-30 @ 18:13)  Culture Results:   Testing in progress (09-30 @ 18:13)    type    RADIOLOGY & ADDITIONAL STUDIES:    Impression:  89 year old male pmhx HLD, HTN, Dementia, DVT, Cholelithiasis; admitted for acute cholecystitis, s/p percutaneous cholecystostomy placement, with resolved pain and leukocytosis.     Plan:  continue diet as tolerated   continue abx   pain management   Aixa tube drain.  IR follow up.  for tube check.  Monitor output.    serial abdominal exam   cont medical management/supportive care   prophylactic measure:  IS, venodynes, DVT ppx,  OOB to ambulate as tolerated inc activity   OK to DC on surgery standpoint to complete course of abx and follow up with IR for tube check   follow up with Dr. Rueda in 2 weeks   discussed case with Primary service.    discussed case with surgical attending

## 2019-10-03 NOTE — PROGRESS NOTE ADULT - SUBJECTIVE AND OBJECTIVE BOX
89yo M w/ history of Cholelithiasis, Dementia, Depression, bilateral DVT on coumadin, HLD, HTN and OA p/w acute cholecystitis. He is lying in bed in NAD.    MEDICATIONS  (STANDING):  carvedilol 3.125 milliGRAM(s) Oral every 12 hours  escitalopram 10 milliGRAM(s) Oral daily  influenza   Vaccine 0.5 milliLiter(s) IntraMuscular once  memantine 5 milliGRAM(s) Oral two times a day  piperacillin/tazobactam IVPB.. 3.375 Gram(s) IV Intermittent every 8 hours    MEDICATIONS  (PRN):  traMADol 50 milliGRAM(s) Oral every 8 hours PRN Moderate Pain (4 - 6)      Allergies    No Known Allergies    Intolerances        Vital Signs Last 24 Hrs  T(C): 37.7 (02 Oct 2019 16:30), Max: 37.7 (02 Oct 2019 16:30)  T(F): 99.8 (02 Oct 2019 16:30), Max: 99.8 (02 Oct 2019 16:30)  HR: 90 (02 Oct 2019 16:30) (63 - 90)  BP: 129/72 (02 Oct 2019 16:30) (113/74 - 130/64)  BP(mean): --  RR: 17 (02 Oct 2019 16:30) (16 - 17)  SpO2: 96% (02 Oct 2019 16:30) (95% - 97%)    PHYSICAL EXAM:  GENERAL: NAD, well-groomed, well-developed  HEAD:  Atraumatic, Normocephalic  EYES: EOMI, PERRLA   NECK: Supple   NERVOUS SYSTEM:  Alert    CHEST/LUNG: Crackles and wheezing bilateral  HEART: Regular rate and rhythm; No murmurs, rubs, or gallops  ABDOMEN: Soft, Nontender, distended; hypoactive bowel sounds   EXTREMITIES: mild bilateral LE edema    LABS:                        12.9   4.33  )-----------( 184      ( 03 Oct 2019 07:10 )             40.5     10-03    142  |  108  |  15  ----------------------------<  105<H>  3.7   |  28  |  0.97    Ca    8.1<L>      03 Oct 2019 07:10  Phos  2.3     10-03  Mg     2.6     10-03    TPro  7.0  /  Alb  2.8<L>  /  TBili  1.0  /  DBili  x   /  AST  25  /  ALT  52  /  AlkPhos  114  10-03    PT/INR - ( 03 Oct 2019 07:10 )   PT: 12.9 sec;   INR: 1.15 ratio         PTT - ( 02 Oct 2019 07:15 )  PTT:31.0 sec           Culture - Fungal, Body Fluid (collected 30 Sep 2019 18:13)  Source: .Body Fluid Bile  Preliminary Report (01 Oct 2019 08:36):    Testing in progress    Culture - Body Fluid with Gram Stain (collected 30 Sep 2019 18:13)  Source: .Body Fluid bile  Gram Stain (prelim) (01 Oct 2019 19:16):    No polymorphonuclear leukocytes seen per low power field    Moderate Gram Positive Rods seen per oil power field  Preliminary Report (02 Oct 2019 17:38):    Numerous Escherichia coli    Moderate Klebsiella pneumoniae  Organism: Escherichia coli  Klebsiella pneumoniae (02 Oct 2019 17:32)  Organism: Klebsiella pneumoniae (02 Oct 2019 17:32)  Organism: Escherichia coli (02 Oct 2019 17:32)    Culture - Urine (collected 30 Sep 2019 10:05)  Source: .Urine  Final Report (01 Oct 2019 07:56):    No growth      RADIOLOGY & ADDITIONAL TESTS:    10-02-19 @ 07:01  -  10-03-19 @ 07:00  --------------------------------------------------------  IN:  Total IN: 0 mL    OUT:    Drain: 200 mL  Total OUT: 200 mL    Total NET: -200 mL      10-03-19 @ 07:01  -  10-03-19 @ 19:33  --------------------------------------------------------  IN:    IV PiggyBack: 100 mL    Oral Fluid: 240 mL  Total IN: 340 mL    OUT:    Drain: 150 mL  Total OUT: 150 mL    Total NET: 190 mL

## 2019-10-03 NOTE — PROCEDURE NOTE - ADDITIONAL PROCEDURE DETAILS
pt s/p midline placement inserted into right basilic vein under US guidance.  4fr x 20cm SL.  Pt tolerated procedure well  -midline can be accessed

## 2019-10-04 ENCOUNTER — TRANSCRIPTION ENCOUNTER (OUTPATIENT)
Age: 84
End: 2019-10-04

## 2019-10-04 VITALS
RESPIRATION RATE: 17 BRPM | DIASTOLIC BLOOD PRESSURE: 66 MMHG | OXYGEN SATURATION: 97 % | SYSTOLIC BLOOD PRESSURE: 132 MMHG | TEMPERATURE: 97 F

## 2019-10-04 LAB
ALBUMIN SERPL ELPH-MCNC: 3 G/DL — LOW (ref 3.3–5)
ALP SERPL-CCNC: 115 U/L — SIGNIFICANT CHANGE UP (ref 40–120)
ALT FLD-CCNC: 46 U/L — SIGNIFICANT CHANGE UP (ref 12–78)
ANION GAP SERPL CALC-SCNC: 7 MMOL/L — SIGNIFICANT CHANGE UP (ref 5–17)
AST SERPL-CCNC: 26 U/L — SIGNIFICANT CHANGE UP (ref 15–37)
BILIRUB SERPL-MCNC: 1 MG/DL — SIGNIFICANT CHANGE UP (ref 0.2–1.2)
BUN SERPL-MCNC: 10 MG/DL — SIGNIFICANT CHANGE UP (ref 7–23)
CALCIUM SERPL-MCNC: 8.6 MG/DL — SIGNIFICANT CHANGE UP (ref 8.5–10.1)
CHLORIDE SERPL-SCNC: 105 MMOL/L — SIGNIFICANT CHANGE UP (ref 96–108)
CO2 SERPL-SCNC: 29 MMOL/L — SIGNIFICANT CHANGE UP (ref 22–31)
CREAT SERPL-MCNC: 0.88 MG/DL — SIGNIFICANT CHANGE UP (ref 0.5–1.3)
GLUCOSE SERPL-MCNC: 92 MG/DL — SIGNIFICANT CHANGE UP (ref 70–99)
HCT VFR BLD CALC: 42.2 % — SIGNIFICANT CHANGE UP (ref 39–50)
HGB BLD-MCNC: 13.2 G/DL — SIGNIFICANT CHANGE UP (ref 13–17)
INR BLD: 1.47 RATIO — HIGH (ref 0.88–1.16)
MAGNESIUM SERPL-MCNC: 2.7 MG/DL — HIGH (ref 1.6–2.6)
MCHC RBC-ENTMCNC: 26.8 PG — LOW (ref 27–34)
MCHC RBC-ENTMCNC: 31.3 GM/DL — LOW (ref 32–36)
MCV RBC AUTO: 85.6 FL — SIGNIFICANT CHANGE UP (ref 80–100)
NRBC # BLD: 0 /100 WBCS — SIGNIFICANT CHANGE UP (ref 0–0)
PHOSPHATE SERPL-MCNC: 2.9 MG/DL — SIGNIFICANT CHANGE UP (ref 2.5–4.5)
PLATELET # BLD AUTO: 189 K/UL — SIGNIFICANT CHANGE UP (ref 150–400)
POTASSIUM SERPL-MCNC: 3.8 MMOL/L — SIGNIFICANT CHANGE UP (ref 3.5–5.3)
POTASSIUM SERPL-SCNC: 3.8 MMOL/L — SIGNIFICANT CHANGE UP (ref 3.5–5.3)
PROT SERPL-MCNC: 7.3 GM/DL — SIGNIFICANT CHANGE UP (ref 6–8.3)
PROTHROM AB SERPL-ACNC: 16.7 SEC — HIGH (ref 10–12.9)
RBC # BLD: 4.93 M/UL — SIGNIFICANT CHANGE UP (ref 4.2–5.8)
RBC # FLD: 17.5 % — HIGH (ref 10.3–14.5)
SODIUM SERPL-SCNC: 141 MMOL/L — SIGNIFICANT CHANGE UP (ref 135–145)
WBC # BLD: 5.84 K/UL — SIGNIFICANT CHANGE UP (ref 3.8–10.5)
WBC # FLD AUTO: 5.84 K/UL — SIGNIFICANT CHANGE UP (ref 3.8–10.5)

## 2019-10-04 PROCEDURE — 99024 POSTOP FOLLOW-UP VISIT: CPT

## 2019-10-04 PROCEDURE — 99239 HOSP IP/OBS DSCHRG MGMT >30: CPT

## 2019-10-04 RX ORDER — PIPERACILLIN AND TAZOBACTAM 4; .5 G/20ML; G/20ML
3.38 INJECTION, POWDER, LYOPHILIZED, FOR SOLUTION INTRAVENOUS EVERY 8 HOURS
Refills: 0 | Status: DISCONTINUED | OUTPATIENT
Start: 2019-10-04 | End: 2019-10-04

## 2019-10-04 RX ORDER — PIPERACILLIN AND TAZOBACTAM 4; .5 G/20ML; G/20ML
3.38 INJECTION, POWDER, LYOPHILIZED, FOR SOLUTION INTRAVENOUS ONCE
Refills: 0 | Status: COMPLETED | OUTPATIENT
Start: 2019-10-04 | End: 2019-10-04

## 2019-10-04 RX ORDER — DOCUSATE SODIUM 100 MG
1 CAPSULE ORAL
Qty: 0 | Refills: 0 | DISCHARGE
Start: 2019-10-04

## 2019-10-04 RX ORDER — WARFARIN SODIUM 2.5 MG/1
1 TABLET ORAL
Qty: 0 | Refills: 0 | DISCHARGE
Start: 2019-10-04

## 2019-10-04 RX ORDER — PIPERACILLIN AND TAZOBACTAM 4; .5 G/20ML; G/20ML
3.38 INJECTION, POWDER, LYOPHILIZED, FOR SOLUTION INTRAVENOUS
Qty: 0 | Refills: 0 | DISCHARGE
Start: 2019-10-04 | End: 2019-10-13

## 2019-10-04 RX ORDER — CARVEDILOL PHOSPHATE 80 MG/1
1 CAPSULE, EXTENDED RELEASE ORAL
Qty: 0 | Refills: 0 | DISCHARGE
Start: 2019-10-04

## 2019-10-04 RX ORDER — SENNA PLUS 8.6 MG/1
2 TABLET ORAL
Qty: 0 | Refills: 0 | DISCHARGE
Start: 2019-10-04

## 2019-10-04 RX ORDER — WARFARIN SODIUM 2.5 MG/1
7.5 TABLET ORAL ONCE
Refills: 0 | Status: DISCONTINUED | OUTPATIENT
Start: 2019-10-04 | End: 2019-10-04

## 2019-10-04 RX ADMIN — ESCITALOPRAM OXALATE 10 MILLIGRAM(S): 10 TABLET, FILM COATED ORAL at 11:11

## 2019-10-04 RX ADMIN — PIPERACILLIN AND TAZOBACTAM 200 GRAM(S): 4; .5 INJECTION, POWDER, LYOPHILIZED, FOR SOLUTION INTRAVENOUS at 09:36

## 2019-10-04 RX ADMIN — Medication 20 MILLIGRAM(S): at 05:56

## 2019-10-04 RX ADMIN — INFLUENZA VIRUS VACCINE 0.5 MILLILITER(S): 15; 15; 15; 15 SUSPENSION INTRAMUSCULAR at 13:43

## 2019-10-04 RX ADMIN — CARVEDILOL PHOSPHATE 3.12 MILLIGRAM(S): 80 CAPSULE, EXTENDED RELEASE ORAL at 05:56

## 2019-10-04 RX ADMIN — LACTULOSE 20 GRAM(S): 10 SOLUTION ORAL at 05:56

## 2019-10-04 RX ADMIN — Medication 1 PACKET(S): at 09:14

## 2019-10-04 RX ADMIN — Medication 100 MILLIGRAM(S): at 05:56

## 2019-10-04 RX ADMIN — Medication 100 MILLIGRAM(S): at 13:57

## 2019-10-04 RX ADMIN — MEMANTINE HYDROCHLORIDE 5 MILLIGRAM(S): 10 TABLET ORAL at 05:56

## 2019-10-04 NOTE — DISCHARGE NOTE NURSING/CASE MANAGEMENT/SOCIAL WORK - PATIENT PORTAL LINK FT
You can access the FollowMyHealth Patient Portal offered by [facility] by registering at the following website: [email or web address]. By joining [employer]’s FollowMyHealth portal, you will also be able to view your health information using other applications (apps) compatible with our system.

## 2019-10-04 NOTE — PROGRESS NOTE ADULT - SUBJECTIVE AND OBJECTIVE BOX
Postoperative Day # 1 s/p IR placement of cholecystostomy tube.    Patient seen and examined bedside resting comfortably.  Abdominal pain is well controlled.  Denies nausea and vomiting. Tolerating diet.  Flatus/BM. +  Denies chest pain, dyspnea, cough.    T(F): 97.2 (10-04-19 @ 05:09), Max: 98.8 (10-03-19 @ 23:33)  HR: 63 (10-04-19 @ 05:09) (63 - 82)  BP: 132/76 (10-04-19 @ 05:09) (122/85 - 145/89)  RR: 16 (10-04-19 @ 05:09) (16 - 18)  SpO2: 96% (10-04-19 @ 05:09) (96% - 97%)    PHYSICAL EXAM:  General: NAD  Neuro:  Alert & oriented x 3  Abdomen: soft, NTND. Normactive BS. Percutaneous cholecystostomy tube draining bilious material with small flecks of black material in it [fragments of gall stones?]    LABS:                        13.2   5.84  )-----------( 189      ( 04 Oct 2019 07:30 )             42.2     10-04    141  |  105  |  10  ----------------------------<  92  3.8   |  29  |  0.88    Ca    8.6      04 Oct 2019 07:30  Phos  2.9     10-04  Mg     2.7     10-04    TPro  7.3  /  Alb  3.0<L>  /  TBili  1.0  /  DBili  x   /  AST  26  /  ALT  46  /  AlkPhos  115  10-04    PT/INR - ( 04 Oct 2019 07:30 )   PT: 16.7 sec;   INR: 1.47 ratio           I&O's Detail    03 Oct 2019 07:01  -  04 Oct 2019 07:00  --------------------------------------------------------  IN:    Oral Fluid: 240 mL    Solution: 200 mL  Total IN: 440 mL    OUT:    Drain: 180 mL  Total OUT: 180 mL    Total NET: 260 mL          Impression: 89y Male admitted with ACUTE CHOLECYSTITIS  Postoperative Day # 1 s/p IR placement of cholecystostomy tube.  Dementia,   Depression,   bilateral DVT on coumadin,   HLD,   HTN    OA     Plan:  -pt cleared by medicine for transfer to NH for continued IV  - GI F/U  - IR f/u fin 2-3 weeks fo f/u tube check  - f/u with Dr. Rueda in 4-6 weeks to see if cholecystectomy is indicated at that time.

## 2019-10-04 NOTE — PROGRESS NOTE ADULT - PROVIDER SPECIALTY LIST ADULT
Hospitalist
Hospitalist
Intervent Radiology
Intervent Radiology
Surgery
Hospitalist
Hospitalist

## 2019-10-05 LAB
CULTURE RESULTS: SIGNIFICANT CHANGE UP
GRAM STN FLD: SIGNIFICANT CHANGE UP
ORGANISM # SPEC MICROSCOPIC CNT: SIGNIFICANT CHANGE UP
SPECIMEN SOURCE: SIGNIFICANT CHANGE UP

## 2019-10-08 DIAGNOSIS — Z79.01 LONG TERM (CURRENT) USE OF ANTICOAGULANTS: ICD-10-CM

## 2019-10-08 DIAGNOSIS — E83.39 OTHER DISORDERS OF PHOSPHORUS METABOLISM: ICD-10-CM

## 2019-10-08 DIAGNOSIS — E78.5 HYPERLIPIDEMIA, UNSPECIFIED: ICD-10-CM

## 2019-10-08 DIAGNOSIS — R60.0 LOCALIZED EDEMA: ICD-10-CM

## 2019-10-08 DIAGNOSIS — M19.90 UNSPECIFIED OSTEOARTHRITIS, UNSPECIFIED SITE: ICD-10-CM

## 2019-10-08 DIAGNOSIS — F03.90 UNSPECIFIED DEMENTIA WITHOUT BEHAVIORAL DISTURBANCE: ICD-10-CM

## 2019-10-08 DIAGNOSIS — I48.20 CHRONIC ATRIAL FIBRILLATION, UNSPECIFIED: ICD-10-CM

## 2019-10-08 DIAGNOSIS — K80.00 CALCULUS OF GALLBLADDER WITH ACUTE CHOLECYSTITIS WITHOUT OBSTRUCTION: ICD-10-CM

## 2019-10-08 DIAGNOSIS — Z95.828 PRESENCE OF OTHER VASCULAR IMPLANTS AND GRAFTS: ICD-10-CM

## 2019-10-08 DIAGNOSIS — F32.9 MAJOR DEPRESSIVE DISORDER, SINGLE EPISODE, UNSPECIFIED: ICD-10-CM

## 2019-10-08 DIAGNOSIS — B96.20 UNSPECIFIED ESCHERICHIA COLI [E. COLI] AS THE CAUSE OF DISEASES CLASSIFIED ELSEWHERE: ICD-10-CM

## 2019-10-08 DIAGNOSIS — Z86.718 PERSONAL HISTORY OF OTHER VENOUS THROMBOSIS AND EMBOLISM: ICD-10-CM

## 2019-10-08 DIAGNOSIS — I10 ESSENTIAL (PRIMARY) HYPERTENSION: ICD-10-CM

## 2019-10-08 DIAGNOSIS — B96.1 KLEBSIELLA PNEUMONIAE [K. PNEUMONIAE] AS THE CAUSE OF DISEASES CLASSIFIED ELSEWHERE: ICD-10-CM

## 2019-10-23 ENCOUNTER — OUTPATIENT (OUTPATIENT)
Dept: OUTPATIENT SERVICES | Facility: HOSPITAL | Age: 84
LOS: 1 days | Discharge: HOME | End: 2019-10-23
Payer: MEDICARE

## 2019-10-23 ENCOUNTER — APPOINTMENT (OUTPATIENT)
Dept: INTERVENTIONAL RADIOLOGY/VASCULAR | Facility: HOSPITAL | Age: 84
End: 2019-10-23

## 2019-10-23 DIAGNOSIS — Z43.1 ENCOUNTER FOR ATTENTION TO GASTROSTOMY: ICD-10-CM

## 2019-10-23 DIAGNOSIS — Z98.89 OTHER SPECIFIED POSTPROCEDURAL STATES: Chronic | ICD-10-CM

## 2019-10-23 PROCEDURE — 47531 INJECTION FOR CHOLANGIOGRAM: CPT

## 2019-10-30 LAB
CULTURE RESULTS: SIGNIFICANT CHANGE UP
SPECIMEN SOURCE: SIGNIFICANT CHANGE UP

## 2019-12-03 ENCOUNTER — EMERGENCY (EMERGENCY)
Facility: HOSPITAL | Age: 84
LOS: 0 days | Discharge: HOME | End: 2019-12-03
Attending: EMERGENCY MEDICINE
Payer: MEDICARE

## 2019-12-03 ENCOUNTER — APPOINTMENT (OUTPATIENT)
Dept: SURGERY | Facility: CLINIC | Age: 84
End: 2019-12-03

## 2019-12-03 VITALS
SYSTOLIC BLOOD PRESSURE: 124 MMHG | OXYGEN SATURATION: 96 % | DIASTOLIC BLOOD PRESSURE: 66 MMHG | HEART RATE: 74 BPM | RESPIRATION RATE: 18 BRPM | TEMPERATURE: 98 F

## 2019-12-03 VITALS
RESPIRATION RATE: 18 BRPM | HEART RATE: 70 BPM | DIASTOLIC BLOOD PRESSURE: 79 MMHG | SYSTOLIC BLOOD PRESSURE: 125 MMHG | OXYGEN SATURATION: 97 % | TEMPERATURE: 98 F

## 2019-12-03 DIAGNOSIS — T85.518A BREAKDOWN (MECHANICAL) OF OTHER GASTROINTESTINAL PROSTHETIC DEVICES, IMPLANTS AND GRAFTS, INITIAL ENCOUNTER: ICD-10-CM

## 2019-12-03 DIAGNOSIS — F03.90 UNSPECIFIED DEMENTIA WITHOUT BEHAVIORAL DISTURBANCE: ICD-10-CM

## 2019-12-03 DIAGNOSIS — Z79.899 OTHER LONG TERM (CURRENT) DRUG THERAPY: ICD-10-CM

## 2019-12-03 DIAGNOSIS — Z86.718 PERSONAL HISTORY OF OTHER VENOUS THROMBOSIS AND EMBOLISM: ICD-10-CM

## 2019-12-03 DIAGNOSIS — M19.90 UNSPECIFIED OSTEOARTHRITIS, UNSPECIFIED SITE: ICD-10-CM

## 2019-12-03 DIAGNOSIS — E78.5 HYPERLIPIDEMIA, UNSPECIFIED: ICD-10-CM

## 2019-12-03 DIAGNOSIS — I10 ESSENTIAL (PRIMARY) HYPERTENSION: ICD-10-CM

## 2019-12-03 DIAGNOSIS — Z98.89 OTHER SPECIFIED POSTPROCEDURAL STATES: Chronic | ICD-10-CM
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
## 2019-12-03 DIAGNOSIS — F32.9 MAJOR DEPRESSIVE DISORDER, SINGLE EPISODE, UNSPECIFIED: ICD-10-CM

## 2019-12-03 DIAGNOSIS — Z79.02 LONG TERM (CURRENT) USE OF ANTITHROMBOTICS/ANTIPLATELETS: ICD-10-CM

## 2019-12-03 LAB
ALBUMIN SERPL ELPH-MCNC: 3.6 G/DL — SIGNIFICANT CHANGE UP (ref 3.3–5)
ALP SERPL-CCNC: 235 U/L — HIGH (ref 40–120)
ALT FLD-CCNC: 58 U/L — SIGNIFICANT CHANGE UP (ref 12–78)
ANION GAP SERPL CALC-SCNC: 7 MMOL/L — SIGNIFICANT CHANGE UP (ref 5–17)
APPEARANCE UR: CLEAR — SIGNIFICANT CHANGE UP
APTT BLD: 39.5 SEC — HIGH (ref 27.5–36.3)
AST SERPL-CCNC: 31 U/L — SIGNIFICANT CHANGE UP (ref 15–37)
BACTERIA # UR AUTO: ABNORMAL
BASOPHILS # BLD AUTO: 0.03 K/UL — SIGNIFICANT CHANGE UP (ref 0–0.2)
BASOPHILS NFR BLD AUTO: 0.6 % — SIGNIFICANT CHANGE UP (ref 0–2)
BILIRUB SERPL-MCNC: 0.7 MG/DL — SIGNIFICANT CHANGE UP (ref 0.2–1.2)
BILIRUB UR-MCNC: NEGATIVE — SIGNIFICANT CHANGE UP
BLD GP AB SCN SERPL QL: SIGNIFICANT CHANGE UP
BUN SERPL-MCNC: 10 MG/DL — SIGNIFICANT CHANGE UP (ref 7–23)
CALCIUM SERPL-MCNC: 9.2 MG/DL — SIGNIFICANT CHANGE UP (ref 8.5–10.1)
CHLORIDE SERPL-SCNC: 106 MMOL/L — SIGNIFICANT CHANGE UP (ref 96–108)
CO2 SERPL-SCNC: 28 MMOL/L — SIGNIFICANT CHANGE UP (ref 22–31)
COLOR SPEC: YELLOW — SIGNIFICANT CHANGE UP
CREAT SERPL-MCNC: 1.03 MG/DL — SIGNIFICANT CHANGE UP (ref 0.5–1.3)
DIFF PNL FLD: ABNORMAL
EOSINOPHIL # BLD AUTO: 0.4 K/UL — SIGNIFICANT CHANGE UP (ref 0–0.5)
EOSINOPHIL NFR BLD AUTO: 8.2 % — HIGH (ref 0–6)
EPI CELLS # UR: SIGNIFICANT CHANGE UP
GLUCOSE SERPL-MCNC: 85 MG/DL — SIGNIFICANT CHANGE UP (ref 70–99)
GLUCOSE UR QL: NEGATIVE MG/DL — SIGNIFICANT CHANGE UP
HCT VFR BLD CALC: 44.9 % — SIGNIFICANT CHANGE UP (ref 39–50)
HGB BLD-MCNC: 13.9 G/DL — SIGNIFICANT CHANGE UP (ref 13–17)
IMM GRANULOCYTES NFR BLD AUTO: 1 % — SIGNIFICANT CHANGE UP (ref 0–1.5)
INR BLD: 1.83 RATIO — HIGH (ref 0.88–1.16)
KETONES UR-MCNC: NEGATIVE — SIGNIFICANT CHANGE UP
LEUKOCYTE ESTERASE UR-ACNC: NEGATIVE — SIGNIFICANT CHANGE UP
LIDOCAIN IGE QN: 74 U/L — SIGNIFICANT CHANGE UP (ref 73–393)
LYMPHOCYTES # BLD AUTO: 2.07 K/UL — SIGNIFICANT CHANGE UP (ref 1–3.3)
LYMPHOCYTES # BLD AUTO: 42.2 % — SIGNIFICANT CHANGE UP (ref 13–44)
MCHC RBC-ENTMCNC: 26.8 PG — LOW (ref 27–34)
MCHC RBC-ENTMCNC: 31 GM/DL — LOW (ref 32–36)
MCV RBC AUTO: 86.5 FL — SIGNIFICANT CHANGE UP (ref 80–100)
MONOCYTES # BLD AUTO: 0.46 K/UL — SIGNIFICANT CHANGE UP (ref 0–0.9)
MONOCYTES NFR BLD AUTO: 9.4 % — SIGNIFICANT CHANGE UP (ref 2–14)
NEUTROPHILS # BLD AUTO: 1.89 K/UL — SIGNIFICANT CHANGE UP (ref 1.8–7.4)
NEUTROPHILS NFR BLD AUTO: 38.6 % — LOW (ref 43–77)
NITRITE UR-MCNC: NEGATIVE — SIGNIFICANT CHANGE UP
NRBC # BLD: 0 /100 WBCS — SIGNIFICANT CHANGE UP (ref 0–0)
PH UR: 7 — SIGNIFICANT CHANGE UP (ref 5–8)
PLATELET # BLD AUTO: 234 K/UL — SIGNIFICANT CHANGE UP (ref 150–400)
POTASSIUM SERPL-MCNC: 4.2 MMOL/L — SIGNIFICANT CHANGE UP (ref 3.5–5.3)
POTASSIUM SERPL-SCNC: 4.2 MMOL/L — SIGNIFICANT CHANGE UP (ref 3.5–5.3)
PROT SERPL-MCNC: 9.2 GM/DL — HIGH (ref 6–8.3)
PROT UR-MCNC: NEGATIVE MG/DL — SIGNIFICANT CHANGE UP
PROTHROM AB SERPL-ACNC: 20.9 SEC — HIGH (ref 10–12.9)
RBC # BLD: 5.19 M/UL — SIGNIFICANT CHANGE UP (ref 4.2–5.8)
RBC # FLD: 15.2 % — HIGH (ref 10.3–14.5)
RBC CASTS # UR COMP ASSIST: SIGNIFICANT CHANGE UP /HPF (ref 0–4)
SODIUM SERPL-SCNC: 141 MMOL/L — SIGNIFICANT CHANGE UP (ref 135–145)
SP GR SPEC: 1 — LOW (ref 1.01–1.02)
UROBILINOGEN FLD QL: NEGATIVE MG/DL — SIGNIFICANT CHANGE UP
WBC # BLD: 4.9 K/UL — SIGNIFICANT CHANGE UP (ref 3.8–10.5)
WBC # FLD AUTO: 4.9 K/UL — SIGNIFICANT CHANGE UP (ref 3.8–10.5)
WBC UR QL: SIGNIFICANT CHANGE UP

## 2019-12-03 PROCEDURE — 76700 US EXAM ABDOM COMPLETE: CPT | Mod: 26

## 2019-12-03 PROCEDURE — 99284 EMERGENCY DEPT VISIT MOD MDM: CPT

## 2019-12-03 PROCEDURE — 99218: CPT

## 2019-12-03 RX ORDER — OLANZAPINE 15 MG/1
5 TABLET, FILM COATED ORAL ONCE
Refills: 0 | Status: COMPLETED | OUTPATIENT
Start: 2019-12-03 | End: 2019-12-03

## 2019-12-03 RX ADMIN — OLANZAPINE 5 MILLIGRAM(S): 15 TABLET, FILM COATED ORAL at 14:35

## 2019-12-03 NOTE — ED PROVIDER NOTE - OBJECTIVE STATEMENT
Pt is a 89 yo gentleman with a pmhx of HTn, HL, DVT, dementia who presents to the ED after he pulled out his cholecystostomy tube. Was diagnosed with cholecystitis and IR inserted kinga tube in October. Pt pulled tube out on Sunday. No complaints of pain, no fevers, no n/v/d.

## 2019-12-03 NOTE — ED ADULT TRIAGE NOTE - CHIEF COMPLAINT QUOTE
as per family member pt had gall bladder surgery on october 28 and a drain was placed. pt pulled drain out on Sunday. family wants pt checked out. history of dementia.

## 2019-12-03 NOTE — ED ADULT NURSE NOTE - HOW OFTEN DO YOU HAVE A DRINK CONTAINING ALCOHOL?
Immunohistochemistry ([id number] and [id number]) billing is not performed here. Please use the Immunohistochemistry Stain Billing plan to accomplish this. Immunohistochemistry ([id number] and [id number]) billing is not performed here. Please use the Immunohistochemistry Stain Billing plan to accomplish this. Never

## 2019-12-03 NOTE — ED ADULT NURSE REASSESSMENT NOTE - NS ED NURSE REASSESS COMMENT FT1
Pt returned from CT aggressive aide not at bedside Cami barragan called and pt medicated aide now at bedside calming pt

## 2019-12-03 NOTE — CONSULT NOTE ADULT - ATTENDING COMMENTS
I saw and examined the patient and agree with the findings and assessment and plan. Denies pain in RUQ at this time. No imaging findings concerning for leak. No evidence of Soriano's Sign. No acute surgical intervention at this present time.

## 2019-12-03 NOTE — CONSULT NOTE ADULT - SUBJECTIVE AND OBJECTIVE BOX
GENERAL SURGERY CONSULT NOTE    Patient is a 89y old  Male who presents with a chief complaint of wound check s/p pulling out cholecystomy tube.    HPI: Patient seen and examined in ER with Dr. Rueda. Precious at bedside.   89yo Male with a pmhx of HTn, HL, DVT, dementia who presents to the ED after he pulled out his cholecystostomy tube. Was diagnosed with cholecystitis and IR inserted kinga tube in October, patient was scheduled to follow up with Dr. Rueda in the office today. Pt pulled tube out on . Aide at bedside states they did not notice until today the tube was pulled out. Patient offers no complaints of abdominal pain, fevers, or n/v/d.      PAST MEDICAL & SURGICAL HISTORY:  Cholelithiasis  OA (osteoarthritis)  HLD (hyperlipidemia)  Dementia  Depression  Hypertension  DVT (deep venous thrombosis): bilateral, on coumadin  S/P IVC filter      Review of Systems:  Unable to obtain full ROS due to h/o dementia     MEDICATIONS  (STANDING):    Allergies    No Known Allergies    SOCIAL HISTORY   Unable to obtain    FAMILY HISTORY:  No pertinent family history      Vital Signs Last 24 Hrs  T(C): 36.6 (03 Dec 2019 15:21), Max: 36.6 (03 Dec 2019 15:21)  T(F): 97.8 (03 Dec 2019 15:21), Max: 97.8 (03 Dec 2019 15:21)  HR: 70 (03 Dec 2019 15:21) (70 - 74)  BP: 125/79 (03 Dec 2019 15:21) (124/66 - 125/79)  RR: 18 (03 Dec 2019 15:21) (18 - 18)  SpO2: 97% (03 Dec 2019 15:21) (96% - 97%)    Physical Exam:    General:  Appears stated age, well-groomed, well-nourished, no distress  Eyes : SAIGE  HENT:  WNL, no JVD  Chest:  clear breath sounds  Cardiovascular:  Regular rate & rhythm  Abdomen: Nondistended, +BS, soft, nontender, no guarding    Extremities: No pedal edema  Skin:  No rash  Musculoskeletal:  normal strength  Neuro/Psych: Alert and awake.      LABS:                        13.9   4.90  )-----------( 234      ( 03 Dec 2019 11:35 )             44.9     12-03    141  |  106  |  10  ----------------------------<  85  4.2   |  28  |  1.03    Ca    9.2      03 Dec 2019 12:33    TPro  9.2<H>  /  Alb  3.6  /  TBili  0.7  /  DBili  x   /  AST  31  /  ALT  58  /  AlkPhos  235<H>  12-03    PT/INR - ( 03 Dec 2019 11:35 )   PT: 20.9 sec;   INR: 1.83 ratio         PTT - ( 03 Dec 2019 11:35 )  PTT:39.5 sec  Urinalysis Basic - ( 03 Dec 2019 12:27 )    Color: Yellow / Appearance: Clear / S.005 / pH: x  Gluc: x / Ketone: Negative  / Bili: Negative / Urobili: Negative mg/dL   Blood: x / Protein: Negative mg/dL / Nitrite: Negative   Leuk Esterase: Negative / RBC: 0-2 /HPF / WBC 0-2   Sq Epi: x / Non Sq Epi: Occasional / Bacteria: Occasional    RADIOLOGY & ADDITIONAL STUDIES:   < from: US Abdomen Complete (19 @ 14:12) >  EXAM:  US ABDOMINAL COMPLETE                            PROCEDURE DATE:  2019          INTERPRETATION:    Date of study: 12/3/2019.    CLINICAL INFORMATION: 89-yo-male - pulled out cholecystostomy tube.   Patient being treated for cholecystitis.    COMPARISON:  19 abdominal sonogram and CT scan of the abdomen and   pelvis pelvis.    TECHNIQUE: Sonography of the abdomen.     FINDINGS:    Liver: Within normal limits.    Bile ducts: Normal caliber. Common bile duct measures 5.4mm.     Gallbladder: Gallbladder wall is thickened (0.33cm). There are calcific   gallstones. Negative for sonographic Soriano's sign.    Pancreas: The pancreas is obscured by overlying bowel gas.    Spleen: Poor visualization of spleen - patient not able to turn. Spleen   is 7.3cm in greatest sagittal diameter.    Right kidney: 10.5cm. No hydronephrosis. There is upper pole cyst   measuring 4.0cm.     Left kidney: 10.2cm.  No hydronephrosis.     Ascites: None.    Aorta and IVC: Visualized portions are within normal limits.    IMPRESSION:   1. Limited study as above.  2. Liver within normal limits.  3. Calcific gallstones and gallbladder wall thickened present. Negative   for sonographic Soriano's sign.        LEIGH ANN ANJEL M.D., ATTENDING RADIOLOGIST  This document has been electronically signed. Dec  3 2019  2:26PM    < end of copied text >

## 2019-12-03 NOTE — ED PROVIDER NOTE - PROGRESS NOTE DETAILS
Pt labs wnl, has slight elevated alk phos. US shows some dilated GB wall. Pt surgeon Dr. Rueda was scheduled to see patient for f/u anyway. Dr. Rueda evaluated pt in ED, findings due to cholecystomy tube, pt is cleared for d/c to fu outpatient. Pt is ready for d/c with family.

## 2019-12-03 NOTE — ED ADULT NURSE NOTE - OBJECTIVE STATEMENT
as per family member pt had gall bladder surgery on october 28 and a drain was placed. pt pulled drain out on Sunday. family wants pt checked out.  PMH Dementia. unable to obtain more history

## 2019-12-03 NOTE — ED PROVIDER NOTE - PATIENT PORTAL LINK FT
You can access the FollowMyHealth Patient Portal offered by [facility] by registering at the following website: [email or web address]. By joining [employer]’s FollowMyHealth portal, you will also be able to view your health information using other applications (apps) compatible with our system.

## 2019-12-03 NOTE — CONSULT NOTE ADULT - ASSESSMENT
A/P: 89M with multiple medical comorbidities and h/o Acute cholecystitis s/p IR percutaneous cholecystostomy tube placement in October 2019 presents to the ER after pulling out cholecystostomy tube Sunday. Patient asymptomatic with no complaints of abdominal pain. No Leukocytosis, no fevers. Hemodynamically stable.    -As per Dr. Rueda, patient is surgically stable for discharge home. No acute surgical intervention. Patient may follow up in the office with Dr. Rueda if any new or worsening symptoms.   Discussed with ER attending Dr. Nicholson and patient

## 2019-12-03 NOTE — ED PROVIDER NOTE - CLINICAL SUMMARY MEDICAL DECISION MAKING FREE TEXT BOX
Ddx: Assess if pt still has cholecystitis, if kinga tube necessary/ ir replacement of tube  Plan: Cbc, cmp, lipase, US abd, reassess

## 2019-12-04 LAB
CULTURE RESULTS: SIGNIFICANT CHANGE UP
SPECIMEN SOURCE: SIGNIFICANT CHANGE UP

## 2019-12-08 LAB
CULTURE RESULTS: SIGNIFICANT CHANGE UP
CULTURE RESULTS: SIGNIFICANT CHANGE UP
SPECIMEN SOURCE: SIGNIFICANT CHANGE UP
SPECIMEN SOURCE: SIGNIFICANT CHANGE UP

## 2020-01-04 ENCOUNTER — EMERGENCY (EMERGENCY)
Facility: HOSPITAL | Age: 85
LOS: 0 days | Discharge: HOME | End: 2020-01-05
Attending: EMERGENCY MEDICINE
Payer: MEDICARE

## 2020-01-04 VITALS
SYSTOLIC BLOOD PRESSURE: 144 MMHG | DIASTOLIC BLOOD PRESSURE: 71 MMHG | TEMPERATURE: 98 F | WEIGHT: 179.9 LBS | OXYGEN SATURATION: 99 % | RESPIRATION RATE: 19 BRPM | HEART RATE: 85 BPM

## 2020-01-04 DIAGNOSIS — Z98.89 OTHER SPECIFIED POSTPROCEDURAL STATES: Chronic | ICD-10-CM

## 2020-01-04 DIAGNOSIS — Z79.01 LONG TERM (CURRENT) USE OF ANTICOAGULANTS: ICD-10-CM

## 2020-01-04 DIAGNOSIS — F03.90 UNSPECIFIED DEMENTIA WITHOUT BEHAVIORAL DISTURBANCE: ICD-10-CM

## 2020-01-04 DIAGNOSIS — Z86.718 PERSONAL HISTORY OF OTHER VENOUS THROMBOSIS AND EMBOLISM: ICD-10-CM

## 2020-01-04 DIAGNOSIS — I10 ESSENTIAL (PRIMARY) HYPERTENSION: ICD-10-CM

## 2020-01-04 DIAGNOSIS — F32.9 MAJOR DEPRESSIVE DISORDER, SINGLE EPISODE, UNSPECIFIED: ICD-10-CM

## 2020-01-04 DIAGNOSIS — R05 COUGH: ICD-10-CM

## 2020-01-04 DIAGNOSIS — M19.90 UNSPECIFIED OSTEOARTHRITIS, UNSPECIFIED SITE: ICD-10-CM

## 2020-01-04 DIAGNOSIS — E78.5 HYPERLIPIDEMIA, UNSPECIFIED: ICD-10-CM

## 2020-01-04 DIAGNOSIS — R63.0 ANOREXIA: ICD-10-CM

## 2020-01-04 DIAGNOSIS — R06.2 WHEEZING: ICD-10-CM

## 2020-01-04 LAB
ALBUMIN SERPL ELPH-MCNC: 3.1 G/DL — LOW (ref 3.3–5)
ALP SERPL-CCNC: 92 U/L — SIGNIFICANT CHANGE UP (ref 40–120)
ALT FLD-CCNC: 14 U/L — SIGNIFICANT CHANGE UP (ref 12–78)
ANION GAP SERPL CALC-SCNC: 4 MMOL/L — LOW (ref 5–17)
APTT BLD: 41.6 SEC — HIGH (ref 27.5–36.3)
AST SERPL-CCNC: 17 U/L — SIGNIFICANT CHANGE UP (ref 15–37)
BASOPHILS # BLD AUTO: 0.04 K/UL — SIGNIFICANT CHANGE UP (ref 0–0.2)
BASOPHILS NFR BLD AUTO: 0.6 % — SIGNIFICANT CHANGE UP (ref 0–2)
BILIRUB SERPL-MCNC: 0.6 MG/DL — SIGNIFICANT CHANGE UP (ref 0.2–1.2)
BUN SERPL-MCNC: 15 MG/DL — SIGNIFICANT CHANGE UP (ref 7–23)
CALCIUM SERPL-MCNC: 8.6 MG/DL — SIGNIFICANT CHANGE UP (ref 8.5–10.1)
CHLORIDE SERPL-SCNC: 106 MMOL/L — SIGNIFICANT CHANGE UP (ref 96–108)
CO2 SERPL-SCNC: 28 MMOL/L — SIGNIFICANT CHANGE UP (ref 22–31)
CREAT SERPL-MCNC: 1.17 MG/DL — SIGNIFICANT CHANGE UP (ref 0.5–1.3)
EOSINOPHIL # BLD AUTO: 0.49 K/UL — SIGNIFICANT CHANGE UP (ref 0–0.5)
EOSINOPHIL NFR BLD AUTO: 7.6 % — HIGH (ref 0–6)
FLU A RESULT: SIGNIFICANT CHANGE UP
FLU A RESULT: SIGNIFICANT CHANGE UP
FLUAV AG NPH QL: SIGNIFICANT CHANGE UP
FLUBV AG NPH QL: SIGNIFICANT CHANGE UP
GLUCOSE SERPL-MCNC: 147 MG/DL — HIGH (ref 70–99)
HCT VFR BLD CALC: 41.1 % — SIGNIFICANT CHANGE UP (ref 39–50)
HGB BLD-MCNC: 13 G/DL — SIGNIFICANT CHANGE UP (ref 13–17)
IMM GRANULOCYTES NFR BLD AUTO: 0.3 % — SIGNIFICANT CHANGE UP (ref 0–1.5)
INR BLD: 2.85 RATIO — HIGH (ref 0.88–1.16)
LYMPHOCYTES # BLD AUTO: 1.97 K/UL — SIGNIFICANT CHANGE UP (ref 1–3.3)
LYMPHOCYTES # BLD AUTO: 30.4 % — SIGNIFICANT CHANGE UP (ref 13–44)
MCHC RBC-ENTMCNC: 26.8 PG — LOW (ref 27–34)
MCHC RBC-ENTMCNC: 31.6 GM/DL — LOW (ref 32–36)
MCV RBC AUTO: 84.7 FL — SIGNIFICANT CHANGE UP (ref 80–100)
MONOCYTES # BLD AUTO: 0.65 K/UL — SIGNIFICANT CHANGE UP (ref 0–0.9)
MONOCYTES NFR BLD AUTO: 10 % — SIGNIFICANT CHANGE UP (ref 2–14)
NEUTROPHILS # BLD AUTO: 3.3 K/UL — SIGNIFICANT CHANGE UP (ref 1.8–7.4)
NEUTROPHILS NFR BLD AUTO: 51.1 % — SIGNIFICANT CHANGE UP (ref 43–77)
NRBC # BLD: 0 /100 WBCS — SIGNIFICANT CHANGE UP (ref 0–0)
NT-PROBNP SERPL-SCNC: 111 PG/ML — SIGNIFICANT CHANGE UP (ref 0–450)
PLATELET # BLD AUTO: 197 K/UL — SIGNIFICANT CHANGE UP (ref 150–400)
POTASSIUM SERPL-MCNC: 4.4 MMOL/L — SIGNIFICANT CHANGE UP (ref 3.5–5.3)
POTASSIUM SERPL-SCNC: 4.4 MMOL/L — SIGNIFICANT CHANGE UP (ref 3.5–5.3)
PROT SERPL-MCNC: 7.4 GM/DL — SIGNIFICANT CHANGE UP (ref 6–8.3)
PROTHROM AB SERPL-ACNC: 33 SEC — HIGH (ref 10–12.9)
RBC # BLD: 4.85 M/UL — SIGNIFICANT CHANGE UP (ref 4.2–5.8)
RBC # FLD: 15.3 % — HIGH (ref 10.3–14.5)
RSV RESULT: SIGNIFICANT CHANGE UP
RSV RNA RESP QL NAA+PROBE: SIGNIFICANT CHANGE UP
SODIUM SERPL-SCNC: 138 MMOL/L — SIGNIFICANT CHANGE UP (ref 135–145)
TROPONIN I SERPL-MCNC: <.015 NG/ML — SIGNIFICANT CHANGE UP (ref 0.01–0.04)
WBC # BLD: 6.47 K/UL — SIGNIFICANT CHANGE UP (ref 3.8–10.5)
WBC # FLD AUTO: 6.47 K/UL — SIGNIFICANT CHANGE UP (ref 3.8–10.5)

## 2020-01-04 PROCEDURE — 99285 EMERGENCY DEPT VISIT HI MDM: CPT

## 2020-01-04 PROCEDURE — 71045 X-RAY EXAM CHEST 1 VIEW: CPT | Mod: 26

## 2020-01-04 PROCEDURE — 93010 ELECTROCARDIOGRAM REPORT: CPT

## 2020-01-04 RX ORDER — SODIUM CHLORIDE 9 MG/ML
1000 INJECTION INTRAMUSCULAR; INTRAVENOUS; SUBCUTANEOUS ONCE
Refills: 0 | Status: COMPLETED | OUTPATIENT
Start: 2020-01-04 | End: 2020-01-04

## 2020-01-04 RX ADMIN — SODIUM CHLORIDE 1000 MILLILITER(S): 9 INJECTION INTRAMUSCULAR; INTRAVENOUS; SUBCUTANEOUS at 20:00

## 2020-01-04 RX ADMIN — Medication 100 MILLIGRAM(S): at 21:55

## 2020-01-04 NOTE — ED PROVIDER NOTE - EKG ADDITIONAL INFORMATION FREE TEXT
sinus rhythm, rate 65, 1st degree block, qtc 465, no acute st e/d sinus rhythm, rate 65, 1st degree block, qtc 465, no acute st e/d  no changes from prior

## 2020-01-04 NOTE — ED PROVIDER NOTE - NS ED ROS FT
No fever/chills, No photophobia/eye pain/changes in vision, No ear pain/sore throat/dysphagia, No chest pain/palpitations,  + SOB/cough/wheeze, no stridor, No abdominal pain, No N/V/D, no dysuria/frequency/discharge, No neck/back pain, no rash, no changes in neurological status/function. + generalized weakness, + decreased appetite.

## 2020-01-04 NOTE — ED PROVIDER NOTE - PATIENT PORTAL LINK FT
You can access the FollowMyHealth Patient Portal offered by [facility] by registering at the following website: [email or web address]. By joining [employer]’s FollowMyHealth portal, you will also be able to view your health information using other applications (apps) compatible with our system.

## 2020-01-04 NOTE — ED PROVIDER NOTE - CLINICAL SUMMARY MEDICAL DECISION MAKING FREE TEXT BOX
Patient Patient brought in by family for cough, well appearing, nontoxic.  VSS.  Lab values reviewed, there are no values which require acute intervention.  CT negative for PE or pneumonia, patient has had no respiratory distress in ER at any point.  Will treat with antibiotic as patient said to have prolonged cough.  No cardiac source appreciated for symptoms.  Discussed results and outcome of today's visit with the patient's family.  Patient advised to please follow up with another healthcare provider within the next 24 hours and return to the Emergency Department for worsening symptoms or any other concerns.  Patient advised that their doctor may call  to follow up on the specific results of the tests performed today in the emergency department.   Patient appears well on discharge.

## 2020-01-04 NOTE — ED PROVIDER NOTE - OBJECTIVE STATEMENT
89 y/o M with pmhxh of DVT, Cholelithiasis, HTN, HLD, OA, and dementia presents to ED with bad cold x1 week that has gradually worsened Pt reports wheezing, loss of appetite, productive cough, and generalized weakness. Pt denies vomiting, fevers, or recent travel. Pt has never experienced this before. 89 y/o M with pmhxh of DVT (no longer on coumadin), Cholelithiasis, HTN, HLD, OA, and dementia presents to ED with cough x1 week that has gradually worsened.  Pt family at bedside reports wheezing, loss of appetite, productive cough, and generalized weakness. Pt denies vomiting, fevers, or recent travel. Patient denies EtOH/tobacco/illicit substance use. 89 y/o M with pmhxh of DVT (no longer on coumadin), Cholelithiasis, HTN, HLD, OA, and dementia presents to ED with cough x1 week that has gradually worsened.  Pt family at bedside reports mild loss of appetite, productive cough, occasional wheezing, and generalized weakness. Pt denies vomiting, fevers, or recent travel. Patient denies EtOH/tobacco/illicit substance use. 89 y/o M with pmhxh of DVT (no longer on coumadin), Cholelithiasis, HTN, HLD, OA, and dementia presents to ED with cough x1 week that has gradually worsened.  Pt family at bedside reports mild loss of appetite, productive cough, occasional wheezing, and generalized weakness and body aches in back and legs. Pt denies vomiting, fevers, or recent travel. Patient denies EtOH/tobacco/illicit substance use.

## 2020-01-04 NOTE — ED ADULT NURSE NOTE - ED STAT RN HANDOFF DETAILS
Report received from CHARLENE Robbins at 12am. Assessment available on [facility]. will continue to monitor

## 2020-01-04 NOTE — ED ADULT NURSE NOTE - ED STAT RN HANDOFF DETAILS 2
Report endorsed to oncoming RN Hedy. Safety checks compld this shift/Safety rounds completed hourly.  IV sites checked Q2+remains WDL. Fall +skin precs in place. Any issues endorsed to oncoming RN for follow up. awaiting family pickup

## 2020-01-04 NOTE — ED ADULT TRIAGE NOTE - CHIEF COMPLAINT QUOTE
patient BIBA as per EMS patient c/o of cough for 1 week , patient has dementia , patient A&Ox2  , c/o of back pain and lower legs pain , denied chest pain denied difficulty breathing at the time of triage

## 2020-01-04 NOTE — ED PROVIDER NOTE - PHYSICAL EXAMINATION
Gen: Alert, NAD, well appearing, coughing  Head: NC, AT, PERRL, EOMI, normal lids/conjunctiva  ENT: normal hearing, patent oropharynx without erythema/exudate, uvula midline  Neck: +supple, no tenderness/meningismus/JVD, +Trachea midline  Pulm: Bilateral BS, normal resp effort, +mild BL wheezes, no stridor/retractions  CV: RRR, no M/R/G, +dist pulses  Abd: soft, NT/ND, Negative [place] signs, +BS, no palpable masses  Mskel: no edema/erythema/cyanosis  Skin: no rash, warm/dry  Neuro: AAOx3, no apparent sensory/motor deficits, coordination intact

## 2020-01-05 VITALS
SYSTOLIC BLOOD PRESSURE: 141 MMHG | OXYGEN SATURATION: 95 % | RESPIRATION RATE: 17 BRPM | TEMPERATURE: 98 F | HEART RATE: 86 BPM | DIASTOLIC BLOOD PRESSURE: 72 MMHG

## 2020-01-05 LAB
APPEARANCE UR: CLEAR — SIGNIFICANT CHANGE UP
BILIRUB UR-MCNC: NEGATIVE — SIGNIFICANT CHANGE UP
COLOR SPEC: YELLOW — SIGNIFICANT CHANGE UP
DIFF PNL FLD: ABNORMAL
GLUCOSE UR QL: NEGATIVE MG/DL — SIGNIFICANT CHANGE UP
KETONES UR-MCNC: NEGATIVE — SIGNIFICANT CHANGE UP
LEUKOCYTE ESTERASE UR-ACNC: NEGATIVE — SIGNIFICANT CHANGE UP
NITRITE UR-MCNC: NEGATIVE — SIGNIFICANT CHANGE UP
PH UR: 5 — SIGNIFICANT CHANGE UP (ref 5–8)
PROT UR-MCNC: 15 MG/DL
RBC CASTS # UR COMP ASSIST: ABNORMAL /HPF (ref 0–4)
SP GR SPEC: 1.01 — SIGNIFICANT CHANGE UP (ref 1.01–1.02)
UROBILINOGEN FLD QL: NEGATIVE MG/DL — SIGNIFICANT CHANGE UP
WBC UR QL: SIGNIFICANT CHANGE UP

## 2020-01-05 PROCEDURE — 71275 CT ANGIOGRAPHY CHEST: CPT | Mod: 26

## 2020-01-05 NOTE — ED ADULT NURSE REASSESSMENT NOTE - NS ED NURSE REASSESS COMMENT FT1
patient A&Ox3 in no acute distress discharge as orders heplock remove patient left ER self ambulated with daughter on his side

## 2020-01-05 NOTE — ED ADULT NURSE REASSESSMENT NOTE - NS ED NURSE REASSESS COMMENT FT1
patient A&Ox3 in no acute distress discharge as orders no complain at this time , misael remove , awaiting for family to pick him up

## 2020-01-06 LAB
CULTURE RESULTS: SIGNIFICANT CHANGE UP
SPECIMEN SOURCE: SIGNIFICANT CHANGE UP

## 2020-09-20 ENCOUNTER — EMERGENCY (EMERGENCY)
Facility: HOSPITAL | Age: 85
LOS: 0 days | Discharge: HOME | End: 2020-09-21
Attending: STUDENT IN AN ORGANIZED HEALTH CARE EDUCATION/TRAINING PROGRAM
Payer: MEDICARE

## 2020-09-20 VITALS
DIASTOLIC BLOOD PRESSURE: 71 MMHG | HEART RATE: 63 BPM | SYSTOLIC BLOOD PRESSURE: 150 MMHG | HEIGHT: 78 IN | WEIGHT: 220.02 LBS | OXYGEN SATURATION: 97 % | RESPIRATION RATE: 16 BRPM | TEMPERATURE: 98 F

## 2020-09-20 DIAGNOSIS — S91.112A LACERATION WITHOUT FOREIGN BODY OF LEFT GREAT TOE WITHOUT DAMAGE TO NAIL, INITIAL ENCOUNTER: ICD-10-CM

## 2020-09-20 DIAGNOSIS — R10.9 UNSPECIFIED ABDOMINAL PAIN: ICD-10-CM

## 2020-09-20 DIAGNOSIS — Z98.89 OTHER SPECIFIED POSTPROCEDURAL STATES: Chronic | ICD-10-CM

## 2020-09-20 DIAGNOSIS — Z79.01 LONG TERM (CURRENT) USE OF ANTICOAGULANTS: ICD-10-CM

## 2020-09-20 DIAGNOSIS — Z95.828 PRESENCE OF OTHER VASCULAR IMPLANTS AND GRAFTS: ICD-10-CM

## 2020-09-20 DIAGNOSIS — I49.3 VENTRICULAR PREMATURE DEPOLARIZATION: ICD-10-CM

## 2020-09-20 DIAGNOSIS — F03.90 UNSPECIFIED DEMENTIA, UNSPECIFIED SEVERITY, WITHOUT BEHAVIORAL DISTURBANCE, PSYCHOTIC DISTURBANCE, MOOD DISTURBANCE, AND ANXIETY: ICD-10-CM

## 2020-09-20 DIAGNOSIS — M54.2 CERVICALGIA: ICD-10-CM

## 2020-09-20 DIAGNOSIS — I44.0 ATRIOVENTRICULAR BLOCK, FIRST DEGREE: ICD-10-CM
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
## 2020-09-20 DIAGNOSIS — Z86.718 PERSONAL HISTORY OF OTHER VENOUS THROMBOSIS AND EMBOLISM: ICD-10-CM
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
## 2020-09-20 DIAGNOSIS — Z87.19 PERSONAL HISTORY OF OTHER DISEASES OF THE DIGESTIVE SYSTEM: ICD-10-CM
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
## 2020-09-20 LAB
ALBUMIN SERPL ELPH-MCNC: 3.5 G/DL — SIGNIFICANT CHANGE UP (ref 3.3–5)
ALP SERPL-CCNC: 80 U/L — SIGNIFICANT CHANGE UP (ref 40–120)
ALT FLD-CCNC: 17 U/L — SIGNIFICANT CHANGE UP (ref 12–78)
ANION GAP SERPL CALC-SCNC: 4 MMOL/L — LOW (ref 5–17)
APPEARANCE UR: CLEAR — SIGNIFICANT CHANGE UP
APTT BLD: 36.7 SEC — HIGH (ref 27.5–35.5)
AST SERPL-CCNC: 18 U/L — SIGNIFICANT CHANGE UP (ref 15–37)
BASOPHILS # BLD AUTO: 0.02 K/UL — SIGNIFICANT CHANGE UP (ref 0–0.2)
BASOPHILS NFR BLD AUTO: 0.4 % — SIGNIFICANT CHANGE UP (ref 0–2)
BILIRUB SERPL-MCNC: 1 MG/DL — SIGNIFICANT CHANGE UP (ref 0.2–1.2)
BILIRUB UR-MCNC: NEGATIVE — SIGNIFICANT CHANGE UP
BUN SERPL-MCNC: 13 MG/DL — SIGNIFICANT CHANGE UP (ref 7–23)
CALCIUM SERPL-MCNC: 8.6 MG/DL — SIGNIFICANT CHANGE UP (ref 8.5–10.1)
CHLORIDE SERPL-SCNC: 103 MMOL/L — SIGNIFICANT CHANGE UP (ref 96–108)
CO2 SERPL-SCNC: 30 MMOL/L — SIGNIFICANT CHANGE UP (ref 22–31)
COLOR SPEC: YELLOW — SIGNIFICANT CHANGE UP
CREAT SERPL-MCNC: 1.08 MG/DL — SIGNIFICANT CHANGE UP (ref 0.5–1.3)
DIFF PNL FLD: NEGATIVE — SIGNIFICANT CHANGE UP
EOSINOPHIL # BLD AUTO: 0.29 K/UL — SIGNIFICANT CHANGE UP (ref 0–0.5)
EOSINOPHIL NFR BLD AUTO: 5.5 % — SIGNIFICANT CHANGE UP (ref 0–6)
GLUCOSE SERPL-MCNC: 121 MG/DL — HIGH (ref 70–99)
GLUCOSE UR QL: NEGATIVE MG/DL — SIGNIFICANT CHANGE UP
HCT VFR BLD CALC: 44.6 % — SIGNIFICANT CHANGE UP (ref 39–50)
HGB BLD-MCNC: 13.9 G/DL — SIGNIFICANT CHANGE UP (ref 13–17)
IMM GRANULOCYTES NFR BLD AUTO: 0.2 % — SIGNIFICANT CHANGE UP (ref 0–1.5)
INR BLD: 2.08 RATIO — HIGH (ref 0.88–1.16)
KETONES UR-MCNC: NEGATIVE — SIGNIFICANT CHANGE UP
LEUKOCYTE ESTERASE UR-ACNC: NEGATIVE — SIGNIFICANT CHANGE UP
LYMPHOCYTES # BLD AUTO: 1.93 K/UL — SIGNIFICANT CHANGE UP (ref 1–3.3)
LYMPHOCYTES # BLD AUTO: 36.6 % — SIGNIFICANT CHANGE UP (ref 13–44)
MCHC RBC-ENTMCNC: 25.4 PG — LOW (ref 27–34)
MCHC RBC-ENTMCNC: 31.2 GM/DL — LOW (ref 32–36)
MCV RBC AUTO: 81.4 FL — SIGNIFICANT CHANGE UP (ref 80–100)
MONOCYTES # BLD AUTO: 0.63 K/UL — SIGNIFICANT CHANGE UP (ref 0–0.9)
MONOCYTES NFR BLD AUTO: 11.9 % — SIGNIFICANT CHANGE UP (ref 2–14)
NEUTROPHILS # BLD AUTO: 2.4 K/UL — SIGNIFICANT CHANGE UP (ref 1.8–7.4)
NEUTROPHILS NFR BLD AUTO: 45.4 % — SIGNIFICANT CHANGE UP (ref 43–77)
NITRITE UR-MCNC: NEGATIVE — SIGNIFICANT CHANGE UP
NRBC # BLD: 0 /100 WBCS — SIGNIFICANT CHANGE UP (ref 0–0)
PH UR: 8 — SIGNIFICANT CHANGE UP (ref 5–8)
PLATELET # BLD AUTO: 219 K/UL — SIGNIFICANT CHANGE UP (ref 150–400)
POTASSIUM SERPL-MCNC: 4.6 MMOL/L — SIGNIFICANT CHANGE UP (ref 3.5–5.3)
POTASSIUM SERPL-SCNC: 4.6 MMOL/L — SIGNIFICANT CHANGE UP (ref 3.5–5.3)
PROT SERPL-MCNC: 8.3 GM/DL — SIGNIFICANT CHANGE UP (ref 6–8.3)
PROT UR-MCNC: NEGATIVE MG/DL — SIGNIFICANT CHANGE UP
PROTHROM AB SERPL-ACNC: 23.3 SEC — HIGH (ref 10.6–13.6)
RBC # BLD: 5.48 M/UL — SIGNIFICANT CHANGE UP (ref 4.2–5.8)
RBC # FLD: 17.6 % — HIGH (ref 10.3–14.5)
SODIUM SERPL-SCNC: 137 MMOL/L — SIGNIFICANT CHANGE UP (ref 135–145)
SP GR SPEC: 1.01 — SIGNIFICANT CHANGE UP (ref 1.01–1.02)
TROPONIN I SERPL-MCNC: <.015 NG/ML — SIGNIFICANT CHANGE UP (ref 0.01–0.04)
UROBILINOGEN FLD QL: NEGATIVE MG/DL — SIGNIFICANT CHANGE UP
WBC # BLD: 5.28 K/UL — SIGNIFICANT CHANGE UP (ref 3.8–10.5)
WBC # FLD AUTO: 5.28 K/UL — SIGNIFICANT CHANGE UP (ref 3.8–10.5)

## 2020-09-20 PROCEDURE — 72170 X-RAY EXAM OF PELVIS: CPT | Mod: 26

## 2020-09-20 PROCEDURE — 93010 ELECTROCARDIOGRAM REPORT: CPT

## 2020-09-20 PROCEDURE — 74177 CT ABD & PELVIS W/CONTRAST: CPT | Mod: 26

## 2020-09-20 PROCEDURE — 72125 CT NECK SPINE W/O DYE: CPT | Mod: 26

## 2020-09-20 PROCEDURE — G0168: CPT

## 2020-09-20 PROCEDURE — 70450 CT HEAD/BRAIN W/O DYE: CPT | Mod: 26

## 2020-09-20 PROCEDURE — 99285 EMERGENCY DEPT VISIT HI MDM: CPT | Mod: 25

## 2020-09-20 PROCEDURE — 71045 X-RAY EXAM CHEST 1 VIEW: CPT | Mod: 26

## 2020-09-20 RX ORDER — HALOPERIDOL DECANOATE 100 MG/ML
5 INJECTION INTRAMUSCULAR ONCE
Refills: 0 | Status: COMPLETED | OUTPATIENT
Start: 2020-09-20 | End: 2020-09-20

## 2020-09-20 RX ORDER — HALOPERIDOL DECANOATE 100 MG/ML
5 INJECTION INTRAMUSCULAR ONCE
Refills: 0 | Status: DISCONTINUED | OUTPATIENT
Start: 2020-09-20 | End: 2020-09-20

## 2020-09-20 RX ADMIN — HALOPERIDOL DECANOATE 5 MILLIGRAM(S): 100 INJECTION INTRAMUSCULAR at 23:25

## 2020-09-20 NOTE — ED PROVIDER NOTE - PHYSICAL EXAMINATION
GEN: Awake, alert, interactive, NAD.  HEAD AND NECK: NC/AT. Airway patent. Neck supple.   EYES:  Clear b/l. EOMI. PERRL.   ENT: Moist mucus membranes.   CARDIAC: Regular rate, regular rhythm. No evident pedal edema.    RESP/CHEST: Normal respiratory effort with no use of accessory muscles or retractions. Clear throughout on auscultation.  ABD: soft, non-distended, non-tender. No rebound, no guarding.   BACK: No midline spinal TTP. No CVAT.   EXTREMITIES: Moving all extremities with no apparent deformities.   SKIN: Warm, dry, intact normal color. No rash.   NEURO: AOx3, CN II-XII grossly intact, no focal deficits.   PSYCH: Appropriate mood and affect. GEN: Awake, alert, appears younger than stated age.   HEAD AND NECK: NC/AT. Airway patent. C-collar in place. No midline C-spine TTP.   EYES:  Clear b/l. EOMI. PERRL.   ENT: Moist mucus membranes.   CARDIAC: Regular rate, regular rhythm. No evident pedal edema.    RESP/CHEST: Normal respiratory effort with no use of accessory muscles or retractions. Clear throughout on auscultation.  ABD: Soft, non-distended. No rebound, no guarding.   BACK: No midline spinal TTP. No CVAT.   EXTREMITIES: Moving all extremities with no apparent deformities.   SKIN: Warm, dry normal color. No rash. L great toe w/ circular avulsion of plantar skin, skin flap attached.   NEURO: CN II-XII grossly intact, no focal deficits.

## 2020-09-20 NOTE — ED PROVIDER NOTE - OBJECTIVE STATEMENT
89yo M BIBA d/t hx of fall at home yesterday, pt reportedly complaining of neck pain, L great toe laceration. Pt w/ hx dementia, unable to provide further HPI / ROS, daughter reportedly en route. Pt states he got up and tripped yesterday. Pt states he arrived from [facility] yesterday. Denies CP, headache. Endorses abd pain. States a girl placed Lidoderm patches on his B/L dorsal feet. Not on AC. 89yo M BIBA d/t hx of fall at home yesterday, pt reportedly complaining of neck pain, L great toe laceration. Pt w/ hx dementia, unable to provide further HPI / ROS, daughter reportedly en route. Pt states he got up and tripped yesterday. [facility] Denies CP, headache. Endorses abd pain. States a girl placed Lidoderm patches on his B/L dorsal feet. Not on AC.    Spoke w/ daughter Tete: As dressing pt for bed, pt turned to sit, fell face forward, daughter tried to catch pt, but heavy, fell on ground, needed help to lift pt up, pt complaining of neck pain when he got up, pt hit struck great toe on leg of bed, w/ heavy bleeding. [facility] x yrs. hx dementia, gallstone infection, no psh, nkda, + on coumadin (hx DVTs, pt has IVC)     meds: melatonin 5mg, calcium / vit d 600 / 400, amlodipine 5mg, lasix 20mg, coreg 3.125, coumadin 3mg on weekends, coumadin 1mg otherwise. Senna 8.6, ASA 81, vit b12 89yo M BIBA d/t hx of fall at home yesterday, pt reportedly complaining of neck pain, L great toe laceration. Pt w/ hx dementia, unable to provide further HPI / ROS, daughter reportedly en route. Pt states he got up and tripped yesterday. Pt states he arrived from [facility] yesterday. Denies CP, headache. Endorses abd pain. States a girl placed Lidoderm patches on his B/L dorsal feet. Not on AC.    Spoke w/ daughter Tete: As dressing pt for bed, pt turned to sit, fell face forward, daughter tried to catch pt, but heavy, fell on ground, needed help to lift pt up, pt complaining of neck pain when he got up, pt struck great toe on leg of bed, w/ heavy bleeding. Pt from [facility] but no recent travel x yrs. hx dementia, gallstone infection, no psh, nkda, + on coumadin (hx DVTs, pt has IVC) 89yo M BIBA d/t hx of fall at home yesterday, pt reportedly complaining of neck pain, L great toe laceration. Pt w/ hx dementia, unable to provide further HPI / ROS, daughter reportedly en route. Pt states he got up and tripped yesterday. Pt states he arrived from [facility] yesterday. Denies CP, headache. Endorses abd pain. States a girl placed Lidoderm patches on his B/L dorsal feet. Not on AC.    Spoke w/ daughter Tete: As dressing pt for bed 1930 tonight, pt turned to sit, fell face forward, daughter tried to catch pt, but heavy, fell on ground, needed help to lift pt up, pt complaining of neck pain when he got up, pt struck great toe on leg of bed, w/ heavy bleeding. Pt from [facility] but no recent travel x yrs. hx dementia, gallstone infection, no psh, nkda, + on coumadin (hx DVTs, pt has IVC)

## 2020-09-20 NOTE — ED ADULT NURSE NOTE - NSIMPLEMENTINTERV_GEN_ALL_ED
Implemented All Fall with Harm Risk Interventions:  [place] to call system. Call bell, personal items and telephone within reach. Instruct patient to call for assistance. Room bathroom lighting operational. Non-slip footwear when patient is off stretcher. Physically safe environment: no spills, clutter or unnecessary equipment. Stretcher in lowest position, wheels locked, appropriate side rails in place. Provide visual cue, wrist band, yellow gown, etc. Monitor gait and stability. Monitor for mental status changes and reorient to person, place, and time. Review medications for side effects contributing to fall risk. Reinforce activity limits and safety measures with patient and family. Provide visual clues: red socks.

## 2020-09-20 NOTE — ED PROVIDER NOTE - CLINICAL SUMMARY MEDICAL DECISION MAKING FREE TEXT BOX
89yo M BIBA s/p witnessed mGLF at home PTA, + on AC. CC neck pain, L great toe avulsion. AFVSS. Pt well appearing, in NAD. C-collar in place. L great toe plantar skin avulsion. Plan: Obtain CBC, CMP, PTT/PT, INR, Trop, ECG, CXR, CT brain, c-spine, abd/pelvis. Re-eval. 89yo M BIBA s/p witnessed mGLF at home PTA, + on AC. CC neck pain, L great toe avulsion. AFVSS. Pt well appearing, in NAD. C-collar in place. L great toe plantar skin avulsion. Plan: Obtain CBC, CMP, PTT/PT, INR, Trop, ECG, CXR, CT brain, c-spine, abd/pelvis. Re-eval. INR 2, labs w/o significant abnormalities. Trop neg. CXR / UA w/o evidence of infection. CT abd/pelvis w/ gallstones, no evidence of infection. CT brain, c-spine w/o acute injury. C-collar cleared. Skin avulsion L great toe repaired w/ Dermabond / Steri-strips. On re-eval, pt resting comfortably. Stable for d/c home. Recommend f/u w/ PCP. Return signs and symptoms d/w pt and w/ his daughter over the phone. They understand and agree w/ this plan.

## 2020-09-20 NOTE — ED PROVIDER NOTE - NSTIMEPROVIDERCAREINITIATE_GEN_ER
20-Sep-2020 20:38 Rhombic Flap Text: The defect edges were debeveled with a #15 scalpel blade.  Given the location of the defect and the proximity to free margins a rhombic flap was deemed most appropriate.  Using a sterile surgical marker, an appropriate rhombic flap was drawn incorporating the defect.    The area thus outlined was incised deep to adipose tissue with a #15 scalpel blade.  The skin margins were undermined to an appropriate distance in all directions utilizing iris scissors.

## 2020-09-20 NOTE — ED ADULT TRIAGE NOTE - CHIEF COMPLAINT QUOTE
As per EMS pt fell and complained of pain to neck and laceration to left 1st toe. Pt takes blood thinner, unknown if pt hit his head

## 2020-09-20 NOTE — ED PROVIDER NOTE - PATIENT PORTAL LINK FT
You can access the FollowMyHealth Patient Portal offered by [facility] by registering at the following website: [email or web address]. By joining [facility]’s FollowMyHealth portal, you will also be able to view your health information using other applications (apps) compatible with our system.

## 2020-09-20 NOTE — ED PROVIDER NOTE - PROGRESS NOTE DETAILS
L great toe w/ avulsion / flap plantar surface, flaps able to be re-aligned, will dermabond / steri-strip into anatomical place, dress. Pt daughter updated, states unable to get pt into home w/o help, aide arrives at 6a. Pt will be d/c'd, but remain in ED until 6a for ambulance  to bring pt home when able. L great toe w/ avulsion / flap, able to be re-aligned, will Dermabond / steri-strip into anatomical place, dress. Pt daughter updated, states unable to get pt into home w/o help, aide arrives at 6a. Pt will be d/c'd, but remain in ED until 6a for ambulance .

## 2020-09-20 NOTE — ED ADULT NURSE NOTE - OBJECTIVE STATEMENT
Per EMS, patient fell and complained of pain to head, neck, back and laceration to left 1st toe. Pt takes blood thinner, unknown if pt hit his head.

## 2020-09-21 VITALS
RESPIRATION RATE: 16 BRPM | DIASTOLIC BLOOD PRESSURE: 77 MMHG | SYSTOLIC BLOOD PRESSURE: 127 MMHG | TEMPERATURE: 98 F | HEART RATE: 71 BPM | OXYGEN SATURATION: 97 %

## 2020-09-21 RX ORDER — ACETAMINOPHEN 500 MG
650 TABLET ORAL ONCE
Refills: 0 | Status: COMPLETED | OUTPATIENT
Start: 2020-09-21 | End: 2020-09-21

## 2021-10-22 ENCOUNTER — INPATIENT (INPATIENT)
Facility: HOSPITAL | Age: 86
LOS: 4 days | Discharge: NURSING, REHAB OR LONG-TERM CARE | End: 2021-10-27
Attending: INTERNAL MEDICINE | Admitting: INTERNAL MEDICINE
Payer: MEDICARE

## 2021-10-22 VITALS
OXYGEN SATURATION: 97 % | TEMPERATURE: 97 F | HEART RATE: 95 BPM | SYSTOLIC BLOOD PRESSURE: 114 MMHG | HEIGHT: 78 IN | RESPIRATION RATE: 20 BRPM | WEIGHT: 220.02 LBS | DIASTOLIC BLOOD PRESSURE: 64 MMHG

## 2021-10-22 DIAGNOSIS — F03.90 UNSPECIFIED DEMENTIA WITHOUT BEHAVIORAL DISTURBANCE: ICD-10-CM

## 2021-10-22 DIAGNOSIS — R06.2 WHEEZING: ICD-10-CM

## 2021-10-22 DIAGNOSIS — T85.628A DISPLACEMENT OF OTHER SPECIFIED INTERNAL PROSTHETIC DEVICES, IMPLANTS AND GRAFTS, INITIAL ENCOUNTER: ICD-10-CM

## 2021-10-22 DIAGNOSIS — I10 ESSENTIAL (PRIMARY) HYPERTENSION: ICD-10-CM

## 2021-10-22 DIAGNOSIS — Z91.81 HISTORY OF FALLING: ICD-10-CM
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
## 2021-10-22 DIAGNOSIS — Z95.828 PRESENCE OF OTHER VASCULAR IMPLANTS AND GRAFTS: ICD-10-CM
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
## 2021-10-22 DIAGNOSIS — Z79.01 LONG TERM (CURRENT) USE OF ANTICOAGULANTS: ICD-10-CM

## 2021-10-22 DIAGNOSIS — R31.0 GROSS HEMATURIA: ICD-10-CM

## 2021-10-22 DIAGNOSIS — E78.5 HYPERLIPIDEMIA, UNSPECIFIED: ICD-10-CM
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
## 2021-10-22 DIAGNOSIS — R06.02 SHORTNESS OF BREATH: ICD-10-CM

## 2021-10-22 DIAGNOSIS — F03.90 UNSPECIFIED DEMENTIA, UNSPECIFIED SEVERITY, WITHOUT BEHAVIORAL DISTURBANCE, PSYCHOTIC DISTURBANCE, MOOD DISTURBANCE, AND ANXIETY: ICD-10-CM

## 2021-10-22 DIAGNOSIS — D64.9 ANEMIA, UNSPECIFIED: ICD-10-CM

## 2021-10-22 DIAGNOSIS — Z86.718 PERSONAL HISTORY OF OTHER VENOUS THROMBOSIS AND EMBOLISM: ICD-10-CM

## 2021-10-22 DIAGNOSIS — T83.89XA OTHER SPECIFIED COMPLICATION OF GENITOURINARY PROSTHETIC DEVICES, IMPLANTS AND GRAFTS, INITIAL ENCOUNTER: ICD-10-CM

## 2021-10-22 DIAGNOSIS — J90 PLEURAL EFFUSION, NOT ELSEWHERE CLASSIFIED: ICD-10-CM

## 2021-10-22 DIAGNOSIS — R33.9 RETENTION OF URINE, UNSPECIFIED: ICD-10-CM

## 2021-10-22 DIAGNOSIS — Z98.89 OTHER SPECIFIED POSTPROCEDURAL STATES: Chronic | ICD-10-CM

## 2021-10-22 DIAGNOSIS — R29.6 REPEATED FALLS: ICD-10-CM

## 2021-10-22 DIAGNOSIS — M19.90 UNSPECIFIED OSTEOARTHRITIS, UNSPECIFIED SITE: ICD-10-CM

## 2021-10-22 LAB
ALBUMIN SERPL ELPH-MCNC: 3 G/DL — LOW (ref 3.3–5)
ALP SERPL-CCNC: 121 U/L — HIGH (ref 40–120)
ALT FLD-CCNC: 27 U/L — SIGNIFICANT CHANGE UP (ref 12–78)
ANION GAP SERPL CALC-SCNC: 5 MMOL/L — SIGNIFICANT CHANGE UP (ref 5–17)
APPEARANCE UR: CLEAR — SIGNIFICANT CHANGE UP
APTT BLD: 37.9 SEC — HIGH (ref 27.5–35.5)
AST SERPL-CCNC: 28 U/L — SIGNIFICANT CHANGE UP (ref 15–37)
BASOPHILS # BLD AUTO: 0.03 K/UL — SIGNIFICANT CHANGE UP (ref 0–0.2)
BASOPHILS NFR BLD AUTO: 0.5 % — SIGNIFICANT CHANGE UP (ref 0–2)
BILIRUB SERPL-MCNC: 0.9 MG/DL — SIGNIFICANT CHANGE UP (ref 0.2–1.2)
BILIRUB UR-MCNC: NEGATIVE — SIGNIFICANT CHANGE UP
BUN SERPL-MCNC: 13 MG/DL — SIGNIFICANT CHANGE UP (ref 7–23)
CALCIUM SERPL-MCNC: 8.5 MG/DL — SIGNIFICANT CHANGE UP (ref 8.5–10.1)
CHLORIDE SERPL-SCNC: 107 MMOL/L — SIGNIFICANT CHANGE UP (ref 96–108)
CO2 SERPL-SCNC: 29 MMOL/L — SIGNIFICANT CHANGE UP (ref 22–31)
COLOR SPEC: YELLOW — SIGNIFICANT CHANGE UP
CREAT SERPL-MCNC: 1 MG/DL — SIGNIFICANT CHANGE UP (ref 0.5–1.3)
DIFF PNL FLD: ABNORMAL
EOSINOPHIL # BLD AUTO: 0.37 K/UL — SIGNIFICANT CHANGE UP (ref 0–0.5)
EOSINOPHIL NFR BLD AUTO: 6.1 % — HIGH (ref 0–6)
EPI CELLS # UR: SIGNIFICANT CHANGE UP
FLUAV AG NPH QL: SIGNIFICANT CHANGE UP
FLUBV AG NPH QL: SIGNIFICANT CHANGE UP
GLUCOSE SERPL-MCNC: 144 MG/DL — HIGH (ref 70–99)
GLUCOSE UR QL: NEGATIVE MG/DL — SIGNIFICANT CHANGE UP
HCT VFR BLD CALC: 39.2 % — SIGNIFICANT CHANGE UP (ref 39–50)
HGB BLD-MCNC: 12.5 G/DL — LOW (ref 13–17)
IMM GRANULOCYTES NFR BLD AUTO: 0.3 % — SIGNIFICANT CHANGE UP (ref 0–1.5)
INR BLD: 3.01 RATIO — HIGH (ref 0.88–1.16)
KETONES UR-MCNC: NEGATIVE — SIGNIFICANT CHANGE UP
LEUKOCYTE ESTERASE UR-ACNC: NEGATIVE — SIGNIFICANT CHANGE UP
LYMPHOCYTES # BLD AUTO: 2.03 K/UL — SIGNIFICANT CHANGE UP (ref 1–3.3)
LYMPHOCYTES # BLD AUTO: 33.3 % — SIGNIFICANT CHANGE UP (ref 13–44)
MAGNESIUM SERPL-MCNC: 2.4 MG/DL — SIGNIFICANT CHANGE UP (ref 1.6–2.6)
MCHC RBC-ENTMCNC: 25.2 PG — LOW (ref 27–34)
MCHC RBC-ENTMCNC: 31.9 GM/DL — LOW (ref 32–36)
MCV RBC AUTO: 78.9 FL — LOW (ref 80–100)
MONOCYTES # BLD AUTO: 0.79 K/UL — SIGNIFICANT CHANGE UP (ref 0–0.9)
MONOCYTES NFR BLD AUTO: 13 % — SIGNIFICANT CHANGE UP (ref 2–14)
NEUTROPHILS # BLD AUTO: 2.86 K/UL — SIGNIFICANT CHANGE UP (ref 1.8–7.4)
NEUTROPHILS NFR BLD AUTO: 46.8 % — SIGNIFICANT CHANGE UP (ref 43–77)
NITRITE UR-MCNC: NEGATIVE — SIGNIFICANT CHANGE UP
NRBC # BLD: 0 /100 WBCS — SIGNIFICANT CHANGE UP (ref 0–0)
NT-PROBNP SERPL-SCNC: 280 PG/ML — SIGNIFICANT CHANGE UP (ref 0–450)
PH UR: 6 — SIGNIFICANT CHANGE UP (ref 5–8)
PLATELET # BLD AUTO: 241 K/UL — SIGNIFICANT CHANGE UP (ref 150–400)
POTASSIUM SERPL-MCNC: 4.7 MMOL/L — SIGNIFICANT CHANGE UP (ref 3.5–5.3)
POTASSIUM SERPL-SCNC: 4.7 MMOL/L — SIGNIFICANT CHANGE UP (ref 3.5–5.3)
PROT SERPL-MCNC: 7.9 GM/DL — SIGNIFICANT CHANGE UP (ref 6–8.3)
PROT UR-MCNC: NEGATIVE MG/DL — SIGNIFICANT CHANGE UP
PROTHROM AB SERPL-ACNC: 33.1 SEC — HIGH (ref 10.6–13.6)
RBC # BLD: 4.97 M/UL — SIGNIFICANT CHANGE UP (ref 4.2–5.8)
RBC # FLD: 17.4 % — HIGH (ref 10.3–14.5)
RBC CASTS # UR COMP ASSIST: ABNORMAL /HPF (ref 0–4)
SARS-COV-2 RNA SPEC QL NAA+PROBE: SIGNIFICANT CHANGE UP
SODIUM SERPL-SCNC: 141 MMOL/L — SIGNIFICANT CHANGE UP (ref 135–145)
SP GR SPEC: 1.01 — SIGNIFICANT CHANGE UP (ref 1.01–1.02)
TROPONIN I SERPL-MCNC: <.015 NG/ML — SIGNIFICANT CHANGE UP (ref 0.01–0.04)
UROBILINOGEN FLD QL: NEGATIVE MG/DL — SIGNIFICANT CHANGE UP
WBC # BLD: 6.1 K/UL — SIGNIFICANT CHANGE UP (ref 3.8–10.5)
WBC # FLD AUTO: 6.1 K/UL — SIGNIFICANT CHANGE UP (ref 3.8–10.5)

## 2021-10-22 PROCEDURE — 93970 EXTREMITY STUDY: CPT | Mod: 26

## 2021-10-22 PROCEDURE — 99291 CRITICAL CARE FIRST HOUR: CPT | Mod: 25

## 2021-10-22 PROCEDURE — 99223 1ST HOSP IP/OBS HIGH 75: CPT

## 2021-10-22 PROCEDURE — 70450 CT HEAD/BRAIN W/O DYE: CPT | Mod: 26,MA

## 2021-10-22 PROCEDURE — 72125 CT NECK SPINE W/O DYE: CPT | Mod: 26,MA

## 2021-10-22 PROCEDURE — 71250 CT THORAX DX C-: CPT | Mod: 26,MA

## 2021-10-22 RX ORDER — ALBUTEROL 90 UG/1
4 AEROSOL, METERED ORAL ONCE
Refills: 0 | Status: COMPLETED | OUTPATIENT
Start: 2021-10-22 | End: 2021-10-22

## 2021-10-22 RX ORDER — ALBUTEROL 90 UG/1
1 AEROSOL, METERED ORAL EVERY 6 HOURS
Refills: 0 | Status: DISCONTINUED | OUTPATIENT
Start: 2021-10-22 | End: 2021-10-27

## 2021-10-22 RX ORDER — SENNA PLUS 8.6 MG/1
2 TABLET ORAL AT BEDTIME
Refills: 0 | Status: DISCONTINUED | OUTPATIENT
Start: 2021-10-22 | End: 2021-10-27

## 2021-10-22 RX ORDER — LANOLIN ALCOHOL/MO/W.PET/CERES
3 CREAM (GRAM) TOPICAL AT BEDTIME
Refills: 0 | Status: DISCONTINUED | OUTPATIENT
Start: 2021-10-22 | End: 2021-10-27

## 2021-10-22 RX ORDER — ENOXAPARIN SODIUM 100 MG/ML
100 INJECTION SUBCUTANEOUS ONCE
Refills: 0 | Status: COMPLETED | OUTPATIENT
Start: 2021-10-22 | End: 2021-10-22

## 2021-10-22 RX ORDER — ACETAMINOPHEN 500 MG
650 TABLET ORAL EVERY 6 HOURS
Refills: 0 | Status: DISCONTINUED | OUTPATIENT
Start: 2021-10-22 | End: 2021-10-27

## 2021-10-22 RX ORDER — ESCITALOPRAM OXALATE 10 MG/1
10 TABLET, FILM COATED ORAL DAILY
Refills: 0 | Status: DISCONTINUED | OUTPATIENT
Start: 2021-10-22 | End: 2021-10-27

## 2021-10-22 RX ORDER — CARVEDILOL PHOSPHATE 80 MG/1
3.12 CAPSULE, EXTENDED RELEASE ORAL EVERY 12 HOURS
Refills: 0 | Status: DISCONTINUED | OUTPATIENT
Start: 2021-10-22 | End: 2021-10-27

## 2021-10-22 RX ORDER — MEMANTINE HYDROCHLORIDE 10 MG/1
5 TABLET ORAL
Refills: 0 | Status: DISCONTINUED | OUTPATIENT
Start: 2021-10-22 | End: 2021-10-27

## 2021-10-22 RX ADMIN — CARVEDILOL PHOSPHATE 3.12 MILLIGRAM(S): 80 CAPSULE, EXTENDED RELEASE ORAL at 18:06

## 2021-10-22 RX ADMIN — ENOXAPARIN SODIUM 100 MILLIGRAM(S): 100 INJECTION SUBCUTANEOUS at 18:03

## 2021-10-22 RX ADMIN — Medication 50 MILLIGRAM(S): at 12:58

## 2021-10-22 RX ADMIN — ALBUTEROL 1 PUFF(S): 90 AEROSOL, METERED ORAL at 18:03

## 2021-10-22 RX ADMIN — SENNA PLUS 2 TABLET(S): 8.6 TABLET ORAL at 22:46

## 2021-10-22 RX ADMIN — MEMANTINE HYDROCHLORIDE 5 MILLIGRAM(S): 10 TABLET ORAL at 18:04

## 2021-10-22 RX ADMIN — ALBUTEROL 4 PUFF(S): 90 AEROSOL, METERED ORAL at 12:59

## 2021-10-22 NOTE — H&P ADULT - PROBLEM SELECTOR PLAN 2
Reportedly noted wheezing in triage, improve with albuterol inhaler. Patient was also given prednisone 50mg in ED  Patient appear resting comfortably with no respiratory distress or wheezing  Will order albuterol inhaler prn  Questionable history of asthma  Will monitor respiratory status

## 2021-10-22 NOTE — H&P ADULT - PROBLEM SELECTOR PLAN 1
present to ED with complain of fall- unable to reach family for detailed history  CT head with no acute pathology. CT C spine with no fracture. CT chest with no acute finding. Small bilateral pleural effusions  PT consult  Fall precaution

## 2021-10-22 NOTE — H&P ADULT - ASSESSMENT
89 years old male patient with h/o DVT on coumadin, HTN, HLD, OA, dementia present to ED with fall at home.  Hemodynamically stable, afebrile and sat well at RA in ED. No leukocytosis, Hb 12.5, Plt 241, INR 3, Cr 1, K 4.7, glucose 144, trop negative, proBNP 280. UA negative for infection. CT head with no acute pathology. CT C spine with no fracture. CT chest with no acute finding. Small bilateral pleural effusions. Reportedly noted wheezing in triage, improve with albuterol inhaler. Patient was also given prednisone 50mg in ED    Admitted with fall

## 2021-10-22 NOTE — CONSULT NOTE ADULT - ASSESSMENT
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 Unable to get reliable history from patient due to dementia. Family unreachable at this time. Urology consulted for urinary retention.     Recs:  --Maintain    --Trend Cr  --Can ToV prior to dc    Seen and discussed with Dr. Mobley

## 2021-10-22 NOTE — H&P ADULT - NSHPPHYSICALEXAM_GEN_ALL_CORE
CONSTITUTIONAL: Well developed, pleasantly confused patient, no acute distress. BP- 134/66, HR- 67, RR-18  EYES: PERRL, EOMI, no scleral icterus  ENT: Mucosa moist, tongue normal.   NECK: Neck supple, trachea midline, non-tender, no masses or thyromegaly.  CARDIAC: Normal S1 and S2. Regular rate and rhythms. Pedal edema +. Peripheral pulses intact  LUNGS: Clear to auscultation, equal air entry both lungs. Bilateral rales +. Normal respiratory effort.   ABDOMEN: Soft, nondistended, nontender. No guarding or rebound tenderness. No hepatomegaly or splenomegaly.   MUSCULOSKELETAL: Normocephalic, atraumatic. Spine normal without deformity or tenderness. No clubbing or cyanosis.   NEUROLOGICAL: No gross motor or sensory deficits  PSYCHIATRIC: Pleasantly confused

## 2021-10-22 NOTE — H&P ADULT - NSHPREVIEWOFSYSTEMS_GEN_ALL_CORE
CONSTITUTIONAL: No fever, chills   EYES: No eye pain. No vision changes  ENT:  No hearing changes or pain  Cardiovascular:  No Chest Pain, palpitation  Respiratory:  No cough, SOB   Gastrointestinal:  No nausea, vomiting, diarrhea  Genitourinary: No dysuria, frequency or hematuria  Musculoskeletal:  neck pain  Neuro:  generalized weakness  Psych:  No hallucination  Heme/Lymph:  on coumadin  ALLERGIC/IMMUNOlOGIC: No seasonal allergy, hives, hay fever symptoms

## 2021-10-22 NOTE — ED PROVIDER NOTE - PHYSICAL EXAMINATION
Gen: Alert, NAD  Head: NC, AT   Eyes: PERRL, EOMI, normal lids/conjunctiva  ENT: normal hearing, patent oropharynx without erythema/exudate, uvula midline  Neck: supple, no tenderness, Trachea midline  Pulm: diminished breath sounds, crackles in the bases   CV: RRR, no M/R/G, 2+ radial and dp pulses bl, extensive lower ext pitting edema right > left.  Abd: soft, NT/ND, +BS, no hepatosplenomegaly  Mskel: extremities x4 with normal ROM and no joint effusions. no ctl spine ttp.   Skin: no rash, no bruising   Neuro: AAOx2, no sensory/motor deficits, CN 2-12 intact

## 2021-10-22 NOTE — ED PROVIDER NOTE - CLINICAL SUMMARY MEDICAL DECISION MAKING FREE TEXT BOX
fall. ct head and c spine wnl. diminishes breath sounds. likely volume up. fall. ct head and c spine wnl. diminishes breath sounds. likely volume up. gave stat albuterol and steroids. ct shows pleural effusion  abd distended.  placed. 300 out immediately. urology to see. admit sob 2/2 asthma

## 2021-10-22 NOTE — ED ADULT NURSE NOTE - CHIEF COMPLAINT QUOTE
pt biba from home c/o per daughter( [phone]) state fall at home 2 days, now c/o lower back pain + wheezing hx asthma covid status unknown

## 2021-10-22 NOTE — H&P ADULT - HISTORY OF PRESENT ILLNESS
89 years old male patient with h/o DVT on coumadin, HTN, HLD, OA, dementia present to ED with fall at home. Unable to get reliable history from patient due to dementia. Family unreachable at this time.   Hemodynamically stable, afebrile and sat well at RA in ED. No leukocytosis, Hb 12.5, Plt 241, INR 3, Cr 1, K 4.7, glucose 144, trop negative, proBNP 280. UA negative for infection. CT head with no acute pathology. CT C spine with no fracture. CT chest with no acute finding. Small bilateral pleural effusions. Reportedly noted wheezing in triage, improve with albuterol inhaler. Patient was also given prednisone 50mg in ED

## 2021-10-22 NOTE — ED ADULT TRIAGE NOTE - CHIEF COMPLAINT QUOTE
pt biba from home c/o per daughter( [phone]) state fall at home 2 days, now c/o lower back pain + wheezing hx asthma covid status unknown

## 2021-10-22 NOTE — CONSULT NOTE ADULT - SUBJECTIVE AND OBJECTIVE BOX
UROLOGY CONSULT NOTE    Patient is a 89y old  Male who presents with a chief complaint of fall (22 Oct 2021 16:43)      HPI:  89 years old male patient with h/o DVT on coumadin, HTN, HLD, OA, dementia present to ED with fall at home. Unable to get reliable history from patient due to dementia. Family unreachable at this time.   Hemodynamically stable, afebrile and sat well at RA in ED. No leukocytosis, Hb 12.5, Plt 241, INR 3, Cr 1, K 4.7, glucose 144, trop negative, proBNP 280. UA negative for infection. CT head with no acute pathology. CT C spine with no fracture. CT chest with no acute finding. Small bilateral pleural effusions. Reportedly noted wheezing in triage, improve with albuterol inhaler. Patient was also given prednisone 50mg in ED        PAST MEDICAL & SURGICAL HISTORY:  DVT (deep venous thrombosis)  bilateral, on coumadin  Hypertension  Depression  Dementia  HLD (hyperlipidemia)  OA (osteoarthritis)  Cholelithiasis  S/P IVC filter    Review of Systems:      MEDICATIONS  (STANDING):  carvedilol 3.125 milliGRAM(s) Oral every 12 hours  enoxaparin Injectable 100 milliGRAM(s) SubCutaneous once  escitalopram 10 milliGRAM(s) Oral daily  memantine 5 milliGRAM(s) Oral two times a day  senna 2 Tablet(s) Oral at bedtime    MEDICATIONS  (PRN):  acetaminophen     Tablet .. 650 milliGRAM(s) Oral every 6 hours PRN Mild Pain (1 - 3), Moderate Pain (4 - 6)  ALBUTerol    90 MICROgram(s) HFA Inhaler 1 Puff(s) Inhalation every 6 hours PRN Shortness of Breath and/or Wheezing  melatonin 3 milliGRAM(s) Oral at bedtime PRN Insomnia      Allergies    No Known Allergies    Intolerances        SOCIAL HISTORY          Smoking: Yes [ ]  No [ ]   ______pk yrs          ETOH  Yes [ ]  No [ ]  Social [ ]          DRUGS:  Yes [ ]  No [ ]  if so what______________    FAMILY HISTORY:  No pertinent family history        Vital Signs Last 24 Hrs  T(C): 36.5 (22 Oct 2021 15:48), Max: 36.5 (22 Oct 2021 15:48)  T(F): 97.7 (22 Oct 2021 15:48), Max: 97.7 (22 Oct 2021 15:48)  HR: 67 (22 Oct 2021 15:48) (65 - 95)  BP: 134/66 (22 Oct 2021 15:48) (114/64 - 134/66)  BP(mean): --  RR: 18 (22 Oct 2021 15:48) (18 - 20)  SpO2: 97% (22 Oct 2021 15:48) (95% - 97%)    Physical Exam:    General:  Appears stated age, well-groomed, well-nourished, no distress  Eyes : SAIGE  HENT:  WNL, no JVD  Chest:  clear breath sounds  Cardiovascular:  Regular rate & rhythm  Abdomen:    :  Extremities:    Skin:    Musculoskeletal:    Neuro/Psych:        LABS:                        12.5   6.10  )-----------( 241      ( 22 Oct 2021 14:10 )             39.2     10    141  |  107  |  13  ----------------------------<  144<H>  4.7   |  29  |  1.00    Ca    8.5      22 Oct 2021 13:58  Mg     2.4     10-    TPro  7.9  /  Alb  3.0<L>  /  TBili  0.9  /  DBili  x   /  AST  28  /  ALT  27  /  AlkPhos  121<H>  10-22    PT/INR - ( 22 Oct 2021 13:58 )   PT: 33.1 sec;   INR: 3.01 ratio         PTT - ( 22 Oct 2021 13:58 )  PTT:37.9 sec  Urinalysis Basic - ( 22 Oct 2021 14:39 )    Color: Yellow / Appearance: Clear / S.010 / pH: x  Gluc: x / Ketone: Negative  / Bili: Negative / Urobili: Negative mg/dL   Blood: x / Protein: Negative mg/dL / Nitrite: Negative   Leuk Esterase: Negative / RBC: 25-50 /HPF / WBC x   Sq Epi: x / Non Sq Epi: Occasional / Bacteria: x        RADIOLOGY & ADDITIONAL STUDIES: UROLOGY CONSULT NOTE    Patient is a 89y old  Male who presents with a chief complaint of fall (22 Oct 2021 16:43)      HPI:  89 years old male patient with h/o DVT on coumadin, HTN, HLD, OA, dementia present to ED with fall at home. Unable to get reliable history from patient due to dementia. Family unreachable at this time.   Hemodynamically stable, afebrile and sat well at RA in ED. No leukocytosis, Hb 12.5, Plt 241, INR 3, Cr 1, K 4.7, glucose 144, trop negative, proBNP 280. UA negative for infection. CT head with no acute pathology. CT C spine with no fracture. CT chest with no acute finding. Small bilateral pleural effusions. Reportedly noted wheezing in triage, improve with albuterol inhaler. Patient was also given prednisone 50mg in ED    Urology consulted for urinary retention. Unable to get reliable history from patient due to dementia. Family unreachable at this time. Cr 1.00. Indwelling  with pink tinged urine in tubing.         PAST MEDICAL & SURGICAL HISTORY:  DVT (deep venous thrombosis)  bilateral, on coumadin  Hypertension  Depression  Dementia  HLD (hyperlipidemia)  OA (osteoarthritis)  Cholelithiasis  S/P IVC filter    Review of Systems:  Unable to assess due to mental status     MEDICATIONS  (STANDING):  carvedilol 3.125 milliGRAM(s) Oral every 12 hours  enoxaparin Injectable 100 milliGRAM(s) SubCutaneous once  escitalopram 10 milliGRAM(s) Oral daily  memantine 5 milliGRAM(s) Oral two times a day  senna 2 Tablet(s) Oral at bedtime    MEDICATIONS  (PRN):  acetaminophen     Tablet .. 650 milliGRAM(s) Oral every 6 hours PRN Mild Pain (1 - 3), Moderate Pain (4 - 6)  ALBUTerol    90 MICROgram(s) HFA Inhaler 1 Puff(s) Inhalation every 6 hours PRN Shortness of Breath and/or Wheezing  melatonin 3 milliGRAM(s) Oral at bedtime PRN Insomnia      Allergies  No Known Allergies    SOCIAL HISTORY          Smoking: Yes [ ]  No [ ]   ______pk yrs          ETOH  Yes [ ]  No [ ]  Social [ ]          DRUGS:  Yes [ ]  No [ ]  if so what______________    FAMILY HISTORY:  No pertinent family history        Vital Signs Last 24 Hrs  T(C): 36.5 (22 Oct 2021 15:48), Max: 36.5 (22 Oct 2021 15:48)  T(F): 97.7 (22 Oct 2021 15:48), Max: 97.7 (22 Oct 2021 15:48)  HR: 67 (22 Oct 2021 15:48) (65 - 95)  BP: 134/66 (22 Oct 2021 15:48) (114/64 - 134/66)  BP(mean): --  RR: 18 (22 Oct 2021 15:48) (18 - 20)  SpO2: 97% (22 Oct 2021 15:48) (95% - 97%)    Physical Exam:    GENERAL: NAD, well-groomed, thin  HEAD:  Atraumatic, Normocephalic  EYES: EOMI, PERRLA, conjunctiva and sclera clear  NECK: Supple, No JVD, Normal thyroid  NERVOUS SYSTEM:  Confused  CHEST/LUNG: Clear to percussion bilaterally  HEART: Regular rate and rhythm  ABDOMEN: Soft, mildly diffuse tenderness, Nondistended  EXTREMITIES:  2+ Peripheral Pulses  LYMPH: No cervical adenopathy  : No suprapubic tenderness, no bladder distention, no gross hematuria.  SKIN: No rashes or lesions      LABS:                        12.5   6.10  )-----------( 241      ( 22 Oct 2021 14:10 )             39.2     10    141  |  107  |  13  ----------------------------<  144<H>  4.7   |  29  |  1.00    Ca    8.5      22 Oct 2021 13:58  Mg     2.4     10    TPro  7.9  /  Alb  3.0<L>  /  TBili  0.9  /  DBili  x   /  AST  28  /  ALT  27  /  AlkPhos  121<H>  10-22    PT/INR - ( 22 Oct 2021 13:58 )   PT: 33.1 sec;   INR: 3.01 ratio         PTT - ( 22 Oct 2021 13:58 )  PTT:37.9 sec  Urinalysis Basic - ( 22 Oct 2021 14:39 )    Color: Yellow / Appearance: Clear / S.010 / pH: x  Gluc: x / Ketone: Negative  / Bili: Negative / Urobili: Negative mg/dL   Blood: x / Protein: Negative mg/dL / Nitrite: Negative   Leuk Esterase: Negative / RBC: 25-50 /HPF / WBC x   Sq Epi: x / Non Sq Epi: Occasional / Bacteria: x        RADIOLOGY & ADDITIONAL STUDIES:

## 2021-10-22 NOTE — H&P ADULT - PROBLEM SELECTOR PLAN 5
History of DVT s/p IVC filter, on coumadin  INR 3  Need to confirm coumadin dose   Monitor INR  fall/bleeding precaution

## 2021-10-23 DIAGNOSIS — Z29.9 ENCOUNTER FOR PROPHYLACTIC MEASURES, UNSPECIFIED: ICD-10-CM

## 2021-10-23 DIAGNOSIS — R33.9 RETENTION OF URINE, UNSPECIFIED: ICD-10-CM

## 2021-10-23 DIAGNOSIS — D62 ACUTE POSTHEMORRHAGIC ANEMIA: ICD-10-CM

## 2021-10-23 LAB
ALBUMIN SERPL ELPH-MCNC: 2.9 G/DL — LOW (ref 3.3–5)
ALP SERPL-CCNC: 122 U/L — HIGH (ref 40–120)
ALT FLD-CCNC: 21 U/L — SIGNIFICANT CHANGE UP (ref 12–78)
ANION GAP SERPL CALC-SCNC: 5 MMOL/L — SIGNIFICANT CHANGE UP (ref 5–17)
AST SERPL-CCNC: 14 U/L — LOW (ref 15–37)
BILIRUB SERPL-MCNC: 0.9 MG/DL — SIGNIFICANT CHANGE UP (ref 0.2–1.2)
BUN SERPL-MCNC: 17 MG/DL — SIGNIFICANT CHANGE UP (ref 7–23)
CALCIUM SERPL-MCNC: 9.4 MG/DL — SIGNIFICANT CHANGE UP (ref 8.5–10.1)
CHLORIDE SERPL-SCNC: 105 MMOL/L — SIGNIFICANT CHANGE UP (ref 96–108)
CO2 SERPL-SCNC: 31 MMOL/L — SIGNIFICANT CHANGE UP (ref 22–31)
COVID-19 SPIKE DOMAIN AB INTERP: NEGATIVE — SIGNIFICANT CHANGE UP
COVID-19 SPIKE DOMAIN ANTIBODY RESULT: 0.4 U/ML — SIGNIFICANT CHANGE UP
CREAT SERPL-MCNC: 0.96 MG/DL — SIGNIFICANT CHANGE UP (ref 0.5–1.3)
GLUCOSE SERPL-MCNC: 101 MG/DL — HIGH (ref 70–99)
HCT VFR BLD CALC: 39.6 % — SIGNIFICANT CHANGE UP (ref 39–50)
HCT VFR BLD CALC: 40.4 % — SIGNIFICANT CHANGE UP (ref 39–50)
HGB BLD-MCNC: 12.7 G/DL — LOW (ref 13–17)
HGB BLD-MCNC: 13 G/DL — SIGNIFICANT CHANGE UP (ref 13–17)
INR BLD: 3.16 RATIO — HIGH (ref 0.88–1.16)
MCHC RBC-ENTMCNC: 25.3 PG — LOW (ref 27–34)
MCHC RBC-ENTMCNC: 25.4 PG — LOW (ref 27–34)
MCHC RBC-ENTMCNC: 32.1 GM/DL — SIGNIFICANT CHANGE UP (ref 32–36)
MCHC RBC-ENTMCNC: 32.2 GM/DL — SIGNIFICANT CHANGE UP (ref 32–36)
MCV RBC AUTO: 79 FL — LOW (ref 80–100)
MCV RBC AUTO: 79.1 FL — LOW (ref 80–100)
NRBC # BLD: 0 /100 WBCS — SIGNIFICANT CHANGE UP (ref 0–0)
NRBC # BLD: 0 /100 WBCS — SIGNIFICANT CHANGE UP (ref 0–0)
PLATELET # BLD AUTO: 264 K/UL — SIGNIFICANT CHANGE UP (ref 150–400)
PLATELET # BLD AUTO: 274 K/UL — SIGNIFICANT CHANGE UP (ref 150–400)
POTASSIUM SERPL-MCNC: 4 MMOL/L — SIGNIFICANT CHANGE UP (ref 3.5–5.3)
POTASSIUM SERPL-SCNC: 4 MMOL/L — SIGNIFICANT CHANGE UP (ref 3.5–5.3)
PROT SERPL-MCNC: 7.6 GM/DL — SIGNIFICANT CHANGE UP (ref 6–8.3)
PROTHROM AB SERPL-ACNC: 34.7 SEC — HIGH (ref 10.6–13.6)
RBC # BLD: 5.01 M/UL — SIGNIFICANT CHANGE UP (ref 4.2–5.8)
RBC # BLD: 5.11 M/UL — SIGNIFICANT CHANGE UP (ref 4.2–5.8)
RBC # FLD: 17.3 % — HIGH (ref 10.3–14.5)
RBC # FLD: 17.4 % — HIGH (ref 10.3–14.5)
SARS-COV-2 IGG+IGM SERPL QL IA: 0.4 U/ML — SIGNIFICANT CHANGE UP
SARS-COV-2 IGG+IGM SERPL QL IA: NEGATIVE — SIGNIFICANT CHANGE UP
SODIUM SERPL-SCNC: 141 MMOL/L — SIGNIFICANT CHANGE UP (ref 135–145)
WBC # BLD: 8.82 K/UL — SIGNIFICANT CHANGE UP (ref 3.8–10.5)
WBC # BLD: 9.33 K/UL — SIGNIFICANT CHANGE UP (ref 3.8–10.5)
WBC # FLD AUTO: 8.82 K/UL — SIGNIFICANT CHANGE UP (ref 3.8–10.5)
WBC # FLD AUTO: 9.33 K/UL — SIGNIFICANT CHANGE UP (ref 3.8–10.5)

## 2021-10-23 PROCEDURE — 74176 CT ABD & PELVIS W/O CONTRAST: CPT | Mod: 26

## 2021-10-23 PROCEDURE — 99233 SBSQ HOSP IP/OBS HIGH 50: CPT

## 2021-10-23 RX ORDER — POLYETHYLENE GLYCOL 3350 17 G/17G
17 POWDER, FOR SOLUTION ORAL DAILY
Refills: 0 | Status: DISCONTINUED | OUTPATIENT
Start: 2021-10-23 | End: 2021-10-27

## 2021-10-23 RX ORDER — CEFTRIAXONE 500 MG/1
1000 INJECTION, POWDER, FOR SOLUTION INTRAMUSCULAR; INTRAVENOUS EVERY 24 HOURS
Refills: 0 | Status: COMPLETED | OUTPATIENT
Start: 2021-10-23 | End: 2021-10-25

## 2021-10-23 RX ORDER — LACTULOSE 10 G/15ML
20 SOLUTION ORAL ONCE
Refills: 0 | Status: COMPLETED | OUTPATIENT
Start: 2021-10-23 | End: 2021-10-23

## 2021-10-23 RX ADMIN — ESCITALOPRAM OXALATE 10 MILLIGRAM(S): 10 TABLET, FILM COATED ORAL at 12:58

## 2021-10-23 RX ADMIN — MEMANTINE HYDROCHLORIDE 5 MILLIGRAM(S): 10 TABLET ORAL at 05:28

## 2021-10-23 RX ADMIN — POLYETHYLENE GLYCOL 3350 17 GRAM(S): 17 POWDER, FOR SOLUTION ORAL at 17:21

## 2021-10-23 RX ADMIN — CARVEDILOL PHOSPHATE 3.12 MILLIGRAM(S): 80 CAPSULE, EXTENDED RELEASE ORAL at 05:28

## 2021-10-23 RX ADMIN — MEMANTINE HYDROCHLORIDE 5 MILLIGRAM(S): 10 TABLET ORAL at 17:21

## 2021-10-23 RX ADMIN — LACTULOSE 20 GRAM(S): 10 SOLUTION ORAL at 17:21

## 2021-10-23 RX ADMIN — SENNA PLUS 2 TABLET(S): 8.6 TABLET ORAL at 21:35

## 2021-10-23 RX ADMIN — CARVEDILOL PHOSPHATE 3.12 MILLIGRAM(S): 80 CAPSULE, EXTENDED RELEASE ORAL at 17:21

## 2021-10-23 RX ADMIN — CEFTRIAXONE 100 MILLIGRAM(S): 500 INJECTION, POWDER, FOR SOLUTION INTRAMUSCULAR; INTRAVENOUS at 17:21

## 2021-10-23 NOTE — PROGRESS NOTE ADULT - SUBJECTIVE AND OBJECTIVE BOX
Patient is a 89y old  Male who presents with a chief complaint of fall (23 Oct 2021 15:55)      INTERVAL HPI/ OVERNIGHT EVENTS: Pt was seen and examined at bedside today, the patient is intermittently confused and pulls on lines, pt admitted to having abdominal pain, denies any other complaints.     MEDICATIONS  (STANDING):  carvedilol 3.125 milliGRAM(s) Oral every 12 hours  cefTRIAXone   IVPB 1000 milliGRAM(s) IV Intermittent every 24 hours  escitalopram 10 milliGRAM(s) Oral daily  lactulose Syrup 20 Gram(s) Oral once  memantine 5 milliGRAM(s) Oral two times a day  polyethylene glycol 3350 17 Gram(s) Oral daily  senna 2 Tablet(s) Oral at bedtime    MEDICATIONS  (PRN):  acetaminophen     Tablet .. 650 milliGRAM(s) Oral every 6 hours PRN Mild Pain (1 - 3), Moderate Pain (4 - 6)  ALBUTerol    90 MICROgram(s) HFA Inhaler 1 Puff(s) Inhalation every 6 hours PRN Shortness of Breath and/or Wheezing  melatonin 3 milliGRAM(s) Oral at bedtime PRN Insomnia      Allergies    No Known Allergies    Intolerances        REVIEW OF SYSTEMS:    Unable to examine due to [ ] Encephalopathy [ ] Advanced Dementia [ ] Expressive Aphasia [ ] Non-verbal patient    CONSTITUTIONAL: No fever, NO generalized weakness/Fatigue, No weight loss  EYES: No eye pain, visual disturbances, or discharge  ENMT:  No difficulty hearing, tinnitus, vertigo; No sinus or throat pain  NECK: No pain or stiffness  RESPIRATORY: No shortness of breath,  cough, wheezing, sputum or hemoptysis   CARDIOVASCULAR: No chest pain, palpitations, or leg swelling  GASTROINTESTINAL: positive abdominal pain. No nausea, vomiting, diarrhea or constipation. No melena or hematochezia.  GENITOURINARY: No dysuria, frequency, hematuria, or incontinence  NEUROLOGICAL: No headaches, Dizziness, memory loss, loss of strength, numbness, or tremors  SKIN: No itching, burning, rashes, or lesions   MUSCULOSKELETAL: No joint pain or swelling; No muscle, back, or extremity pain  PSYCHIATRIC: No depression, anxiety, mood swings, or difficulty sleeping  HEME/LYMPH: No easy bruising, or bleeding gums      Vital Signs Last 24 Hrs  T(C): 36.7 (23 Oct 2021 12:13), Max: 37.1 (23 Oct 2021 00:38)  T(F): 98.1 (23 Oct 2021 12:13), Max: 98.8 (23 Oct 2021 00:38)  HR: 80 (23 Oct 2021 12:13) (61 - 85)  BP: 153/61 (23 Oct 2021 12:13) (129/64 - 153/61)  BP(mean): --  RR: 17 (23 Oct 2021 12:13) (17 - 22)  SpO2: 97% (23 Oct 2021 12:13) (94% - 98%)    PHYSICAL EXAM:  GENERAL: NAD, well-developed, well-groomed  HEAD:  Atraumatic, Normocephalic  EYES: conjunctiva and sclera clear  ENMT: Moist mucous membranes  NECK: Supple, No JVD, Normal thyroid  CHEST/LUNG: Clear to Auscultation bilaterally; No rales, rhonchi, wheezing, or rubs  HEART: Regular rate and rhythm; No murmurs, rubs, or gallops  ABDOMEN: Soft, LLQ tenderness, Nondistended; Bowel sounds present  EXTREMITIES:  2+ Peripheral Pulses, No clubbing, cyanosis, or edema  SKIN: No rashes or lesions  NERVOUS SYSTEM:  Alert & Oriented X1,  Motor Strength 5/5 B/L upper and lower extremities    LABS:                        13.0   9.33  )-----------( 274      ( 23 Oct 2021 11:46 )             40.4     10-    141  |  105  |  17  ----------------------------<  101<H>  4.0   |  31  |  0.96    Ca    9.4      23 Oct 2021 11:46  Mg     2.4     10    TPro  7.6  /  Alb  2.9<L>  /  TBili  0.9  /  DBili  x   /  AST  14<L>  /  ALT  21  /  AlkPhos  122<H>  10    PT/INR - ( 22 Oct 2021 13:58 )   PT: 33.1 sec;   INR: 3.01 ratio         PTT - ( 22 Oct 2021 13:58 )  PTT:37.9 sec  Urinalysis Basic - ( 22 Oct 2021 14:39 )    Color: Yellow / Appearance: Clear / S.010 / pH: x  Gluc: x / Ketone: Negative  / Bili: Negative / Urobili: Negative mg/dL   Blood: x / Protein: Negative mg/dL / Nitrite: Negative   Leuk Esterase: Negative / RBC: 25-50 /HPF / WBC x   Sq Epi: x / Non Sq Epi: Occasional / Bacteria: x      CAPILLARY BLOOD GLUCOSE              RADIOLOGY & ADDITIONAL TESTS:          Imaging Personally Reviewed:  [ ] YES  [ ] NO    Consultant(s) Notes Reviewed:  [ ] YES  [ ] NO    Care Discussed with Consultants/Other Providers [x ] YES  [ ] NO

## 2021-10-23 NOTE — PROGRESS NOTE ADULT - ASSESSMENT
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 Unable to get reliable history from patient due to dementia. Family unreachable at this time. Urology consulted for urinary retention.     Maintain Knapp follow, Renal function  Can ToV prior to dc  Seen and discussed with Dr. 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 Unable to get reliable history from patient due to dementia. Family unreachable at this time. Urology consulted for urinary retention.     Knapp dislodged - Moses hematuria draining from Knapp, Knapp adjusted    check cbc  Maintain Knapp follow, Renal function  Can ToV prior to dc  Seen and discussed with Dr. 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 Unable to get reliable history from patient due to dementia. Family unreachable at this time. Urology consulted for urinary retention.     Knapp dislodged - Moses hematuria draining from Knapp, Knapp adjusted, Empiric Rocephin x 3days    check cbc, monitor hematuria,    Maintain Knapp follow, Renal function  Can ToV prior to dc  Seen and discussed with Dr. Mobley

## 2021-10-23 NOTE — PROGRESS NOTE ADULT - SUBJECTIVE AND OBJECTIVE BOX
Patient seen and examined bedside resting comfortably.  No Acute events    T: 98.1 (10-23-21 @ 12:13), Max: 98.8 (10-23-21 @ 00:38)  HR: 80 (10-23-21 @ 12:13) (61 - 85)  BP: 153/61 (10-23-21 @ 12:13) (129/64 - 153/61)  RR: 17 (10-23-21 @ 12:13) (17 - 22)  SpO2: 97% (10-23-21 @ 12:13) (94% - 98%)  Wt(kg): --  CAPILLARY BLOOD GLUCOSE          PHYSICAL EXAM:  General: NAD, alert and awake  HEENT: NCAT, EOMI, conjunctiva clear  Chest: nonlabored respirations, good inspiratory effort  Abdomen: soft, NTND.   Extremities: no pedal edema or calf tenderness noted   : No CVA or SP tenderness. Knapp with Clear yellow urine    LABS:                        13.0   9.33  )-----------( 274      ( 23 Oct 2021 11:46 )             40.4   10-23    141  |  105  |  17  ----------------------------<  101<H>  4.0   |  31  |  0.96    Ca    9.4      23 Oct 2021 11:46  Mg     2.4     10-22    TPro  7.6  /  Alb  2.9<L>  /  TBili  0.9  /  DBili  x   /  AST  14<L>  /  ALT  21  /  AlkPhos  122<H>  10-23  PT/INR - ( 22 Oct 2021 13:58 )   PT: 33.1 sec;   INR: 3.01 ratio         PTT - ( 22 Oct 2021 13:58 )  PTT:37.9 sec  I&O's Detail    22 Oct 2021 07:01  -  23 Oct 2021 07:00  --------------------------------------------------------  IN:  Total IN: 0 mL    OUT:    Indwelling Catheter - Urethral (mL): 800 mL  Total OUT: 800 mL    Total NET: -800 mL      23 Oct 2021 07:01  -  23 Oct 2021 15:55  --------------------------------------------------------  IN:    Oral Fluid: 120 mL  Total IN: 120 mL    OUT:  Total OUT: 0 mL    Total NET: 120 mL       Patient seen and examined bedside resting comfortably.   dislodged - Moses hematuria draining from     T: 98.1 (10-23-21 @ 12:13), Max: 98.8 (10-23-21 @ 00:38)  HR: 80 (10-23-21 @ 12:13) (61 - 85)  BP: 153/61 (10-23-21 @ 12:13) (129/64 - 153/61)  RR: 17 (10-23-21 @ 12:13) (17 - 22)  SpO2: 97% (10-23-21 @ 12:13) (94% - 98%)  Wt(kg): --  CAPILLARY BLOOD GLUCOSE          PHYSICAL EXAM:  General: NAD, alert and awake  HEENT: NCAT, EOMI, conjunctiva clear  Chest: nonlabored respirations, good inspiratory effort  Abdomen: soft, NTND.   Extremities: no pedal edema or calf tenderness noted   : No CVA or SP tenderness.  dislodged - Moses hematuria draining from ,  adjusted      LABS:                        13.0   9.33  )-----------( 274      ( 23 Oct 2021 11:46 )             40.4   10-23    141  |  105  |  17  ----------------------------<  101<H>  4.0   |  31  |  0.96    Ca    9.4      23 Oct 2021 11:46  Mg     2.4     10-22    TPro  7.6  /  Alb  2.9<L>  /  TBili  0.9  /  DBili  x   /  AST  14<L>  /  ALT  21  /  AlkPhos  122<H>  10-23  PT/INR - ( 22 Oct 2021 13:58 )   PT: 33.1 sec;   INR: 3.01 ratio         PTT - ( 22 Oct 2021 13:58 )  PTT:37.9 sec  I&O's Detail    22 Oct 2021 07:01  -  23 Oct 2021 07:00  --------------------------------------------------------  IN:  Total IN: 0 mL    OUT:    Indwelling Catheter - Urethral (mL): 800 mL  Total OUT: 800 mL    Total NET: -800 mL      23 Oct 2021 07:01  -  23 Oct 2021 15:55  --------------------------------------------------------  IN:    Oral Fluid: 120 mL  Total IN: 120 mL    OUT:  Total OUT: 0 mL    Total NET: 120 mL

## 2021-10-23 NOTE — PROGRESS NOTE ADULT - TIME BILLING
Lab test review, Radiology Review, Vitals review, Consultant review and discussion, Physical examination,  Assessment and plan

## 2021-10-24 LAB
BLD GP AB SCN SERPL QL: SIGNIFICANT CHANGE UP
HCT VFR BLD CALC: 39.7 % — SIGNIFICANT CHANGE UP (ref 39–50)
HGB BLD-MCNC: 12.6 G/DL — LOW (ref 13–17)
MCHC RBC-ENTMCNC: 25.1 PG — LOW (ref 27–34)
MCHC RBC-ENTMCNC: 31.7 GM/DL — LOW (ref 32–36)
MCV RBC AUTO: 79.2 FL — LOW (ref 80–100)
NRBC # BLD: 0 /100 WBCS — SIGNIFICANT CHANGE UP (ref 0–0)
PLATELET # BLD AUTO: 215 K/UL — SIGNIFICANT CHANGE UP (ref 150–400)
RBC # BLD: 5.01 M/UL — SIGNIFICANT CHANGE UP (ref 4.2–5.8)
RBC # FLD: 17.5 % — HIGH (ref 10.3–14.5)
WBC # BLD: 6.99 K/UL — SIGNIFICANT CHANGE UP (ref 3.8–10.5)
WBC # FLD AUTO: 6.99 K/UL — SIGNIFICANT CHANGE UP (ref 3.8–10.5)

## 2021-10-24 PROCEDURE — 99232 SBSQ HOSP IP/OBS MODERATE 35: CPT

## 2021-10-24 RX ORDER — PHYTONADIONE (VIT K1) 5 MG
2.5 TABLET ORAL ONCE
Refills: 0 | Status: COMPLETED | OUTPATIENT
Start: 2021-10-24 | End: 2021-10-24

## 2021-10-24 RX ADMIN — ALBUTEROL 1 PUFF(S): 90 AEROSOL, METERED ORAL at 07:10

## 2021-10-24 RX ADMIN — SENNA PLUS 2 TABLET(S): 8.6 TABLET ORAL at 21:30

## 2021-10-24 RX ADMIN — MEMANTINE HYDROCHLORIDE 5 MILLIGRAM(S): 10 TABLET ORAL at 05:24

## 2021-10-24 RX ADMIN — MEMANTINE HYDROCHLORIDE 5 MILLIGRAM(S): 10 TABLET ORAL at 17:27

## 2021-10-24 RX ADMIN — POLYETHYLENE GLYCOL 3350 17 GRAM(S): 17 POWDER, FOR SOLUTION ORAL at 11:28

## 2021-10-24 RX ADMIN — Medication 2.5 MILLIGRAM(S): at 16:49

## 2021-10-24 RX ADMIN — ALBUTEROL 1 PUFF(S): 90 AEROSOL, METERED ORAL at 21:41

## 2021-10-24 RX ADMIN — ESCITALOPRAM OXALATE 10 MILLIGRAM(S): 10 TABLET, FILM COATED ORAL at 11:26

## 2021-10-24 RX ADMIN — CEFTRIAXONE 100 MILLIGRAM(S): 500 INJECTION, POWDER, FOR SOLUTION INTRAMUSCULAR; INTRAVENOUS at 17:27

## 2021-10-24 NOTE — PROGRESS NOTE ADULT - SUBJECTIVE AND OBJECTIVE BOX
Patient seen and examined at bedside in no distress.  No complaints offered.    T(F): 97.8 (10-24-21 @ 05:23), Max: 98.2 (10-23-21 @ 17:07)  HR: 64 (10-24-21 @ 05:23) (64 - 80)  BP: 118/69 (10-24-21 @ 05:23) (118/69 - 153/61)  RR: 18 (10-24-21 @ 05:23) (17 - 18)  SpO2: 98% (10-24-21 @ 05:23) (97% - 99%)    PHYSICAL EXAM:  General: Awake, alert, NAD  CV: +S1S2 regular rate and rhythm  Lung: Respirations nonlabored  Abdomen: Soft, NTND  :  draining blood-tinged urine, no clots, 1200cc/24hrs    LABS:                        12.6   6.99  )-----------( 215      ( 24 Oct 2021 03:37 )             39.7     10-23    141  |  105  |  17  ----------------------------<  101<H>  4.0   |  31  |  0.96    Ca    9.4      23 Oct 2021 11:46  Mg     2.4     10-22      A/P: 89M PMH DVT on coumadin, HTN, HLD, OA, dementia present to ED with fall at home,  consulted for UR, c/b gross hematuria after  dislodged, now with blood tinged urine. H/h stable  - no acute  intervention  - continue , monitor UOP, monitor renal function  - TOV prior to d/c  - medical management

## 2021-10-24 NOTE — PHYSICAL THERAPY INITIAL EVALUATION ADULT - BALANCE TRAINING, PT EVAL
Pt will improve static/dynamic standing balance to WFL to perform all functional mobility without LOB and increase safety, by 3-4 weeks

## 2021-10-24 NOTE — PHYSICAL THERAPY INITIAL EVALUATION ADULT - DID THE PATIENT HAVE SURGERY?
This is the hx of A.A. a 89 y/o male patient who was admitted to [facility] due to complications of SOB, Urinary retention affecting medical condition and with subsequent affection on functional mobility./n/a

## 2021-10-24 NOTE — PROGRESS NOTE ADULT - SUBJECTIVE AND OBJECTIVE BOX
Patient is a 89y old  Male who presents with a chief complaint of fall (24 Oct 2021 08:15)      INTERVAL HPI/ OVERNIGHT EVENTS: Pt was seen and examined at bedside today, the patient continues to have gross hematuria, the patient denies any complaints, unclear if ROS is reliable      MEDICATIONS  (STANDING):  carvedilol 3.125 milliGRAM(s) Oral every 12 hours  cefTRIAXone   IVPB 1000 milliGRAM(s) IV Intermittent every 24 hours  escitalopram 10 milliGRAM(s) Oral daily  memantine 5 milliGRAM(s) Oral two times a day  phytonadione   Solution 2.5 milliGRAM(s) Oral once  polyethylene glycol 3350 17 Gram(s) Oral daily  senna 2 Tablet(s) Oral at bedtime    MEDICATIONS  (PRN):  acetaminophen     Tablet .. 650 milliGRAM(s) Oral every 6 hours PRN Mild Pain (1 - 3), Moderate Pain (4 - 6)  ALBUTerol    90 MICROgram(s) HFA Inhaler 1 Puff(s) Inhalation every 6 hours PRN Shortness of Breath and/or Wheezing  melatonin 3 milliGRAM(s) Oral at bedtime PRN Insomnia      Allergies    No Known Allergies    Intolerances        REVIEW OF SYSTEMS:    Unable to examine due to [ ] Encephalopathy [ ] Advanced Dementia [ ] Expressive Aphasia [ ] Non-verbal patient    CONSTITUTIONAL: No fever, NO generalized weakness/Fatigue, No weight loss  EYES: No eye pain, visual disturbances, or discharge  ENMT:  No difficulty hearing, tinnitus, vertigo; No sinus or throat pain  NECK: No pain or stiffness  RESPIRATORY: No shortness of breath,  cough, wheezing, sputum or hemoptysis   CARDIOVASCULAR: No chest pain, palpitations, or leg swelling  GASTROINTESTINAL: No abdominal pain. No nausea, vomiting, diarrhea or constipation. No melena or hematochezia.  GENITOURINARY: No dysuria, frequency, hematuria, or incontinence  NEUROLOGICAL: No headaches, Dizziness, memory loss, loss of strength, numbness, or tremors  SKIN: No itching, burning, rashes, or lesions   MUSCULOSKELETAL: No joint pain or swelling; No muscle, back, or extremity pain  PSYCHIATRIC: No depression, anxiety, mood swings, or difficulty sleeping  HEME/LYMPH: No easy bruising, or bleeding gums      Vital Signs Last 24 Hrs  T(C): 36.3 (24 Oct 2021 12:37), Max: 36.8 (23 Oct 2021 17:07)  T(F): 97.4 (24 Oct 2021 12:37), Max: 98.2 (23 Oct 2021 17:07)  HR: 65 (24 Oct 2021 12:37) (64 - 71)  BP: 118/58 (24 Oct 2021 12:37) (118/58 - 137/71)  BP(mean): --  RR: 18 (24 Oct 2021 12:37) (17 - 18)  SpO2: 98% (24 Oct 2021 12:37) (97% - 99%)    PHYSICAL EXAM:  GENERAL: NAD, well-developed, well-groomed  HEAD:  Atraumatic, Normocephalic  EYES: conjunctiva and sclera clear  ENMT: Moist mucous membranes  NECK: Supple, No JVD, Normal thyroid  CHEST/LUNG: Clear to Auscultation bilaterally; No rales, rhonchi, wheezing, or rubs  HEART: Regular rate and rhythm; No murmurs, rubs, or gallops  ABDOMEN: Soft, + suprapubic tenderness, Nondistended; Bowel sounds present  : indwelling Knapp, +gross hematuria   EXTREMITIES:  2+ Peripheral Pulses, No clubbing, cyanosis, or edema  SKIN: No rashes or lesions  NERVOUS SYSTEM:  Alert & Oriented X1,  Motor Strength 5/5 B/L upper and lower extremities    LABS:                        12.6   6.99  )-----------( 215      ( 24 Oct 2021 03:37 )             39.7     10-23    141  |  105  |  17  ----------------------------<  101<H>  4.0   |  31  |  0.96    Ca    9.4      23 Oct 2021 11:46    TPro  7.6  /  Alb  2.9<L>  /  TBili  0.9  /  DBili  x   /  AST  14<L>  /  ALT  21  /  AlkPhos  122<H>  10-23    PT/INR - ( 23 Oct 2021 21:10 )   PT: 34.7 sec;   INR: 3.16 ratio           Urinalysis Basic - ( 22 Oct 2021 14:39 )    Color: Yellow / Appearance: Clear / S.010 / pH: x  Gluc: x / Ketone: Negative  / Bili: Negative / Urobili: Negative mg/dL   Blood: x / Protein: Negative mg/dL / Nitrite: Negative   Leuk Esterase: Negative / RBC: 25-50 /HPF / WBC x   Sq Epi: x / Non Sq Epi: Occasional / Bacteria: x      CAPILLARY BLOOD GLUCOSE              RADIOLOGY & ADDITIONAL TESTS:          Imaging Personally Reviewed:  [ ] YES  [ ] NO    Consultant(s) Notes Reviewed:  [ ] YES  [ ] NO    Care Discussed with Consultants/Other Providers [x ] YES  [ ] NO

## 2021-10-24 NOTE — PHYSICAL THERAPY INITIAL EVALUATION ADULT - PERTINENT HX OF CURRENT PROBLEM, REHAB EVAL
This is the hx of A.A. a 89 y/o male patient who was admitted to [facility] due to complications of SOB, Urinary retention affecting medical condition and with subsequent affection on functional mobility. CT cervical spine 10/22/21: no Acute hemorrhage

## 2021-10-24 NOTE — PHYSICAL THERAPY INITIAL EVALUATION ADULT - GAIT TRAINING, PT EVAL
Improve gait balance to good, using cane or rolling walker with contact guard to supervision of 1 in 3-4 weeks

## 2021-10-24 NOTE — PHYSICAL THERAPY INITIAL EVALUATION ADULT - ADDITIONAL COMMENTS
as per previous chart: pt lives in  with daughter, pt has 6 steps to enter with B HR's, has 12 stairs to go to the second floor with B HR's. has HHA for 7 hours (M-F) and 4 hours (S-S).

## 2021-10-25 LAB
CULTURE RESULTS: SIGNIFICANT CHANGE UP
HCT VFR BLD CALC: 38 % — LOW (ref 39–50)
HGB BLD-MCNC: 12.1 G/DL — LOW (ref 13–17)
INR BLD: 1.6 RATIO — HIGH (ref 0.88–1.16)
MCHC RBC-ENTMCNC: 25.4 PG — LOW (ref 27–34)
MCHC RBC-ENTMCNC: 31.8 GM/DL — LOW (ref 32–36)
MCV RBC AUTO: 79.8 FL — LOW (ref 80–100)
NRBC # BLD: 0 /100 WBCS — SIGNIFICANT CHANGE UP (ref 0–0)
PLATELET # BLD AUTO: 247 K/UL — SIGNIFICANT CHANGE UP (ref 150–400)
PROTHROM AB SERPL-ACNC: 18.1 SEC — HIGH (ref 10.6–13.6)
RBC # BLD: 4.76 M/UL — SIGNIFICANT CHANGE UP (ref 4.2–5.8)
RBC # FLD: 17.3 % — HIGH (ref 10.3–14.5)
SPECIMEN SOURCE: SIGNIFICANT CHANGE UP
WBC # BLD: 6.62 K/UL — SIGNIFICANT CHANGE UP (ref 3.8–10.5)
WBC # FLD AUTO: 6.62 K/UL — SIGNIFICANT CHANGE UP (ref 3.8–10.5)

## 2021-10-25 PROCEDURE — 99232 SBSQ HOSP IP/OBS MODERATE 35: CPT

## 2021-10-25 RX ORDER — ENOXAPARIN SODIUM 100 MG/ML
40 INJECTION SUBCUTANEOUS DAILY
Refills: 0 | Status: DISCONTINUED | OUTPATIENT
Start: 2021-10-25 | End: 2021-10-26

## 2021-10-25 RX ADMIN — CARVEDILOL PHOSPHATE 3.12 MILLIGRAM(S): 80 CAPSULE, EXTENDED RELEASE ORAL at 17:37

## 2021-10-25 RX ADMIN — MEMANTINE HYDROCHLORIDE 5 MILLIGRAM(S): 10 TABLET ORAL at 17:38

## 2021-10-25 RX ADMIN — CARVEDILOL PHOSPHATE 3.12 MILLIGRAM(S): 80 CAPSULE, EXTENDED RELEASE ORAL at 05:37

## 2021-10-25 RX ADMIN — SENNA PLUS 2 TABLET(S): 8.6 TABLET ORAL at 21:52

## 2021-10-25 RX ADMIN — POLYETHYLENE GLYCOL 3350 17 GRAM(S): 17 POWDER, FOR SOLUTION ORAL at 11:52

## 2021-10-25 RX ADMIN — CEFTRIAXONE 100 MILLIGRAM(S): 500 INJECTION, POWDER, FOR SOLUTION INTRAMUSCULAR; INTRAVENOUS at 17:37

## 2021-10-25 RX ADMIN — ALBUTEROL 1 PUFF(S): 90 AEROSOL, METERED ORAL at 06:47

## 2021-10-25 RX ADMIN — ESCITALOPRAM OXALATE 10 MILLIGRAM(S): 10 TABLET, FILM COATED ORAL at 11:52

## 2021-10-25 RX ADMIN — MEMANTINE HYDROCHLORIDE 5 MILLIGRAM(S): 10 TABLET ORAL at 05:37

## 2021-10-25 NOTE — PROGRESS NOTE ADULT - ASSESSMENT
A/P: 89M PMH DVT on coumadin, HTN, HLD, OA, dementia present to ED with fall at home,  consulted for UR, c/b gross hematuria after  dislodged, now with blood tinged urine. H/h stable  - no acute  intervention  - F/u ToV today  - medical management   A/P: 89M PMH DVT on coumadin, HTN, HLD, OA, dementia present to ED with fall at home,  consulted for UR, c/b gross hematuria after  dislodged, now with blood tinged urine. H/h stable  - no acute  intervention  - F/u ToV today  - medical management      Urology to sign off, please reach out with any further questions

## 2021-10-25 NOTE — PROGRESS NOTE ADULT - PROBLEM SELECTOR PLAN 8
DVTp: therapeutic INR   Disposition: BOB     Call made to family; no answer  left DVTp: Lovenox   Disposition: BOB     Call made to family; no answer  left

## 2021-10-25 NOTE — PROGRESS NOTE ADULT - SUBJECTIVE AND OBJECTIVE BOX
Patient seen and examined bedside resting comfortably.  No complaints offered.   Currently undergoing ToV      T(F): 98.3 (10-25-21 @ 11:39), Max: 98.7 (10-24-21 @ 23:58)  HR: 82 (10-25-21 @ 11:39) (69 - 82)  BP: 109/64 (10-25-21 @ 11:39) (109/64 - 127/71)  RR: 18 (10-25-21 @ 11:39) (17 - 18)  SpO2: 98% (10-25-21 @ 11:39) (95% - 99%)    PHYSICAL EXAM:    General: NAD, alert and awake  HEENT: NCAT, EOMI, conjunctiva clear  Chest: nonlabored respirations, CTA b/l.  Abdomen: soft, NT/ND.   Extremities: Calf soft, nontender b/l.   : No suprapubic tenderness or bladder distention.  Currently undergoing ToV    LABS:                        12.1   6.62  )-----------( 247      ( 25 Oct 2021 08:13 )             38.0       PT/INR - ( 25 Oct 2021 11:05 )   PT: 18.1 sec;   INR: 1.60 ratio           I&O's Detail    24 Oct 2021 07:01  -  25 Oct 2021 07:00  --------------------------------------------------------  IN:    Oral Fluid: 720 mL  Total IN: 720 mL    OUT:    Indwelling Catheter - Urethral (mL): 400 mL  Total OUT: 400 mL    Total NET: 320 mL      25 Oct 2021 07:01  -  25 Oct 2021 12:45  --------------------------------------------------------  IN:    Oral Fluid: 400 mL  Total IN: 400 mL    OUT:  Total OUT: 0 mL    Total NET: 400 mL

## 2021-10-25 NOTE — PROGRESS NOTE ADULT - SUBJECTIVE AND OBJECTIVE BOX
Patient is a 89y old  Male who presents with a chief complaint of fall (25 Oct 2021 12:44)      INTERVAL HPI/ OVERNIGHT EVENTS: Pt was seen and examined at bedside today, Indwelling Knapp removed this morning for TOV, pt denies any complaints today      MEDICATIONS  (STANDING):  carvedilol 3.125 milliGRAM(s) Oral every 12 hours  cefTRIAXone   IVPB 1000 milliGRAM(s) IV Intermittent every 24 hours  enoxaparin Injectable 40 milliGRAM(s) SubCutaneous daily  escitalopram 10 milliGRAM(s) Oral daily  memantine 5 milliGRAM(s) Oral two times a day  polyethylene glycol 3350 17 Gram(s) Oral daily  senna 2 Tablet(s) Oral at bedtime    MEDICATIONS  (PRN):  acetaminophen     Tablet .. 650 milliGRAM(s) Oral every 6 hours PRN Mild Pain (1 - 3), Moderate Pain (4 - 6)  ALBUTerol    90 MICROgram(s) HFA Inhaler 1 Puff(s) Inhalation every 6 hours PRN Shortness of Breath and/or Wheezing  melatonin 3 milliGRAM(s) Oral at bedtime PRN Insomnia      Allergies    No Known Allergies    Intolerances        REVIEW OF SYSTEMS:    Unable to examine due to [ ] Encephalopathy [ ] Advanced Dementia [ ] Expressive Aphasia [ ] Non-verbal patient    CONSTITUTIONAL: No fever, NO generalized weakness/Fatigue, No weight loss  EYES: No eye pain, visual disturbances, or discharge  ENMT:  No difficulty hearing, tinnitus, vertigo; No sinus or throat pain  NECK: No pain or stiffness  RESPIRATORY: No shortness of breath,  cough, wheezing, sputum or hemoptysis   CARDIOVASCULAR: No chest pain, palpitations, or leg swelling  GASTROINTESTINAL: No abdominal pain. No nausea, vomiting, diarrhea or constipation. No melena or hematochezia.  GENITOURINARY: No dysuria, frequency, hematuria, or incontinence  NEUROLOGICAL: No headaches, Dizziness, memory loss, loss of strength, numbness, or tremors  SKIN: No itching, burning, rashes, or lesions   MUSCULOSKELETAL: No joint pain or swelling; No muscle, back, or extremity pain  PSYCHIATRIC: No depression, anxiety, mood swings, or difficulty sleeping  HEME/LYMPH: No easy bruising, or bleeding gums      Vital Signs Last 24 Hrs  T(C): 36.8 (25 Oct 2021 11:39), Max: 37.1 (24 Oct 2021 23:58)  T(F): 98.3 (25 Oct 2021 11:39), Max: 98.7 (24 Oct 2021 23:58)  HR: 82 (25 Oct 2021 11:39) (69 - 82)  BP: 109/64 (25 Oct 2021 11:39) (109/64 - 123/79)  BP(mean): --  RR: 18 (25 Oct 2021 11:39) (17 - 18)  SpO2: 98% (25 Oct 2021 11:39) (95% - 98%)    PHYSICAL EXAM:  GENERAL: NAD, well-developed, well-groomed  HEAD:  Atraumatic, Normocephalic  EYES: conjunctiva and sclera clear  ENMT: Moist mucous membranes  NECK: Supple, No JVD, Normal thyroid  CHEST/LUNG: Clear to Auscultation bilaterally; No rales, rhonchi, wheezing, or rubs  HEART: Regular rate and rhythm; No murmurs, rubs, or gallops  ABDOMEN: Soft, No tenderness, Nondistended; Bowel sounds present  EXTREMITIES:  2+ Peripheral Pulses, No clubbing, cyanosis, or edema  SKIN: No rashes or lesions  NERVOUS SYSTEM:  Alert & Oriented X1,  Motor Strength 5/5 B/L upper and lower extremities    LABS:                        12.1   6.62  )-----------( 247      ( 25 Oct 2021 08:13 )             38.0           PT/INR - ( 25 Oct 2021 11:05 )   PT: 18.1 sec;   INR: 1.60 ratio             CAPILLARY BLOOD GLUCOSE            Culture - Urine (collected 10-24-21)  Source: Catheterized Catheterized  Final Report (10-25-21):    <10,000 CFU/mL Normal Urogenital Brit        RADIOLOGY & ADDITIONAL TESTS:          Imaging Personally Reviewed:  [ ] YES  [ ] NO    Consultant(s) Notes Reviewed:  [ ] YES  [ ] NO    Care Discussed with Consultants/Other Providers [x ] YES  [ ] NO

## 2021-10-26 LAB
RAPID RVP RESULT: SIGNIFICANT CHANGE UP
SARS-COV-2 RNA SPEC QL NAA+PROBE: SIGNIFICANT CHANGE UP

## 2021-10-26 PROCEDURE — 99232 SBSQ HOSP IP/OBS MODERATE 35: CPT

## 2021-10-26 RX ORDER — ENOXAPARIN SODIUM 100 MG/ML
40 INJECTION SUBCUTANEOUS DAILY
Refills: 0 | Status: DISCONTINUED | OUTPATIENT
Start: 2021-10-26 | End: 2021-10-27

## 2021-10-26 RX ADMIN — MEMANTINE HYDROCHLORIDE 5 MILLIGRAM(S): 10 TABLET ORAL at 18:16

## 2021-10-26 RX ADMIN — SENNA PLUS 2 TABLET(S): 8.6 TABLET ORAL at 21:10

## 2021-10-26 RX ADMIN — ESCITALOPRAM OXALATE 10 MILLIGRAM(S): 10 TABLET, FILM COATED ORAL at 11:16

## 2021-10-26 RX ADMIN — CARVEDILOL PHOSPHATE 3.12 MILLIGRAM(S): 80 CAPSULE, EXTENDED RELEASE ORAL at 18:16

## 2021-10-26 RX ADMIN — MEMANTINE HYDROCHLORIDE 5 MILLIGRAM(S): 10 TABLET ORAL at 05:26

## 2021-10-26 RX ADMIN — ENOXAPARIN SODIUM 40 MILLIGRAM(S): 100 INJECTION SUBCUTANEOUS at 11:16

## 2021-10-26 RX ADMIN — POLYETHYLENE GLYCOL 3350 17 GRAM(S): 17 POWDER, FOR SOLUTION ORAL at 11:16

## 2021-10-26 NOTE — PROGRESS NOTE ADULT - PROBLEM SELECTOR PLAN 3
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  Urology consult appreciated   TOV prior to discharge
s/p Knapp placement   Urology consult appreciated   TOV prior to discharge
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  Urology consult appreciated   Knapp d/c in am, f/u TOV
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  Urology consult appreciated   resolved, TOV passed

## 2021-10-26 NOTE — PROGRESS NOTE ADULT - SUBJECTIVE AND OBJECTIVE BOX
Patient is a 89y old  Male who presents with a chief complaint of fall (25 Oct 2021 13:37)      INTERVAL HPI/ OVERNIGHT EVENTS: Pt was seen and examined at bedside today, pt continues to have mild blood tinge urine thats improving daily,  pt denies any complaints.      MEDICATIONS  (STANDING):  carvedilol 3.125 milliGRAM(s) Oral every 12 hours  enoxaparin Injectable 40 milliGRAM(s) SubCutaneous daily  escitalopram 10 milliGRAM(s) Oral daily  memantine 5 milliGRAM(s) Oral two times a day  polyethylene glycol 3350 17 Gram(s) Oral daily  senna 2 Tablet(s) Oral at bedtime    MEDICATIONS  (PRN):  acetaminophen     Tablet .. 650 milliGRAM(s) Oral every 6 hours PRN Mild Pain (1 - 3), Moderate Pain (4 - 6)  ALBUTerol    90 MICROgram(s) HFA Inhaler 1 Puff(s) Inhalation every 6 hours PRN Shortness of Breath and/or Wheezing  melatonin 3 milliGRAM(s) Oral at bedtime PRN Insomnia      Allergies    No Known Allergies    Intolerances        REVIEW OF SYSTEMS:    Unable to examine due to [ ] Encephalopathy [ ] Advanced Dementia [ ] Expressive Aphasia [ ] Non-verbal patient    CONSTITUTIONAL: No fever, NO generalized weakness/Fatigue, No weight loss  EYES: No eye pain, visual disturbances, or discharge  ENMT:  No difficulty hearing, tinnitus, vertigo; No sinus or throat pain  NECK: No pain or stiffness  RESPIRATORY: No shortness of breath,  cough, wheezing, sputum or hemoptysis   CARDIOVASCULAR: No chest pain, palpitations, or leg swelling  GASTROINTESTINAL: No abdominal pain. No nausea, vomiting, diarrhea or constipation. No melena or hematochezia.  GENITOURINARY: No dysuria, frequency, hematuria, or incontinence  NEUROLOGICAL: No headaches, Dizziness, memory loss, loss of strength, numbness, or tremors  SKIN: No itching, burning, rashes, or lesions   MUSCULOSKELETAL: No joint pain or swelling; No muscle, back, or extremity pain  PSYCHIATRIC: No depression, anxiety, mood swings, or difficulty sleeping  HEME/LYMPH: No easy bruising, or bleeding gums      Vital Signs Last 24 Hrs  T(C): 36.4 (26 Oct 2021 12:02), Max: 36.9 (25 Oct 2021 17:02)  T(F): 97.6 (26 Oct 2021 12:02), Max: 98.4 (25 Oct 2021 17:02)  HR: 65 (26 Oct 2021 12:15) (65 - 98)  BP: 128/64 (26 Oct 2021 12:02) (100/55 - 139/67)  BP(mean): --  RR: 18 (26 Oct 2021 12:15) (17 - 19)  SpO2: 100% (26 Oct 2021 12:15) (98% - 100%)    PHYSICAL EXAM:  GENERAL: NAD, well-developed, well-groomed  HEAD:  Atraumatic, Normocephalic  EYES: conjunctiva and sclera clear  ENMT: Moist mucous membranes  NECK: Supple, No JVD, Normal thyroid  CHEST/LUNG: Clear to Auscultation bilaterally; No rales, rhonchi, wheezing, or rubs  HEART: Regular rate and rhythm; No murmurs, rubs, or gallops  ABDOMEN: Soft, No tenderness, Nondistended; Bowel sounds present  EXTREMITIES:  2+ Peripheral Pulses, No clubbing, cyanosis, or edema  SKIN: No rashes or lesions  NERVOUS SYSTEM:  Alert & Oriented X1,  Motor Strength 5/5 B/L upper and lower extremities        LABS:                        12.1   6.62  )-----------( 247      ( 25 Oct 2021 08:13 )             38.0           PT/INR - ( 25 Oct 2021 11:05 )   PT: 18.1 sec;   INR: 1.60 ratio             CAPILLARY BLOOD GLUCOSE            Culture - Urine (collected 10-24-21)  Source: Catheterized Catheterized  Final Report (10-25-21):    <10,000 CFU/mL Normal Urogenital Brit        RADIOLOGY & ADDITIONAL TESTS:          Imaging Personally Reviewed:  [ ] YES  [ ] NO    Consultant(s) Notes Reviewed:  [ ] YES  [ ] NO    Care Discussed with Consultants/Other Providers [x ] YES  [ ] NO

## 2021-10-26 NOTE — PROGRESS NOTE ADULT - PROBLEM SELECTOR PLAN 4
Gross hematuria in the setting of indwelling  (pt reportedly was pulling on )   H/H stable thus far   Urology on board, cont w/ IV Rocephin and    +supratherapeutic INR; s/p vitamin K, INR now 1.6
Gross hematuria in the setting of indwelling  (pt reportedly was pulling on )   H/H stable thus far   Urology on board, cont w/ IV Rocephin and    +supratherapeutic INR; s/p vitamin K, INR now 1.6 resolved   improving, cont to monitor bleeding
Gross hematuria in the setting of indwelling  (pt reportedly was pulling on ) and therapeutic INR   Urology on board, started on IV Rocephin   hold coumadin for now, monitor H/H
Gross hematuria in the setting of indwelling  (pt reportedly was pulling on )   H/H stable thus far   Urology on board, cont w/ IV Rocephin and    +supratherapeutic INR; will give vitamin K and monitor CBC/INR and bleeding

## 2021-10-26 NOTE — PROGRESS NOTE ADULT - PROBLEM SELECTOR PROBLEM 5
History of DVT (deep vein thrombosis)

## 2021-10-26 NOTE — PROGRESS NOTE ADULT - PROBLEM SELECTOR PLAN 7
Continue memantine, and Lexapro  cont supportive care

## 2021-10-26 NOTE — PROGRESS NOTE ADULT - PROBLEM SELECTOR PLAN 2
s/p Albuterol and prednisone 50mg in ED  pt denies SOB and is tolerating room, no wheezing on exam now   cont albuterol prn and will monitor

## 2021-10-26 NOTE — PROGRESS NOTE ADULT - PROBLEM SELECTOR PLAN 1
CT head with no acute pathology.   CT C spine with no fracture.   CT chest with no acute finding. Small bilateral pleural effusions  BOB recommended   cont w/ Fall precaution
CT head with no acute pathology.   CT C spine with no fracture.   CT chest with no acute finding. Small bilateral pleural effusions  f/u w/ PT consult  cont w/ Fall precaution
CT head with no acute pathology.   CT C spine with no fracture.   CT chest with no acute finding. Small bilateral pleural effusions  f/u w/ PT consult  cont w/ Fall precaution
CT head with no acute pathology.   CT C spine with no fracture.   CT chest with no acute finding. Small bilateral pleural effusions  BOB recommended   cont w/ Fall precaution

## 2021-10-26 NOTE — PROGRESS NOTE ADULT - PROBLEM SELECTOR PROBLEM 7
Unspecified dementia without behavioral disturbance

## 2021-10-26 NOTE — PROGRESS NOTE ADULT - PROBLEM SELECTOR PLAN 5
supratherapeutic INR, cont to hold coumadin
INR 3  Hold coumadin for now with gross hematuria, will resume when bleeding has ceased
will get collateral information from family in regards to resuming AC
continue with lovenox prophylaxis for now   resume full anticoagulation when hematuria resolves.

## 2021-10-26 NOTE — PROGRESS NOTE ADULT - PROBLEM SELECTOR PROBLEM 4
Acute blood loss anemia (ABLA)

## 2021-10-27 ENCOUNTER — TRANSCRIPTION ENCOUNTER (OUTPATIENT)
Age: 86
End: 2021-10-27

## 2021-10-27 VITALS
OXYGEN SATURATION: 98 % | TEMPERATURE: 98 F | DIASTOLIC BLOOD PRESSURE: 68 MMHG | RESPIRATION RATE: 18 BRPM | HEART RATE: 67 BPM | SYSTOLIC BLOOD PRESSURE: 136 MMHG

## 2021-10-27 PROCEDURE — 99239 HOSP IP/OBS DSCHRG MGMT >30: CPT

## 2021-10-27 RX ORDER — POLYETHYLENE GLYCOL 3350 17 G/17G
17 POWDER, FOR SOLUTION ORAL
Qty: 0 | Refills: 0 | DISCHARGE
Start: 2021-10-27

## 2021-10-27 RX ORDER — ALBUTEROL 90 UG/1
1 AEROSOL, METERED ORAL
Qty: 0 | Refills: 0 | DISCHARGE
Start: 2021-10-27

## 2021-10-27 RX ORDER — LANOLIN ALCOHOL/MO/W.PET/CERES
1 CREAM (GRAM) TOPICAL
Qty: 0 | Refills: 0 | DISCHARGE
Start: 2021-10-27

## 2021-10-27 RX ORDER — MEMANTINE HYDROCHLORIDE 10 MG/1
1 TABLET ORAL
Qty: 0 | Refills: 0 | DISCHARGE

## 2021-10-27 RX ORDER — ESCITALOPRAM OXALATE 10 MG/1
2 TABLET, FILM COATED ORAL
Qty: 0 | Refills: 0 | DISCHARGE

## 2021-10-27 RX ORDER — ESCITALOPRAM OXALATE 10 MG/1
1 TABLET, FILM COATED ORAL
Qty: 0 | Refills: 0 | DISCHARGE
Start: 2021-10-27

## 2021-10-27 RX ORDER — MEMANTINE HYDROCHLORIDE 10 MG/1
1 TABLET ORAL
Qty: 0 | Refills: 0 | DISCHARGE
Start: 2021-10-27

## 2021-10-27 RX ORDER — BNT162B2 0.23 MG/2.25ML
0.3 INJECTION, SUSPENSION INTRAMUSCULAR ONCE
Refills: 0 | Status: COMPLETED | OUTPATIENT
Start: 2021-10-27 | End: 2021-10-27

## 2021-10-27 RX ADMIN — POLYETHYLENE GLYCOL 3350 17 GRAM(S): 17 POWDER, FOR SOLUTION ORAL at 11:16

## 2021-10-27 RX ADMIN — MEMANTINE HYDROCHLORIDE 5 MILLIGRAM(S): 10 TABLET ORAL at 05:43

## 2021-10-27 RX ADMIN — CARVEDILOL PHOSPHATE 3.12 MILLIGRAM(S): 80 CAPSULE, EXTENDED RELEASE ORAL at 17:33

## 2021-10-27 RX ADMIN — ENOXAPARIN SODIUM 40 MILLIGRAM(S): 100 INJECTION SUBCUTANEOUS at 11:16

## 2021-10-27 RX ADMIN — BNT162B2 0.3 MILLILITER(S): 0.23 INJECTION, SUSPENSION INTRAMUSCULAR at 17:40

## 2021-10-27 RX ADMIN — MEMANTINE HYDROCHLORIDE 5 MILLIGRAM(S): 10 TABLET ORAL at 17:33

## 2021-10-27 RX ADMIN — CARVEDILOL PHOSPHATE 3.12 MILLIGRAM(S): 80 CAPSULE, EXTENDED RELEASE ORAL at 05:43

## 2021-10-27 RX ADMIN — ESCITALOPRAM OXALATE 10 MILLIGRAM(S): 10 TABLET, FILM COATED ORAL at 11:16

## 2021-10-27 NOTE — DISCHARGE NOTE PROVIDER - HOSPITAL COURSE
89 years old male patient with h/o DVT on coumadin, HTN, HLD, OA, dementia present to ED with fall at home.  Hemodynamically stable, afebrile and sat well at RA in ED. No leukocytosis, Hb 12.5, Plt 241, INR 3, Cr 1, K 4.7, glucose 144, trop negative, proBNP 280. UA negative for infection. CT head with no acute pathology. CT C spine with no fracture. CT chest with no acute finding. Small bilateral pleural effusions. Reportedly noted wheezing in triage, improve with albuterol inhaler. Patient was also given prednisone 50mg in ED  Admitted with fall.     Problem/Plan - 1:  ·  Problem: Fall.   ·  Plan: CT head with no acute pathology.   CT C spine with no fracture.   CT chest with no acute finding. Small bilateral pleural effusions  BOB recommended   cont w/ Fall precaution.    Problem/Plan - 2:  ·  Problem: Wheezing.   ·  Plan: s/p Albuterol and prednisone 50mg in ED  pt denies SOB and is tolerating room, no wheezing on exam now   cont albuterol prn and will monitor.    Problem/Plan - 3:  ·  Problem: Urinary retention.   ·  Plan: s/p  placement, +gross hematuria   CT Renal Stone Hunt:  catheter balloon now positioned within the prosthetic urethra.  Urology consult appreciated   resolved, TOV passed.    Problem/Plan - 4:  ·  Problem: Acute blood loss anemia (ABLA).   ·  Plan: Gross hematuria in the setting of indwelling  (pt reportedly was pulling on )   H/H stable thus far   Urology on board, cont w/ IV Rocephin and    +supratherapeutic INR; s/p vitamin K, INR now 1.6 resolved   improving, cont to monitor bleeding.    Problem/Plan - 5:  ·  Problem: History of DVT (deep vein thrombosis).   ·  Plan: continue with lovenox prophylaxis for now   resume full anticoagulation when hematuria resolves.     89 years old male patient with h/o DVT on coumadin, HTN, HLD, OA, dementia present to ED with fall at home.  Hemodynamically stable, afebrile and sat well at RA in ED. No leukocytosis, Hb 12.5, Plt 241, INR 3, Cr 1, K 4.7, glucose 144, trop negative, proBNP 280. UA negative for infection. CT head with no acute pathology. CT C spine with no fracture. CT chest with no acute finding. Small bilateral pleural effusions. Reportedly noted wheezing in triage, improve with albuterol inhaler. Patient was also given prednisone 50mg in ED  Admitted with fall.     Problem/Plan - 1:  ·  Problem: Fall.   ·  Plan: CT head with no acute pathology.   CT C spine with no fracture.   CT chest with no acute finding. Small bilateral pleural effusions  BOB recommended   cont w/ Fall precaution.    Problem/Plan - 2:  ·  Problem: Wheezing.   ·  Plan: s/p Albuterol and prednisone 50mg in ED. wheezing resolved. continue with albuterol inhaler    Problem/Plan - 3:  ·  Problem: Urinary retention.   ·  Plan: s/p  placement, +gross hematuria   CT Renal Stone Hunt:  catheter balloon now positioned within the prosthetic urethra.  Urology consult appreciated   resolved, TOV passed.    Problem/Plan - 4:  ·  Problem: Acute blood loss anemia (ABLA).   ·  Plan: Gross hematuria in the setting of indwelling  (pt reportedly was pulling on )   H/H stable thus far. +supratherapeutic INR; s/p vitamin K, INR now 1.6 resolved   improving, cont to monitor bleeding.    Problem/Plan - 5:  ·  Problem: History of DVT (deep vein thrombosis).   ·  Plan: continue with lovenox prophylaxis for now   resume full anticoagulation when hematuria resolves.     89 years old male patient with h/o DVT on coumadin, HTN, HLD, OA, dementia present to ED with fall at home.  Hemodynamically stable, afebrile and sat well at RA in ED. No leukocytosis, Hb 12.5, Plt 241, INR 3, Cr 1, K 4.7, glucose 144, trop negative, proBNP 280. UA negative for infection. CT head with no acute pathology. CT C spine with no fracture. CT chest with no acute finding. Small bilateral pleural effusions. Reportedly noted wheezing in triage, improve with albuterol inhaler. Patient was also given prednisone 50mg in ED  Admitted with fall.     Problem/Plan - 1:  ·  Problem: Fall.   ·  Plan: CT head with no acute pathology.   CT C spine with no fracture.   CT chest with no acute finding. Small bilateral pleural effusions  BOB recommended   cont w/ Fall precaution.    Problem/Plan - 2:  ·  Problem: Wheezing.   ·  Plan: s/p Albuterol and prednisone 50mg in ED. wheezing resolved. continue with albuterol inhaler    Problem/Plan - 3:  ·  Problem: Urinary retention.   ·  Plan: s/p  placement, +gross hematuria   CT Renal Stone Hunt:  catheter balloon now positioned within the prosthetic urethra.  Urology consult appreciated   resolved hematuria- TOV passed.    Problem/Plan - 4:  ·  Problem: mild   small drop in hgb  yet stable at 12's  mild anemia   ·  Plan: Gross hematuria in the setting of indwelling  (pt reportedly was pulling on )   H/H stable thus far. +supratherapeutic INR; s/p vitamin K, INR now 1.6 resolved   improving, cont to monitor bleeding.    Problem/Plan - 5:  ·  Problem: History of DVT (deep vein thrombosis). but ha an IVC filter present indicated in ct scan imaging  given h/o falls and recent gross Hematuria- will not resume AC ( coumadin) at this time . risks out weigh beneift    TIME SPENT COMPLETING DISCHARGE AND COORDINATING CARE WITH NURSING,  AND CASE MANAGEMENT APPROX 40MINUTES   to [facility]

## 2021-10-27 NOTE — DISCHARGE NOTE PROVIDER - NSDCMRMEDTOKEN_GEN_ALL_CORE_FT
carvedilol 3.125 mg oral tablet: 1 tab(s) orally every 12 hours  docusate sodium 100 mg oral capsule: 1 cap(s) orally 3 times a day  doxycycline hyclate 100 mg oral capsule: 1 cap(s) orally 2 times a day x7 days  escitalopram 5 mg oral tablet: 2 tab(s) orally once a day  furosemide 20 mg oral tablet: 1 tab(s) orally once a day  melatonin 3 mg oral tablet: 1 tab(s) orally once a day (at bedtime), As needed, Insomnia  memantine 5 mg oral tablet: 1 tab(s) orally 2 times a day  piperacillin-tazobactam: 3.375 gram(s) intravenous every 8 hours  senna oral tablet: 2 tab(s) orally once a day (at bedtime)  traMADol 50 mg oral tablet: 1 tab(s) orally 2 times a day, As needed, Moderate Pain  warfarin 7.5 mg oral tablet: 1 tab(s) orally once   acetaminophen 325 mg oral tablet: 2 tab(s) orally every 6 hours, As needed, Mild Pain (1 - 3), Moderate Pain (4 - 6)  albuterol 90 mcg/inh inhalation aerosol: 1 puff(s) inhaled every 6 hours, As needed, Shortness of Breath and/or Wheezing  carvedilol 3.125 mg oral tablet: 1 tab(s) orally every 12 hours  escitalopram 10 mg oral tablet: 1 tab(s) orally once a day  melatonin 3 mg oral tablet: 1 tab(s) orally once a day (at bedtime), As needed, Insomnia  memantine 5 mg oral tablet: 1 tab(s) orally 2 times a day  polyethylene glycol 3350 oral powder for reconstitution: 17 gram(s) orally once a day  senna oral tablet: 2 tab(s) orally once a day (at bedtime)  warfarin 7.5 mg oral tablet: 1 tab(s) orally once   acetaminophen 325 mg oral tablet: 2 tab(s) orally every 6 hours, As needed, Mild Pain (1 - 3), Moderate Pain (4 - 6)  albuterol 90 mcg/inh inhalation aerosol: 1 puff(s) inhaled every 6 hours, As needed, Shortness of Breath and/or Wheezing  carvedilol 3.125 mg oral tablet: 1 tab(s) orally every 12 hours  escitalopram 10 mg oral tablet: 1 tab(s) orally once a day  melatonin 3 mg oral tablet: 1 tab(s) orally once a day (at bedtime), As needed, Insomnia  memantine 5 mg oral tablet: 1 tab(s) orally 2 times a day  polyethylene glycol 3350 oral powder for reconstitution: 17 gram(s) orally once a day  senna oral tablet: 2 tab(s) orally once a day (at bedtime)

## 2021-10-27 NOTE — DISCHARGE NOTE PROVIDER - NSDCCPCAREPLAN_GEN_ALL_CORE_FT
PRINCIPAL DISCHARGE DIAGNOSIS  Diagnosis: Shortness of breath  Assessment and Plan of Treatment: wheezing resolved after reciving albuterol and prednisone in the ER. Continue with albuterol inhaler as needed      SECONDARY DISCHARGE DIAGNOSES  Diagnosis: Fall  Assessment and Plan of Treatment: discharge to rehab    Diagnosis: Acute blood loss anemia (ABLA)  Assessment and Plan of Treatment: secondary to patient dislodging  catheter. Hgb 12.1 stable. No further bleeding. did not need any blood transfusions. continue to monitor    Diagnosis: Wheezing  Assessment and Plan of Treatment: resolved    Diagnosis: Unspecified dementia without behavioral disturbance  Assessment and Plan of Treatment: supportive care    Diagnosis: Urinary retention  Assessment and Plan of Treatment: resolved. voiding without catheter    Diagnosis: DVT, lower extremity  Assessment and Plan of Treatment: anticoagulation was held secondary to hematuria. now resolved.     PRINCIPAL DISCHARGE DIAGNOSIS  Diagnosis: Shortness of breath  Assessment and Plan of Treatment: wheezing resolved after reciving albuterol and prednisone in the ER. Continue with albuterol inhaler as needed      SECONDARY DISCHARGE DIAGNOSES  Diagnosis: Urinary retention  Assessment and Plan of Treatment: resolved. voiding without catheter    Diagnosis: Fall  Assessment and Plan of Treatment: discharge to rehab    Diagnosis: Wheezing  Assessment and Plan of Treatment: resolved    Diagnosis: Unspecified dementia without behavioral disturbance  Assessment and Plan of Treatment: supportive care    Diagnosis: Acute blood loss anemia (ABLA)  Assessment and Plan of Treatment: secondary to patient dislodging  catheter. Hgb 12.1 stable. No further bleeding. did not need any blood transfusions. continue to monitor    Diagnosis: DVT, lower extremity  Assessment and Plan of Treatment: anticoagulation was held secondary to hematuria. now resolved.    Diagnosis: Presence of IVC filter  Assessment and Plan of Treatment:

## 2021-10-27 NOTE — DISCHARGE NOTE NURSING/CASE MANAGEMENT/SOCIAL WORK - NSDCPEFALRISK_GEN_ALL_CORE
For information on Fall & injury Prevention, visit [email or web address]

## 2021-10-27 NOTE — CHART NOTE - NSCHARTNOTEFT_GEN_A_CORE
Verbal consent obtained by Tete Owens, daughter for Pfizer Covid 19 Vaccine, first dose.  Prescreening Questionnaire completed, risks and benefits discussed and daughter consented to patient having vaccine.
Medicine Hospitalist PA    Requested by RN to place an IV heplock in patient. As per RN pt is a difficult stick. Placed IV heplock in Left antecub. #20g using ultrasound guidance. Also obtained blood sample via IV, blood tubes sent. Labeled at bedside. IV flushed and secured. RN aware.

## 2021-10-27 NOTE — DISCHARGE NOTE NURSING/CASE MANAGEMENT/SOCIAL WORK - PATIENT PORTAL LINK FT
You can access the FollowMyHealth Patient Portal offered by [facility] by registering at the following website: [email or web address]. By joining [facility]’s FollowMyHealth portal, you will also be able to view your health information using other applications (apps) compatible with our system.

## 2021-10-27 NOTE — DISCHARGE NOTE NURSING/CASE MANAGEMENT/SOCIAL WORK - NSDCVIVACCINE_GEN_ALL_CORE_FT
Influenza, seasonal, injectable; 05-Jan-2014 10:59; Jose Juan Medina (RN); [id number]; IntraMuscular; Deltoid Left.; 0.5 milliLiter(s);   influenza, injectable, quadrivalent, preservative free; 10-Dec-2014 16:19; Crystal Treadwell (RN); [employer]; ; IntraMuscular; Deltoid Right.; 0.5 milliLiter(s);   influenza, injectable, quadrivalent, preservative free; 04-Oct-2019 13:43; Pollo De La Vega (RN); Immunovaccine;  (Exp. Date: 30-Jun-2020); IntraMuscular; Deltoid Left.; 0.5 milliLiter(s); VIS (VIS Published: 15-Aug-2019, VIS Presented: 04-Oct-2019);   pneumococcal polysaccharide PPV23; 05-Jan-2014 10:59; Jose Juan Medina (CHARLENE); [id number]; IntraMuscular; Deltoid Right.; 0.5 milliLiter(s);

## 2021-11-28 ENCOUNTER — OUTPATIENT (OUTPATIENT)
Dept: OUTPATIENT SERVICES | Facility: HOSPITAL | Age: 86
LOS: 1 days | Discharge: HOME | End: 2021-11-28
Payer: MEDICARE

## 2021-11-28 DIAGNOSIS — I83.813 VARICOSE VEINS OF BILATERAL LOWER EXTREMITIES WITH PAIN: ICD-10-CM

## 2021-11-28 DIAGNOSIS — Z98.89 OTHER SPECIFIED POSTPROCEDURAL STATES: Chronic | ICD-10-CM

## 2021-11-28 PROCEDURE — 93971 EXTREMITY STUDY: CPT | Mod: 26,RT

## 2022-02-17 NOTE — PHYSICAL THERAPY INITIAL EVALUATION ADULT - IMPAIRED TRANSFERS: SIT/STAND, REHAB EVAL
5 impaired balance/decreased strength/narrow base of support/pain/dyspnea, intention tremors to both upper limbs

## 2022-05-22 NOTE — H&P ADULT - HISTORY OF PRESENT ILLNESS
Patient is a 89M with a PMH of dementia, DVT s/p IVC filter, HTN, HLD, and OA who presents to ED for abdominal pain from today.  Patient is AAOx2 (person and place) but can answer simple questions.  Patient complains of pain in the R sided of his abdomen.  US shows signs for acute cholecystitis, patient evaled by surgical team who deemed him not a candidate for cholecystectomy.  Suggest cholecystomy by IR. show

## 2022-08-04 NOTE — ED ADULT TRIAGE NOTE - WEIGHT IN KG
[facility] CARDIOLOGY  [address], [address]  PHONE: [phone]    22    NAME:  Chrissy Giraldo  : 1975  MRN: [id number]         SUBJECTIVE:   Chrissy Giraldo is a 55 y.o. male seen for a visit regarding the following:     Chief Complaint   Patient presents with    Hypertension     4 week follow up     Results     Echo & Lab results       HPI:      Prior history of a bicuspid valve status post 1vCABG (SVG to mRCA-appears done emergently for post surgical VF)/AVR from 2012. Per records, patient adamantly refused mechanical valve and hence a bioprosthetic valve was selected. Also Hodgkins lymphona hx (adriamycin was part of regimen~; no radiation). Echo from 2022 with preserved EF; states aortic prosthesis not well visualized but likely adequately functioning based on hemodynamics [AT 90 ms; DI 0.53]. Much improved BP on losartan. Improved dizziness/dyspnea; can walk a few miles with no issues. Elevated BPs noted at home when checking for increased dyspnea/dizziness. Subsequently set up appointment due to elevated blood pressures and noted symptoms. States symptoms of dyspnea/dizziness have been sporadic. No exertional symptoms. Active at baseline with no issues. Denies any chest discomfort. Can walk over a few miles with no issues. Was prior given anti-HTN regimen and stopped taking it with cough; uncertain of details. AVR-controlled, hypertension-controlled, hyperlipidemia-controlled    Past Medical History, Past Surgical History, Family history, Social History, and Medications were all reviewed with the patient today and updated as necessary.      No Known Allergies  Patient Active Problem List   Diagnosis    Congenital aortic valve stenosis    Family history of premature CAD    Substance abuse ([facility]    S/P AVR (aortic valve replacement)    GERD (gastroesophageal reflux disease)    Thrombocytopenia due to blood loss    Postoperative anemia due to acute blood loss    Non-Hodgkin lymphoma (HCC)     Past Medical History:   Diagnosis Date    Bicuspid aortic valve     CAD (coronary artery disease)      Past Surgical History:   Procedure Laterality Date    AORTIC VALVE REPLACEMENT      CORONARY ARTERY BYPASS GRAFT       Family History   Problem Relation Age of Onset    Heart Attack Neg Hx      Social History     Tobacco Use    Smoking status: Some Days     Packs/day: 0.10     Types: Cigarettes    Smokeless tobacco: Never   Substance Use Topics    Alcohol use: Yes     Alcohol/week: 14.0 standard drinks     Types: 14 Cans of beer per week     Current Outpatient Medications   Medication Sig Dispense Refill    losartan (COZAAR) 50 MG tablet Take 1 tablet by mouth in the morning. 90 tablet 3    atorvastatin (LIPITOR) 40 MG tablet Take 1 tablet by mouth daily 90 tablet 3     No current facility-administered medications for this visit. Review of Systems   Constitutional: Negative for chills, decreased appetite, fever, malaise/fatigue and weight gain. HENT:  Negative for nosebleeds. Eyes:  Negative for blurred vision and double vision. Cardiovascular:  Negative for chest pain, claudication, dyspnea on exertion, leg swelling, orthopnea, palpitations, paroxysmal nocturnal dyspnea and syncope. Respiratory:  Negative for cough, hemoptysis and shortness of breath. Endocrine: Negative for cold intolerance and heat intolerance. Hematologic/Lymphatic: Negative for bleeding problem. Skin:  Negative for rash. Musculoskeletal:  Negative for back pain, joint pain, muscle weakness and myalgias. Gastrointestinal:  Negative for bloating, abdominal pain, nausea and vomiting. Genitourinary:  Negative for dysuria. Neurological:  Negative for dizziness, light-headedness and weakness. Psychiatric/Behavioral:  Negative for altered mental status.       PHYSICAL EXAM:    /88   Pulse 76   Ht 5' 11\" (1.803 m)   Wt 178 lb (80.7 kg)   BMI 24.83 kg/m²      Physical Exam  Constitutional:       Appearance: Normal appearance. HENT:      Head: Normocephalic and atraumatic. Mouth/Throat:      Mouth: Mucous membranes are moist.   Eyes:      Pupils: Pupils are equal, round, and reactive to light. Cardiovascular:      Rate and Rhythm: Normal rate and regular rhythm. Pulses: Normal pulses. Heart sounds: Murmur (low grade COOPER at RUSB) heard. Pulmonary:      Effort: Pulmonary effort is normal.      Breath sounds: Normal breath sounds. Abdominal:      General: Bowel sounds are normal. There is no distension. Palpations: Abdomen is soft. Tenderness: There is no abdominal tenderness. Musculoskeletal:         General: No swelling. Cervical back: Normal range of motion. Skin:     General: Skin is warm and dry. Neurological:      General: No focal deficit present. Mental Status: He is alert and oriented to person, place, and time. Medical problems and test results were reviewed with the patient today. No results found for any visits on 08/04/22. ASSESSMENT and PLAN    Maggie Contreras was seen today for hypertension and results. Diagnoses and all orders for this visit:    S/P AVR    Essential hypertension    Coronary artery disease involving coronary bypass graft of native heart without angina pectoris    Other orders  -     losartan (COZAAR) 50 MG tablet; Take 1 tablet by mouth in the morning. Overall Impression    AVR-appears previously declined mechanical valve. Likely adequate prosthesis function on recent echocardiogram.  Plan repeat in about 2 years from current study. Over 10 years from AVR. Discussed continuing aspirin 81 mg daily. Also prophylaxis prior and discussed. CABG-appears one-vessel CABG emergently done with SVG to RCA; appears patient was taken back to the OR post AVR. No records. Continue aspirin. Initiated statin therapy.      Dyspnea-improved with blood pressure control    Hypertension- controlled. See above. Appears likely intolerance previously to ACE inhibitor as per history. Tolerating losartan. Closely monitor home logs and notify if elevated    Hyperlipidemia-see above with initiating statin therapy. Pre-statins, LDL noted at 93 from 7/6/2022. Mellissa Camargoery Return in about 1 year (around 8/4/2023).      Maria Fernanda Tran MD  8/4/2022  10:44 AM 81.6

## 2022-10-12 ENCOUNTER — INPATIENT (INPATIENT)
Facility: HOSPITAL | Age: 87
LOS: 9 days | Discharge: NURSING, REHAB OR LONG-TERM CARE | End: 2022-10-22
Attending: HOSPITALIST | Admitting: HOSPITALIST

## 2022-10-12 VITALS
HEIGHT: 78 IN | TEMPERATURE: 98 F | RESPIRATION RATE: 19 BRPM | WEIGHT: 169.98 LBS | SYSTOLIC BLOOD PRESSURE: 127 MMHG | DIASTOLIC BLOOD PRESSURE: 82 MMHG | HEART RATE: 96 BPM | OXYGEN SATURATION: 100 %

## 2022-10-12 DIAGNOSIS — Z98.89 OTHER SPECIFIED POSTPROCEDURAL STATES: Chronic | ICD-10-CM

## 2022-10-12 LAB
ALBUMIN SERPL ELPH-MCNC: 2 G/DL — LOW (ref 3.3–5)
ALP SERPL-CCNC: 663 U/L — HIGH (ref 40–120)
ALT FLD-CCNC: 33 U/L — SIGNIFICANT CHANGE UP (ref 12–78)
ANION GAP SERPL CALC-SCNC: 6 MMOL/L — SIGNIFICANT CHANGE UP (ref 5–17)
APTT BLD: 32.4 SEC — SIGNIFICANT CHANGE UP (ref 27.5–35.5)
AST SERPL-CCNC: 30 U/L — SIGNIFICANT CHANGE UP (ref 15–37)
BASOPHILS # BLD AUTO: 0.03 K/UL — SIGNIFICANT CHANGE UP (ref 0–0.2)
BASOPHILS NFR BLD AUTO: 0.6 % — SIGNIFICANT CHANGE UP (ref 0–2)
BILIRUB SERPL-MCNC: 1.8 MG/DL — HIGH (ref 0.2–1.2)
BUN SERPL-MCNC: 19 MG/DL — SIGNIFICANT CHANGE UP (ref 7–23)
CALCIUM SERPL-MCNC: 8.6 MG/DL — SIGNIFICANT CHANGE UP (ref 8.5–10.1)
CHLORIDE SERPL-SCNC: 121 MMOL/L — HIGH (ref 96–108)
CO2 SERPL-SCNC: 26 MMOL/L — SIGNIFICANT CHANGE UP (ref 22–31)
CREAT SERPL-MCNC: 0.66 MG/DL — SIGNIFICANT CHANGE UP (ref 0.5–1.3)
EGFR: 83 ML/MIN/1.73M2 — SIGNIFICANT CHANGE UP
EOSINOPHIL # BLD AUTO: 0.12 K/UL — SIGNIFICANT CHANGE UP (ref 0–0.5)
EOSINOPHIL NFR BLD AUTO: 2.4 % — SIGNIFICANT CHANGE UP (ref 0–6)
GLUCOSE SERPL-MCNC: 106 MG/DL — HIGH (ref 70–99)
HCT VFR BLD CALC: 37.1 % — LOW (ref 39–50)
HGB BLD-MCNC: 12 G/DL — LOW (ref 13–17)
IMM GRANULOCYTES NFR BLD AUTO: 1.2 % — HIGH (ref 0–0.9)
INR BLD: 1.43 RATIO — HIGH (ref 0.88–1.16)
LACTATE SERPL-SCNC: 0.9 MMOL/L — SIGNIFICANT CHANGE UP (ref 0.7–2)
LIDOCAIN IGE QN: 29 U/L — LOW (ref 73–393)
LYMPHOCYTES # BLD AUTO: 2.59 K/UL — SIGNIFICANT CHANGE UP (ref 1–3.3)
LYMPHOCYTES # BLD AUTO: 52 % — HIGH (ref 13–44)
MCHC RBC-ENTMCNC: 26.1 PG — LOW (ref 27–34)
MCHC RBC-ENTMCNC: 32.3 G/DL — SIGNIFICANT CHANGE UP (ref 32–36)
MCV RBC AUTO: 80.7 FL — SIGNIFICANT CHANGE UP (ref 80–100)
MONOCYTES # BLD AUTO: 0.52 K/UL — SIGNIFICANT CHANGE UP (ref 0–0.9)
MONOCYTES NFR BLD AUTO: 10.4 % — SIGNIFICANT CHANGE UP (ref 2–14)
NEUTROPHILS # BLD AUTO: 1.66 K/UL — LOW (ref 1.8–7.4)
NEUTROPHILS NFR BLD AUTO: 33.4 % — LOW (ref 43–77)
NRBC # BLD: 0 /100 WBCS — SIGNIFICANT CHANGE UP (ref 0–0)
PLATELET # BLD AUTO: 253 K/UL — SIGNIFICANT CHANGE UP (ref 150–400)
POTASSIUM SERPL-MCNC: 3.5 MMOL/L — SIGNIFICANT CHANGE UP (ref 3.5–5.3)
POTASSIUM SERPL-SCNC: 3.5 MMOL/L — SIGNIFICANT CHANGE UP (ref 3.5–5.3)
PROT SERPL-MCNC: 6.6 GM/DL — SIGNIFICANT CHANGE UP (ref 6–8.3)
PROTHROM AB SERPL-ACNC: 17.1 SEC — HIGH (ref 10.5–13.4)
RBC # BLD: 4.6 M/UL — SIGNIFICANT CHANGE UP (ref 4.2–5.8)
RBC # FLD: 18.8 % — HIGH (ref 10.3–14.5)
SODIUM SERPL-SCNC: 153 MMOL/L — HIGH (ref 135–145)
TROPONIN I, HIGH SENSITIVITY RESULT: 21.7 NG/L — SIGNIFICANT CHANGE UP
WBC # BLD: 4.98 K/UL — SIGNIFICANT CHANGE UP (ref 3.8–10.5)
WBC # FLD AUTO: 4.98 K/UL — SIGNIFICANT CHANGE UP (ref 3.8–10.5)

## 2022-10-12 PROCEDURE — 71045 X-RAY EXAM CHEST 1 VIEW: CPT | Mod: 26

## 2022-10-12 PROCEDURE — 99285 EMERGENCY DEPT VISIT HI MDM: CPT | Mod: FS

## 2022-10-12 PROCEDURE — 93010 ELECTROCARDIOGRAM REPORT: CPT

## 2022-10-12 RX ORDER — SODIUM CHLORIDE 9 MG/ML
1000 INJECTION INTRAMUSCULAR; INTRAVENOUS; SUBCUTANEOUS ONCE
Refills: 0 | Status: COMPLETED | OUTPATIENT
Start: 2022-10-12 | End: 2022-10-12

## 2022-10-12 RX ORDER — ONDANSETRON 8 MG/1
4 TABLET, FILM COATED ORAL ONCE
Refills: 0 | Status: COMPLETED | OUTPATIENT
Start: 2022-10-12 | End: 2022-10-12

## 2022-10-12 RX ADMIN — ONDANSETRON 4 MILLIGRAM(S): 8 TABLET, FILM COATED ORAL at 22:08

## 2022-10-12 RX ADMIN — SODIUM CHLORIDE 1000 MILLILITER(S): 9 INJECTION INTRAMUSCULAR; INTRAVENOUS; SUBCUTANEOUS at 22:09

## 2022-10-12 NOTE — ED PROVIDER NOTE - NS ED ATTENDING STATEMENT MOD
I have seen and examined this patient and fully participated in the care of this patient as the teaching attending.  The service was shared with the MAC.  I reviewed and verified the documentation and independently performed the documented:

## 2022-10-12 NOTE — ED PROVIDER NOTE - PHYSICAL EXAMINATION
GEN: Awake, alert, interactive, NAD.  HEAD AND NECK: NC/AT. Airway patent. Neck supple.   EYES:  Clear b/l.   ENT: Moist mucus membranes.   CARDIAC: Irregular. No evident pedal edema.    RESP/CHEST: Normal respiratory effort with no use of accessory muscles or retractions. Clear throughout on auscultation.  ABD: soft, mild distended, nontender.  No rebound, no guarding.   BACK: No midline spinal TTP. No CVAT.   EXTREMITIES: Moving all extremities with no apparent deformities. (+) RLE swelling >LLE. (+) pulses intact.   SKIN: Warm, dry, intact normal color. No rash.   NEURO: AOx1, CN II-XII grossly intact, no focal deficits.   PSYCH: Appropriate mood and affect.

## 2022-10-12 NOTE — ED ADULT NURSE NOTE - OBJECTIVE STATEMENT
Pt axox2 presents to the ED c/o abdominal since Monday. Pt brought in from nursing home. States he's been very constipated with nausea. Hx HTN, afib, heart failure. Denies diarrhea, fever, headache, dizziness

## 2022-10-12 NOTE — ED PROVIDER NOTE - ATTENDING CONTRIBUTION TO CARE
Patient from SNF with complaint of abdominal pain.  Patient poor historian.  Has basin that he is spitting into but not reactive during abdominal palpation.  Will assess with labs, CXR, CTAP.

## 2022-10-12 NOTE — ED PROVIDER NOTE - CARE PLAN
Principal Discharge DX:	Abdominal pain   1 Principal Discharge DX:	Choledocholithiasis with obstruction

## 2022-10-12 NOTE — ED PROVIDER NOTE - CLINICAL SUMMARY MEDICAL DECISION MAKING FREE TEXT BOX
89 y/o male with afib on eliquis, dementia, htn fron NH  presents with abdominal pain as per NH papers. Vs stable.   Will check labs, ekg, cxr and get ct a/p 89 y/o male with afib on eliquis, dementia, htn fron NH  presents with abdominal pain as per NH papers. Vs stable.   Will check labs, ekg, cxr and get ct a/

## 2022-10-12 NOTE — ED ADULT TRIAGE NOTE - CHIEF COMPLAINT QUOTE
as per ems, pt from nursing home, upper abdominal pain since monday. hx: constipation, dementia, afib, htn

## 2022-10-12 NOTE — ED PROVIDER NOTE - OBJECTIVE STATEMENT
89 y/o female with afib on eliquis, heart failure, PVD, dementia, htn sent in from NH c/o abdominal pain. Pt is alert and oriented x 1 , poor historian. As per NH pt c/o abdominal pain today.

## 2022-10-12 NOTE — ED PROVIDER NOTE - PROGRESS NOTE DETAILS
Patient with abdominal pain, found to have choledocholithiasis.  Patienet started on zosyn, gen surg consult, patient to be nonsurgical admission, needs GI, likely MRCP/ERCP.  Unable to transfer for ERCP due to diversion in the system.  Patient is to be admitted to the hospital and the case was discussed with the admitting physician.  Any changes in plan, additional imaging/labs, and further work up will be at the discretion of the admitting physician. Per discussion with admitting physician, all necessary consults for admitted patients to be obtained by morning team unless patient requires emergent intervention or medical advice by specialist overnight.

## 2022-10-13 DIAGNOSIS — K80.50 CALCULUS OF BILE DUCT WITHOUT CHOLANGITIS OR CHOLECYSTITIS WITHOUT OBSTRUCTION: ICD-10-CM

## 2022-10-13 DIAGNOSIS — E87.0 HYPEROSMOLALITY AND HYPERNATREMIA: ICD-10-CM

## 2022-10-13 DIAGNOSIS — I71.40 ABDOMINAL AORTIC ANEURYSM, WITHOUT RUPTURE, UNSPECIFIED: ICD-10-CM

## 2022-10-13 DIAGNOSIS — K59.00 CONSTIPATION, UNSPECIFIED: ICD-10-CM

## 2022-10-13 LAB
APPEARANCE UR: CLEAR — SIGNIFICANT CHANGE UP
BACTERIA # UR AUTO: ABNORMAL
BILIRUB UR-MCNC: ABNORMAL
COLOR SPEC: YELLOW — SIGNIFICANT CHANGE UP
DIFF PNL FLD: ABNORMAL
EPI CELLS # UR: SIGNIFICANT CHANGE UP
FLUAV AG NPH QL: SIGNIFICANT CHANGE UP
FLUBV AG NPH QL: SIGNIFICANT CHANGE UP
GLUCOSE UR QL: NEGATIVE MG/DL — SIGNIFICANT CHANGE UP
KETONES UR-MCNC: ABNORMAL
LEUKOCYTE ESTERASE UR-ACNC: ABNORMAL
NITRITE UR-MCNC: NEGATIVE — SIGNIFICANT CHANGE UP
PH UR: 5 — SIGNIFICANT CHANGE UP (ref 5–8)
PROT UR-MCNC: 30 MG/DL
RBC CASTS # UR COMP ASSIST: ABNORMAL /HPF (ref 0–4)
SARS-COV-2 RNA SPEC QL NAA+PROBE: SIGNIFICANT CHANGE UP
SP GR SPEC: 1.01 — SIGNIFICANT CHANGE UP (ref 1.01–1.02)
UROBILINOGEN FLD QL: 8 MG/DL
WBC UR QL: SIGNIFICANT CHANGE UP

## 2022-10-13 PROCEDURE — 74177 CT ABD & PELVIS W/CONTRAST: CPT | Mod: 26,MA

## 2022-10-13 PROCEDURE — 99223 1ST HOSP IP/OBS HIGH 75: CPT

## 2022-10-13 RX ORDER — CARVEDILOL PHOSPHATE 80 MG/1
3.12 CAPSULE, EXTENDED RELEASE ORAL EVERY 12 HOURS
Refills: 0 | Status: DISCONTINUED | OUTPATIENT
Start: 2022-10-13 | End: 2022-10-22

## 2022-10-13 RX ORDER — TAMSULOSIN HYDROCHLORIDE 0.4 MG/1
0.4 CAPSULE ORAL AT BEDTIME
Refills: 0 | Status: DISCONTINUED | OUTPATIENT
Start: 2022-10-13 | End: 2022-10-22

## 2022-10-13 RX ORDER — SODIUM CHLORIDE 9 MG/ML
1000 INJECTION, SOLUTION INTRAVENOUS
Refills: 0 | Status: DISCONTINUED | OUTPATIENT
Start: 2022-10-13 | End: 2022-10-14

## 2022-10-13 RX ORDER — PIPERACILLIN AND TAZOBACTAM 4; .5 G/20ML; G/20ML
3.38 INJECTION, POWDER, LYOPHILIZED, FOR SOLUTION INTRAVENOUS ONCE
Refills: 0 | Status: COMPLETED | OUTPATIENT
Start: 2022-10-13 | End: 2022-10-13

## 2022-10-13 RX ORDER — MEMANTINE HYDROCHLORIDE 10 MG/1
5 TABLET ORAL
Refills: 0 | Status: DISCONTINUED | OUTPATIENT
Start: 2022-10-13 | End: 2022-10-22

## 2022-10-13 RX ORDER — ESCITALOPRAM OXALATE 10 MG/1
10 TABLET, FILM COATED ORAL DAILY
Refills: 0 | Status: DISCONTINUED | OUTPATIENT
Start: 2022-10-13 | End: 2022-10-22

## 2022-10-13 RX ORDER — MORPHINE SULFATE 50 MG/1
1 CAPSULE, EXTENDED RELEASE ORAL EVERY 4 HOURS
Refills: 0 | Status: DISCONTINUED | OUTPATIENT
Start: 2022-10-13 | End: 2022-10-16

## 2022-10-13 RX ORDER — ALBUTEROL 90 UG/1
1 AEROSOL, METERED ORAL EVERY 6 HOURS
Refills: 0 | Status: DISCONTINUED | OUTPATIENT
Start: 2022-10-13 | End: 2022-10-22

## 2022-10-13 RX ORDER — LANOLIN ALCOHOL/MO/W.PET/CERES
3 CREAM (GRAM) TOPICAL AT BEDTIME
Refills: 0 | Status: DISCONTINUED | OUTPATIENT
Start: 2022-10-13 | End: 2022-10-22

## 2022-10-13 RX ORDER — APIXABAN 2.5 MG/1
5 TABLET, FILM COATED ORAL EVERY 12 HOURS
Refills: 0 | Status: DISCONTINUED | OUTPATIENT
Start: 2022-10-13 | End: 2022-10-22

## 2022-10-13 RX ORDER — POTASSIUM CHLORIDE 20 MEQ
40 PACKET (EA) ORAL ONCE
Refills: 0 | Status: COMPLETED | OUTPATIENT
Start: 2022-10-13 | End: 2022-10-13

## 2022-10-13 RX ORDER — POLYETHYLENE GLYCOL 3350 17 G/17G
17 POWDER, FOR SOLUTION ORAL DAILY
Refills: 0 | Status: DISCONTINUED | OUTPATIENT
Start: 2022-10-13 | End: 2022-10-22

## 2022-10-13 RX ORDER — ENOXAPARIN SODIUM 100 MG/ML
40 INJECTION SUBCUTANEOUS EVERY 24 HOURS
Refills: 0 | Status: DISCONTINUED | OUTPATIENT
Start: 2022-10-13 | End: 2022-10-13

## 2022-10-13 RX ORDER — SENNA PLUS 8.6 MG/1
2 TABLET ORAL AT BEDTIME
Refills: 0 | Status: DISCONTINUED | OUTPATIENT
Start: 2022-10-13 | End: 2022-10-22

## 2022-10-13 RX ADMIN — ESCITALOPRAM OXALATE 10 MILLIGRAM(S): 10 TABLET, FILM COATED ORAL at 11:01

## 2022-10-13 RX ADMIN — CARVEDILOL PHOSPHATE 3.12 MILLIGRAM(S): 80 CAPSULE, EXTENDED RELEASE ORAL at 06:38

## 2022-10-13 RX ADMIN — ENOXAPARIN SODIUM 40 MILLIGRAM(S): 100 INJECTION SUBCUTANEOUS at 05:28

## 2022-10-13 RX ADMIN — SENNA PLUS 2 TABLET(S): 8.6 TABLET ORAL at 22:14

## 2022-10-13 RX ADMIN — PIPERACILLIN AND TAZOBACTAM 200 GRAM(S): 4; .5 INJECTION, POWDER, LYOPHILIZED, FOR SOLUTION INTRAVENOUS at 02:33

## 2022-10-13 RX ADMIN — SODIUM CHLORIDE 100 MILLILITER(S): 9 INJECTION, SOLUTION INTRAVENOUS at 19:34

## 2022-10-13 RX ADMIN — CARVEDILOL PHOSPHATE 3.12 MILLIGRAM(S): 80 CAPSULE, EXTENDED RELEASE ORAL at 17:11

## 2022-10-13 RX ADMIN — SODIUM CHLORIDE 100 MILLILITER(S): 9 INJECTION, SOLUTION INTRAVENOUS at 11:16

## 2022-10-13 RX ADMIN — SODIUM CHLORIDE 100 MILLILITER(S): 9 INJECTION, SOLUTION INTRAVENOUS at 17:12

## 2022-10-13 RX ADMIN — TAMSULOSIN HYDROCHLORIDE 0.4 MILLIGRAM(S): 0.4 CAPSULE ORAL at 22:14

## 2022-10-13 RX ADMIN — SODIUM CHLORIDE 1000 MILLILITER(S): 9 INJECTION INTRAMUSCULAR; INTRAVENOUS; SUBCUTANEOUS at 02:10

## 2022-10-13 RX ADMIN — APIXABAN 5 MILLIGRAM(S): 2.5 TABLET, FILM COATED ORAL at 17:11

## 2022-10-13 RX ADMIN — Medication 3 MILLIGRAM(S): at 23:06

## 2022-10-13 RX ADMIN — MEMANTINE HYDROCHLORIDE 5 MILLIGRAM(S): 10 TABLET ORAL at 06:39

## 2022-10-13 RX ADMIN — MEMANTINE HYDROCHLORIDE 5 MILLIGRAM(S): 10 TABLET ORAL at 17:11

## 2022-10-13 RX ADMIN — APIXABAN 5 MILLIGRAM(S): 2.5 TABLET, FILM COATED ORAL at 06:49

## 2022-10-13 RX ADMIN — POLYETHYLENE GLYCOL 3350 17 GRAM(S): 17 POWDER, FOR SOLUTION ORAL at 05:36

## 2022-10-13 RX ADMIN — Medication 40 MILLIEQUIVALENT(S): at 06:40

## 2022-10-13 RX ADMIN — SODIUM CHLORIDE 100 MILLILITER(S): 9 INJECTION, SOLUTION INTRAVENOUS at 05:36

## 2022-10-13 NOTE — H&P ADULT - HIV OFFER
[facility] Internal Medicine Progress Note  Patient: Selvin Ortiz 61 y o  male   MRN: [id number]  PCP: Faviola Heredia MD  Unit/Bed#: [facility] Encounter: [id number]  [facility] )      Plan:    1  CHERYL: Opt out hold due to CHERYL               

## 2022-10-13 NOTE — CHART NOTE - NSCHARTNOTEFT_GEN_A_CORE
H&P dated today reviewed in full. H&P dated today reviewed in full.  NPO, for MRCP to eval dilated CBD ducts. BPH noted on CT, add Flomax. H&P dated today reviewed in full.  Advance to clear, MRCP to eval dilated CBD ducts. BPH noted on CT, add Flomax. Need to r/o pancreatic mass.

## 2022-10-13 NOTE — H&P ADULT - NSHPPHYSICALEXAM_GEN_ALL_CORE
T(C): 36.6 (10-13-22 @ 05:50), Max: 36.9 (10-13-22 @ 05:03)  HR: 88 (10-13-22 @ 05:50) (84 - 96)  BP: 143/92 (10-13-22 @ 05:50) (120/75 - 143/92)  RR: 15 (10-13-22 @ 05:50) (14 - 19)  SpO2: 95% (10-13-22 @ 05:50) (95% - 100%)    CONSTITUTIONAL: Well groomed, no apparent distress  EYES: PERRLA and symmetric, EOMI, No conjunctival or scleral injection, non-icteric  ENMT: Oral mucosa with moist membranes. Poor dentition; no pharyngeal injection or exudates  NECK: Cystic growth on R neck. Supple, symmetric and without tracheal deviation   RESP: No respiratory distress, no use of accessory muscles; CTA b/l, no WRR  CV: RRR, +S1S2, no MRG; no JVD; no peripheral edema  GI: RUQ and epigastric tenderness. Soft, ND, no rebound, no guarding  LYMPH: No cervical LAD or tenderness; no axillary LAD or tenderness; no inguinal LAD or tenderness  MSK: No spinal tenderness, normal muscle strength/tone  SKIN: No rashes or ulcers noted; no subcutaneous nodules or induration palpable  NEURO: CN II-XII intact; sensation intact in upper and lower extremities b/l to light touch   PSYCH: Appropriate insight/judgment; mood and affect appropriate

## 2022-10-13 NOTE — H&P ADULT - NSICDXPASTMEDICALHX_GEN_ALL_CORE_FT
PAST MEDICAL HISTORY:  A-fib On Eliquis    Cholelithiasis     Dementia     Depression     DVT (deep venous thrombosis) bilateral    HLD (hyperlipidemia)     Hypertension     OA (osteoarthritis)

## 2022-10-13 NOTE — CHART NOTE - NSCHARTNOTEFT_GEN_A_CORE
RN received call from pt's long term care facility.  they report pt had US 9/22/22 revealing "large amount of echogenic density in gallbladder."  they were asked to fax report to 2D unit.    Pt awaiting MRCP to eval dilated CBD ducts

## 2022-10-13 NOTE — CONSULT NOTE ADULT - NS ATTEND AMEND GEN_ALL_CORE FT
Patient seen and examined with PAs  Labs and imaging reviewed  CT suspicious for choledocholithiasis, Bilirubin 1.8    - no surgical intervention  - recommend goals of care discussion  - if within patient's goals of care, can consider GI consultation

## 2022-10-13 NOTE — H&P ADULT - HISTORY OF PRESENT ILLNESS
89yo Male w PMHx of afib on eliquis, PVD, Dementia, HTN presents with abdominal pain in RUQ and epigastric region. It has been increasing over the past 4 days as per NH report. Reviewing old records it appears the patient has had a cholecystostomy tube in 2019 but it was removed by the patient. Abdominal pain is patient's only complaint on exam. patient admits to not having a bowel movement in several days.    Pt is a poor historian.
(2) potential problem

## 2022-10-13 NOTE — PATIENT PROFILE ADULT - FALL HARM RISK - HARM RISK INTERVENTIONS

## 2022-10-13 NOTE — H&P ADULT - REASON FOR ADMISSION
-- DO NOT REPLY / DO NOT REPLY ALL --  -- Message is from the [facility]--    General Patient Message      Reason for Call: the patient had a missed call from the office and is concerned about what it could have been in regards to, he does have an upcoming appointment on 12/31 @ 1 p.m.   Please contact the patient     Caller Information       Type Contact Phone    12/18/2019 08:21 PM Phone (Incoming) Derian Finn (Self) [phone] (H)          Alternative phone number: none     Turnaround time given to caller:   \"This message will be sent to [state Provider's name]. The clinical team will fulfill your request as soon as they review your message when the office opens tomorrow.\"}     Choledocolithiasis

## 2022-10-13 NOTE — CONSULT NOTE ADULT - SUBJECTIVE AND OBJECTIVE BOX
Patient is a 89y old  Male who presents with a chief complaint of abdominal unclear if pain started today or has been increasing over the past 4 days as per NH report. Hx of afib on anticoagulation, PVD, Dementia, HTN. Patient is a pleasant but poor historian. Reviewing old records it appears the patient has had a cholecystostomy tube in 2019 but it was removed by the patient. Patient is able to describe nausea and vague abdominal pain.         PAST MEDICAL & SURGICAL HISTORY:  DVT (deep venous thrombosis) bilateral, on coumadin    Hypertension    Depression    Dementia    HLD (hyperlipidemia)    OA (osteoarthritis)    Cholelithiasis    S/P IVC filter      Review of Systems:  I have reviewed 9 systems with the patient and the only positive findings were nausea and vague abdominal pain    MEDICATIONS  (STANDING):  piperacillin/tazobactam IVPB... 3.375 Gram(s) IV Intermittent once    MEDICATIONS  (PRN):      Allergies    No Known Allergies    SOCIAL HISTORY: lives in a nursing home, no toxic habits          FAMILY HISTORY: non contributory      Vital Signs Last 24 Hrs  T(C): 36.7 (12 Oct 2022 17:28), Max: 36.7 (12 Oct 2022 17:28)  T(F): 98 (12 Oct 2022 17:28), Max: 98 (12 Oct 2022 17:28)  HR: 96 (12 Oct 2022 17:28) (96 - 96)  BP: 127/82 (12 Oct 2022 17:28) (127/82 - 127/82)  BP(mean): --  RR: 19 (12 Oct 2022 17:28) (19 - 19)  SpO2: 100% (12 Oct 2022 17:28) (100% - 100%)    Parameters below as of 12 Oct 2022 17:28  Patient On (Oxygen Delivery Method): room air        Physical Exam:  General:  Appears stated age, well-groomed, well-nourished, no distress  Eyes: EOMI, conjunctiva clear  HENT:  WNL, no JVD  Chest:  clear breath sounds  Cardiovascular:  Regular rate & rhythm  Abdomen:  mild RUQ pain, not distended, +bsx4  Extremities:  + pedal edema or no calf tenderness noted  Skin:  No rash, has a large bluish growth in the supraclavicular region on the right side.   Musculoskeletal:  normal strength  Neuro/Psych:  Alert, oriented to time, place and person     LABS:                        12.0   4.98  )-----------( 253      ( 12 Oct 2022 22:20 )             37.1     10-12    153<H>  |  121<H>  |  19  ----------------------------<  106<H>  3.5   |  26  |  0.66    Ca    8.6      12 Oct 2022 22:20    TPro  6.6  /  Alb  2.0<L>  /  TBili  1.8<H>  /  DBili  x   /  AST  30  /  ALT  33  /  AlkPhos  663<H>  10-12    PT/INR - ( 12 Oct 2022 22:20 )   PT: 17.1 sec;   INR: 1.43 ratio         PTT - ( 12 Oct 2022 22:20 )  PTT:32.4 sec      RADIOLOGY & ADDITIONAL STUDIES: < from: CT Abdomen and Pelvis w/ IV Cont (10.13.22 @ 00:48) >  Interval development of biliary duct dilatation, with increased density   seen along the path of the distal common bile duct. Findings are   suspicious for choledocholithiasis associated acuteobstruction.    Pancreatic ductal dilatation, increased compared to previous exam. This   may be related to acute biliary findings, though nonspecific. This would   be better evaluated with endoscopy or MRI/MRCP.    Marked prostate enlargement. Thick-walled bladder is likely related to   sequelae of chronic outlet obstruction from prostate enlargement.   Superimposed cystitis should be evaluated for clinically, correlation   with urinalysis.    Fecal impaction and question stercoral proctitis. Constipation.    Ascending aortic dilatation of 4.1 cm.    Pulmonary arterial enlargement, nonspecific though can be seen with   pulmonary arterial hypertension. Correlate clinically.    Additional findings and recommendations as above.    Acute findings were discussed with Dr. Phillip by telephone at 1:58 AM.    < end of copied text >      Impression/Plan: 89 year old male with hx of cholelithiasis, demenita, HTN, depression, DVT and HLD.     recommend consideration of medical admission for hypernatremia and hypokalemia  no plan for surgical intervention at this time.   recommend IR consult for possible replacement of cholecystostomy tube  recommend consideration of GI consult.   recommend GOC conversation   can consider MRCP if patient can tolerate   will discuss further with Dr. Givens- of note patient is a high risk for complications and poor surgical candidate.     Patient is a 89y old  Male who presents with a chief complaint of abdominal unclear if pain started today or has been increasing over the past 4 days as per NH report. Hx of afib on anticoagulation, PVD, Dementia, HTN. Patient is a pleasant but poor historian. Reviewing old records it appears the patient has had a cholecystostomy tube in 2019 but it was removed by the patient. Patient is able to describe nausea and vague abdominal pain.         PAST MEDICAL & SURGICAL HISTORY:  DVT (deep venous thrombosis) bilateral, on coumadin    Hypertension    Depression    Dementia    HLD (hyperlipidemia)    OA (osteoarthritis)    Cholelithiasis    S/P IVC filter      Review of Systems:  I have reviewed 9 systems with the patient and the only positive findings were nausea and vague abdominal pain    MEDICATIONS  (STANDING):  piperacillin/tazobactam IVPB... 3.375 Gram(s) IV Intermittent once    MEDICATIONS  (PRN):      Allergies    No Known Allergies    SOCIAL HISTORY: lives in a nursing home, no toxic habits          FAMILY HISTORY: non contributory      Vital Signs Last 24 Hrs  T(C): 36.7 (12 Oct 2022 17:28), Max: 36.7 (12 Oct 2022 17:28)  T(F): 98 (12 Oct 2022 17:28), Max: 98 (12 Oct 2022 17:28)  HR: 96 (12 Oct 2022 17:28) (96 - 96)  BP: 127/82 (12 Oct 2022 17:28) (127/82 - 127/82)  BP(mean): --  RR: 19 (12 Oct 2022 17:28) (19 - 19)  SpO2: 100% (12 Oct 2022 17:28) (100% - 100%)    Parameters below as of 12 Oct 2022 17:28  Patient On (Oxygen Delivery Method): room air        Physical Exam:  General:  Appears stated age, well-groomed, well-nourished, no distress  Eyes: EOMI, conjunctiva clear  HENT:  WNL, no JVD  Chest:  clear breath sounds  Cardiovascular:  Regular rate & rhythm  Abdomen:  mild RUQ pain, not distended, +bsx4  Extremities:  + pedal edema or no calf tenderness noted  Skin:  No rash, has a large bluish growth in the supraclavicular region on the right side.   Musculoskeletal:  normal strength  Neuro/Psych:  Alert, oriented to time, place and person     LABS:                        12.0   4.98  )-----------( 253      ( 12 Oct 2022 22:20 )             37.1     10-12    153<H>  |  121<H>  |  19  ----------------------------<  106<H>  3.5   |  26  |  0.66    Ca    8.6      12 Oct 2022 22:20    TPro  6.6  /  Alb  2.0<L>  /  TBili  1.8<H>  /  DBili  x   /  AST  30  /  ALT  33  /  AlkPhos  663<H>  10-12    PT/INR - ( 12 Oct 2022 22:20 )   PT: 17.1 sec;   INR: 1.43 ratio         PTT - ( 12 Oct 2022 22:20 )  PTT:32.4 sec      RADIOLOGY & ADDITIONAL STUDIES: < from: CT Abdomen and Pelvis w/ IV Cont (10.13.22 @ 00:48) >  Interval development of biliary duct dilatation, with increased density   seen along the path of the distal common bile duct. Findings are   suspicious for choledocholithiasis associated acuteobstruction.    Pancreatic ductal dilatation, increased compared to previous exam. This   may be related to acute biliary findings, though nonspecific. This would   be better evaluated with endoscopy or MRI/MRCP.    Marked prostate enlargement. Thick-walled bladder is likely related to   sequelae of chronic outlet obstruction from prostate enlargement.   Superimposed cystitis should be evaluated for clinically, correlation   with urinalysis.    Fecal impaction and question stercoral proctitis. Constipation.    Ascending aortic dilatation of 4.1 cm.    Pulmonary arterial enlargement, nonspecific though can be seen with   pulmonary arterial hypertension. Correlate clinically.    Additional findings and recommendations as above.    Acute findings were discussed with Dr. Phillip by telephone at 1:58 AM.    < end of copied text >      Impression/Plan: 89 year old male with hx of cholelithiasis, demenita, HTN, depression, DVT and HLD.     no plan for surgical intervention at this time  recommend GOC conversation   recommend consideration of GI consult.   can consider MRCP if patient can tolerate

## 2022-10-13 NOTE — H&P ADULT - ASSESSMENT
89yo Male w PMHx of afib on eliquis, PVD, Dementia, HTN presents with abdominal pain in RUQ and epigastric region.     #Choledocolithiasis  - CT abdomen reveals choledocolithiasis with bile duct dilatation  - F/u MRCP  - Please consult GI in AM  - Surgery consulted  - Transfer attempted by ED for ERCP but failed  - Morphine 1mg q4h PRN pain    #Hypernatremia  - Na of 153  - D5 @100  - Follow Na, do not correct more than 10 in 24h span.    #Constipation  - Constipation on CT abdomen  - Miralax and senna    #Aortic Aneurysm  - Follow up measurements outpatient  - Measured 4.1cm on CT abdomen    #A-fib  - Continue home eliquis and carvedilol  - Rate controlled on Tele  - Tele monitoring    Code: Full  VTE: Eliquis  Diet: NPO

## 2022-10-13 NOTE — H&P ADULT - NSHPLABSRESULTS_GEN_ALL_CORE
12.0   4.98  )-----------( 253      ( 12 Oct 2022 22:20 )             37.1       10-12    153<H>  |  121<H>  |  19  ----------------------------<  106<H>  3.5   |  26  |  0.66    Ca    8.6      12 Oct 2022 22:20    TPro  6.6  /  Alb  2.0<L>  /  TBili  1.8<H>  /  DBili  x   /  AST  30  /  ALT  33  /  AlkPhos  663<H>  10-12              PT/INR - ( 12 Oct 2022 22:20 )   PT: 17.1 sec;   INR: 1.43 ratio         PTT - ( 12 Oct 2022 22:20 )  PTT:32.4 sec

## 2022-10-14 LAB
A1C WITH ESTIMATED AVERAGE GLUCOSE RESULT: 6.5 % — HIGH (ref 4–5.6)
ALBUMIN SERPL ELPH-MCNC: 2.2 G/DL — LOW (ref 3.3–5)
ALP SERPL-CCNC: 688 U/L — HIGH (ref 40–120)
ALT FLD-CCNC: 35 U/L — SIGNIFICANT CHANGE UP (ref 12–78)
ANION GAP SERPL CALC-SCNC: 6 MMOL/L — SIGNIFICANT CHANGE UP (ref 5–17)
AST SERPL-CCNC: 38 U/L — HIGH (ref 15–37)
BILIRUB SERPL-MCNC: 1.9 MG/DL — HIGH (ref 0.2–1.2)
BUN SERPL-MCNC: 13 MG/DL — SIGNIFICANT CHANGE UP (ref 7–23)
CALCIUM SERPL-MCNC: 8.5 MG/DL — SIGNIFICANT CHANGE UP (ref 8.5–10.1)
CHLORIDE SERPL-SCNC: 115 MMOL/L — HIGH (ref 96–108)
CHOLEST SERPL-MCNC: 156 MG/DL — SIGNIFICANT CHANGE UP
CO2 SERPL-SCNC: 27 MMOL/L — SIGNIFICANT CHANGE UP (ref 22–31)
CREAT SERPL-MCNC: 0.75 MG/DL — SIGNIFICANT CHANGE UP (ref 0.5–1.3)
EGFR: 80 ML/MIN/1.73M2 — SIGNIFICANT CHANGE UP
ESTIMATED AVERAGE GLUCOSE: 140 MG/DL — HIGH (ref 68–114)
GLUCOSE SERPL-MCNC: 139 MG/DL — HIGH (ref 70–99)
HCT VFR BLD CALC: 38.9 % — LOW (ref 39–50)
HDLC SERPL-MCNC: 23 MG/DL — LOW
HGB BLD-MCNC: 12.6 G/DL — LOW (ref 13–17)
LIPID PNL WITH DIRECT LDL SERPL: 110 MG/DL — HIGH
MCHC RBC-ENTMCNC: 25.6 PG — LOW (ref 27–34)
MCHC RBC-ENTMCNC: 32.4 G/DL — SIGNIFICANT CHANGE UP (ref 32–36)
MCV RBC AUTO: 78.9 FL — LOW (ref 80–100)
NON HDL CHOLESTEROL: 133 MG/DL — HIGH
NRBC # BLD: 0 /100 WBCS — SIGNIFICANT CHANGE UP (ref 0–0)
PLATELET # BLD AUTO: 234 K/UL — SIGNIFICANT CHANGE UP (ref 150–400)
POTASSIUM SERPL-MCNC: 3.7 MMOL/L — SIGNIFICANT CHANGE UP (ref 3.5–5.3)
POTASSIUM SERPL-SCNC: 3.7 MMOL/L — SIGNIFICANT CHANGE UP (ref 3.5–5.3)
PROT SERPL-MCNC: 6.5 GM/DL — SIGNIFICANT CHANGE UP (ref 6–8.3)
RBC # BLD: 4.93 M/UL — SIGNIFICANT CHANGE UP (ref 4.2–5.8)
RBC # FLD: 19.2 % — HIGH (ref 10.3–14.5)
SODIUM SERPL-SCNC: 148 MMOL/L — HIGH (ref 135–145)
TRIGL SERPL-MCNC: 118 MG/DL — SIGNIFICANT CHANGE UP
WBC # BLD: 4.94 K/UL — SIGNIFICANT CHANGE UP (ref 3.8–10.5)
WBC # FLD AUTO: 4.94 K/UL — SIGNIFICANT CHANGE UP (ref 3.8–10.5)

## 2022-10-14 PROCEDURE — 99232 SBSQ HOSP IP/OBS MODERATE 35: CPT

## 2022-10-14 RX ADMIN — MEMANTINE HYDROCHLORIDE 5 MILLIGRAM(S): 10 TABLET ORAL at 06:02

## 2022-10-14 RX ADMIN — TAMSULOSIN HYDROCHLORIDE 0.4 MILLIGRAM(S): 0.4 CAPSULE ORAL at 21:44

## 2022-10-14 RX ADMIN — SODIUM CHLORIDE 75 MILLILITER(S): 9 INJECTION, SOLUTION INTRAVENOUS at 12:50

## 2022-10-14 RX ADMIN — SODIUM CHLORIDE 100 MILLILITER(S): 9 INJECTION, SOLUTION INTRAVENOUS at 06:01

## 2022-10-14 RX ADMIN — CARVEDILOL PHOSPHATE 3.12 MILLIGRAM(S): 80 CAPSULE, EXTENDED RELEASE ORAL at 17:31

## 2022-10-14 RX ADMIN — SODIUM CHLORIDE 100 MILLILITER(S): 9 INJECTION, SOLUTION INTRAVENOUS at 08:48

## 2022-10-14 RX ADMIN — ESCITALOPRAM OXALATE 10 MILLIGRAM(S): 10 TABLET, FILM COATED ORAL at 11:27

## 2022-10-14 RX ADMIN — SENNA PLUS 2 TABLET(S): 8.6 TABLET ORAL at 21:44

## 2022-10-14 RX ADMIN — APIXABAN 5 MILLIGRAM(S): 2.5 TABLET, FILM COATED ORAL at 17:31

## 2022-10-14 RX ADMIN — MEMANTINE HYDROCHLORIDE 5 MILLIGRAM(S): 10 TABLET ORAL at 17:30

## 2022-10-14 RX ADMIN — POLYETHYLENE GLYCOL 3350 17 GRAM(S): 17 POWDER, FOR SOLUTION ORAL at 11:27

## 2022-10-14 RX ADMIN — SODIUM CHLORIDE 100 MILLILITER(S): 9 INJECTION, SOLUTION INTRAVENOUS at 04:40

## 2022-10-14 RX ADMIN — CARVEDILOL PHOSPHATE 3.12 MILLIGRAM(S): 80 CAPSULE, EXTENDED RELEASE ORAL at 06:02

## 2022-10-14 RX ADMIN — Medication 3 MILLIGRAM(S): at 21:44

## 2022-10-14 RX ADMIN — APIXABAN 5 MILLIGRAM(S): 2.5 TABLET, FILM COATED ORAL at 06:01

## 2022-10-14 NOTE — PROGRESS NOTE ADULT - SUBJECTIVE AND OBJECTIVE BOX
INTERVAL HPI/OVERNIGHT EVENTS:  Pt seen and examined at bedside.     Allergies/Intolerance: No Known Allergies      MEDICATIONS  (STANDING):  apixaban 5 milliGRAM(s) Oral every 12 hours  carvedilol 3.125 milliGRAM(s) Oral every 12 hours  dextrose 5%. 1000 milliLiter(s) (100 mL/Hr) IV Continuous <Continuous>  escitalopram 10 milliGRAM(s) Oral daily  memantine 5 milliGRAM(s) Oral two times a day  polyethylene glycol 3350 17 Gram(s) Oral daily  senna 2 Tablet(s) Oral at bedtime  tamsulosin 0.4 milliGRAM(s) Oral at bedtime    MEDICATIONS  (PRN):  ALBUTerol    90 MICROgram(s) HFA Inhaler 1 Puff(s) Inhalation every 6 hours PRN Shortness of Breath and/or Wheezing  melatonin 3 milliGRAM(s) Oral at bedtime PRN Insomnia  morphine  - Injectable 1 milliGRAM(s) IV Push every 4 hours PRN For Moderate Pain (4 - 6)        ROS: all systems reviewed and wnl      PHYSICAL EXAMINATION:  Vital Signs Last 24 Hrs  T(C): 36.6 (14 Oct 2022 05:17), Max: 36.6 (13 Oct 2022 15:13)  T(F): 97.9 (14 Oct 2022 05:17), Max: 97.9 (13 Oct 2022 15:13)  HR: 95 (14 Oct 2022 05:17) (86 - 95)  BP: 144/81 (14 Oct 2022 05:17) (129/77 - 150/89)  BP(mean): --  RR: 18 (14 Oct 2022 05:17) (18 - 18)  SpO2: 99% (14 Oct 2022 05:17) (94% - 100%)    Parameters below as of 14 Oct 2022 05:17  Patient On (Oxygen Delivery Method): room air      CAPILLARY BLOOD GLUCOSE            GENERAL:   NECK: supple, No JVD  CHEST/LUNG: clear to auscultation bilaterally; no rales, rhonchi, or wheezing b/l  HEART: normal S1, S2  ABDOMEN: BS+, soft, ND, NT   EXTREMITIES:  pulses palpable; no clubbing, cyanosis, or edema b/l LEs  SKIN: no rashes or lesions      LABS:                        12.0   4.98  )-----------( 253      ( 12 Oct 2022 22:20 )             37.1     10-12    153<H>  |  121<H>  |  19  ----------------------------<  106<H>  3.5   |  26  |  0.66    Ca    8.6      12 Oct 2022 22:20    TPro  6.6  /  Alb  2.0<L>  /  TBili  1.8<H>  /  DBili  x   /  AST  30  /  ALT  33  /  AlkPhos  663<H>  10-12    PT/INR - ( 12 Oct 2022 22:20 )   PT: 17.1 sec;   INR: 1.43 ratio         PTT - ( 12 Oct 2022 22:20 )  PTT:32.4 sec  Urinalysis Basic - ( 13 Oct 2022 02:00 )    Color: Yellow / Appearance: Clear / S.010 / pH: x  Gluc: x / Ketone: Small  / Bili: Small / Urobili: 8 mg/dL   Blood: x / Protein: 30 mg/dL / Nitrite: Negative   Leuk Esterase: Trace / RBC: 3-5 /HPF / WBC 3-5   Sq Epi: x / Non Sq Epi: Few / Bacteria: Moderate             INTERVAL HPI/OVERNIGHT EVENTS:  Pt seen and examined at bedside.     Allergies/Intolerance: No Known Allergies      MEDICATIONS  (STANDING):  apixaban 5 milliGRAM(s) Oral every 12 hours  carvedilol 3.125 milliGRAM(s) Oral every 12 hours  dextrose 5%. 1000 milliLiter(s) (100 mL/Hr) IV Continuous <Continuous>  escitalopram 10 milliGRAM(s) Oral daily  memantine 5 milliGRAM(s) Oral two times a day  polyethylene glycol 3350 17 Gram(s) Oral daily  senna 2 Tablet(s) Oral at bedtime  tamsulosin 0.4 milliGRAM(s) Oral at bedtime    MEDICATIONS  (PRN):  ALBUTerol    90 MICROgram(s) HFA Inhaler 1 Puff(s) Inhalation every 6 hours PRN Shortness of Breath and/or Wheezing  melatonin 3 milliGRAM(s) Oral at bedtime PRN Insomnia  morphine  - Injectable 1 milliGRAM(s) IV Push every 4 hours PRN For Moderate Pain (4 - 6)        ROS: all systems reviewed and wnl      PHYSICAL EXAMINATION:  Vital Signs Last 24 Hrs  T(C): 36.6 (14 Oct 2022 05:17), Max: 36.6 (13 Oct 2022 15:13)  T(F): 97.9 (14 Oct 2022 05:17), Max: 97.9 (13 Oct 2022 15:13)  HR: 95 (14 Oct 2022 05:17) (86 - 95)  BP: 144/81 (14 Oct 2022 05:17) (129/77 - 150/89)  BP(mean): --  RR: 18 (14 Oct 2022 05:17) (18 - 18)  SpO2: 99% (14 Oct 2022 05:17) (94% - 100%)    Parameters below as of 14 Oct 2022 05:17  Patient On (Oxygen Delivery Method): room air      CAPILLARY BLOOD GLUCOSE            GENERAL: stable, less abdominal pain,   NECK: supple, No JVD  CHEST/LUNG: clear to auscultation bilaterally; no rales, rhonchi, or wheezing b/l  HEART: normal S1, S2  ABDOMEN: BS+, soft, ND, NT   EXTREMITIES:  pulses palpable; no clubbing, cyanosis, or edema b/l LEs  SKIN: no rashes or lesions      LABS:                        12.0   4.98  )-----------( 253      ( 12 Oct 2022 22:20 )             37.1     10-12    153<H>  |  121<H>  |  19  ----------------------------<  106<H>  3.5   |  26  |  0.66    Ca    8.6      12 Oct 2022 22:20    TPro  6.6  /  Alb  2.0<L>  /  TBili  1.8<H>  /  DBili  x   /  AST  30  /  ALT  33  /  AlkPhos  663<H>  10    PT/INR - ( 12 Oct 2022 22:20 )   PT: 17.1 sec;   INR: 1.43 ratio         PTT - ( 12 Oct 2022 22:20 )  PTT:32.4 sec  Urinalysis Basic - ( 13 Oct 2022 02:00 )    Color: Yellow / Appearance: Clear / S.010 / pH: x  Gluc: x / Ketone: Small  / Bili: Small / Urobili: 8 mg/dL   Blood: x / Protein: 30 mg/dL / Nitrite: Negative   Leuk Esterase: Trace / RBC: 3-5 /HPF / WBC 3-5   Sq Epi: x / Non Sq Epi: Few / Bacteria: Moderate

## 2022-10-14 NOTE — PROGRESS NOTE ADULT - ASSESSMENT
89yo Male w PMHx of afib on eliquis, PVD, Dementia, HTN presents with abdominal pain in RUQ and epigastric region.     #Choledocolithiasis  - CT abdomen reveals choledocolithiasis with bile duct dilatation  - F/u MRCP  - Please consult GI in AM  - Surgery consulted  - Transfer attempted by ED for ERCP but failed  - Morphine 1mg q4h PRN pain    #Hypernatremia  - Na of 153  - D5 @100  - Follow Na, do not correct more than 10 in 24h span.    #Constipation  - Constipation on CT abdomen  - Miralax and senna    #Aortic Aneurysm  - Follow up measurements outpatient  - Measured 4.1cm on CT abdomen    #A-fib  - Continue home eliquis and carvedilol  - Rate controlled on Tele  - Tele monitoring    Code: Full  VTE: Eliquis  Diet: NPO 89yo Male w PMHx of afib on eliquis, PVD, Dementia, HTN presents with abdominal pain in RUQ and epigastric region.       #Choledocolithiasis  - CT abdomen reveals choledocolithiasis with bile duct dilatation  - F/u MRCP  - Please consult GI in AM  - Surgery consulted.     Advance to full liquid diet.        #Hypernatremia  - Na of 153, now better 148. D5W down to 75.   - Follow Na, do not correct more than 10 in 24h span.    #Constipation  - Constipation on CT abdomen  - Miralax and senna    #A-fib  - Continue home eliquis and carvedilol  - Rate controlled on Tele  - Tele monitoring    Code: Full  VTE: Eliquis  Diet: NPO

## 2022-10-15 LAB
ANION GAP SERPL CALC-SCNC: 7 MMOL/L — SIGNIFICANT CHANGE UP (ref 5–17)
BUN SERPL-MCNC: 13 MG/DL — SIGNIFICANT CHANGE UP (ref 7–23)
CALCIUM SERPL-MCNC: 8.5 MG/DL — SIGNIFICANT CHANGE UP (ref 8.5–10.1)
CHLORIDE SERPL-SCNC: 111 MMOL/L — HIGH (ref 96–108)
CO2 SERPL-SCNC: 26 MMOL/L — SIGNIFICANT CHANGE UP (ref 22–31)
CREAT SERPL-MCNC: 0.77 MG/DL — SIGNIFICANT CHANGE UP (ref 0.5–1.3)
EGFR: 79 ML/MIN/1.73M2 — SIGNIFICANT CHANGE UP
GLUCOSE SERPL-MCNC: 114 MG/DL — HIGH (ref 70–99)
POTASSIUM SERPL-MCNC: 3.3 MMOL/L — LOW (ref 3.5–5.3)
POTASSIUM SERPL-SCNC: 3.3 MMOL/L — LOW (ref 3.5–5.3)
SODIUM SERPL-SCNC: 144 MMOL/L — SIGNIFICANT CHANGE UP (ref 135–145)

## 2022-10-15 PROCEDURE — 99232 SBSQ HOSP IP/OBS MODERATE 35: CPT

## 2022-10-15 RX ORDER — POTASSIUM CHLORIDE 20 MEQ
40 PACKET (EA) ORAL ONCE
Refills: 0 | Status: COMPLETED | OUTPATIENT
Start: 2022-10-15 | End: 2022-10-15

## 2022-10-15 RX ADMIN — POLYETHYLENE GLYCOL 3350 17 GRAM(S): 17 POWDER, FOR SOLUTION ORAL at 11:19

## 2022-10-15 RX ADMIN — APIXABAN 5 MILLIGRAM(S): 2.5 TABLET, FILM COATED ORAL at 05:25

## 2022-10-15 RX ADMIN — APIXABAN 5 MILLIGRAM(S): 2.5 TABLET, FILM COATED ORAL at 17:07

## 2022-10-15 RX ADMIN — SENNA PLUS 2 TABLET(S): 8.6 TABLET ORAL at 22:15

## 2022-10-15 RX ADMIN — MEMANTINE HYDROCHLORIDE 5 MILLIGRAM(S): 10 TABLET ORAL at 17:07

## 2022-10-15 RX ADMIN — ESCITALOPRAM OXALATE 10 MILLIGRAM(S): 10 TABLET, FILM COATED ORAL at 11:19

## 2022-10-15 RX ADMIN — Medication 3 MILLIGRAM(S): at 22:15

## 2022-10-15 RX ADMIN — TAMSULOSIN HYDROCHLORIDE 0.4 MILLIGRAM(S): 0.4 CAPSULE ORAL at 22:16

## 2022-10-15 RX ADMIN — CARVEDILOL PHOSPHATE 3.12 MILLIGRAM(S): 80 CAPSULE, EXTENDED RELEASE ORAL at 17:07

## 2022-10-15 RX ADMIN — CARVEDILOL PHOSPHATE 3.12 MILLIGRAM(S): 80 CAPSULE, EXTENDED RELEASE ORAL at 05:25

## 2022-10-15 RX ADMIN — Medication 40 MILLIEQUIVALENT(S): at 18:51

## 2022-10-15 RX ADMIN — MEMANTINE HYDROCHLORIDE 5 MILLIGRAM(S): 10 TABLET ORAL at 05:24

## 2022-10-15 NOTE — PROGRESS NOTE ADULT - SUBJECTIVE AND OBJECTIVE BOX
INTERVAL HPI/OVERNIGHT EVENTS:  Pt seen and examined at bedside.     Allergies/Intolerance: No Known Allergies      MEDICATIONS  (STANDING):  apixaban 5 milliGRAM(s) Oral every 12 hours  carvedilol 3.125 milliGRAM(s) Oral every 12 hours  escitalopram 10 milliGRAM(s) Oral daily  memantine 5 milliGRAM(s) Oral two times a day  polyethylene glycol 3350 17 Gram(s) Oral daily  senna 2 Tablet(s) Oral at bedtime  tamsulosin 0.4 milliGRAM(s) Oral at bedtime    MEDICATIONS  (PRN):  ALBUTerol    90 MICROgram(s) HFA Inhaler 1 Puff(s) Inhalation every 6 hours PRN Shortness of Breath and/or Wheezing  guaiFENesin Oral Liquid (Sugar-Free) 200 milliGRAM(s) Oral every 6 hours PRN Cough  melatonin 3 milliGRAM(s) Oral at bedtime PRN Insomnia  morphine  - Injectable 1 milliGRAM(s) IV Push every 4 hours PRN For Moderate Pain (4 - 6)        ROS: all systems reviewed and wnl      PHYSICAL EXAMINATION:  Vital Signs Last 24 Hrs  T(C): 37 (15 Oct 2022 05:07), Max: 37 (15 Oct 2022 05:07)  T(F): 98.6 (15 Oct 2022 05:07), Max: 98.6 (15 Oct 2022 05:07)  HR: 93 (15 Oct 2022 05:07) (73 - 98)  BP: 134/76 (15 Oct 2022 05:07) (113/74 - 146/67)  BP(mean): --  RR: 18 (15 Oct 2022 05:07) (17 - 18)  SpO2: 97% (15 Oct 2022 05:07) (95% - 97%)    Parameters below as of 15 Oct 2022 05:07  Patient On (Oxygen Delivery Method): room air      CAPILLARY BLOOD GLUCOSE          10-14 @ 07:01  -  10-15 @ 07:00  --------------------------------------------------------  IN: 1420 mL / OUT: 2 mL / NET: 1418 mL        GENERAL: stable, in bed, diet to full, no fevers, off IVF  NECK: supple, No JVD  CHEST/LUNG: clear to auscultation bilaterally; no rales, rhonchi, or wheezing b/l  HEART: normal S1, S2  ABDOMEN: BS+, soft, ND, NT   EXTREMITIES:  pulses palpable; no clubbing, cyanosis, or edema b/l LEs        LABS:                        12.6   4.94  )-----------( 234      ( 14 Oct 2022 10:05 )             38.9     10-15    144  |  111<H>  |  13  ----------------------------<  114<H>  3.3<L>   |  26  |  0.77    Ca    8.5      15 Oct 2022 06:32    TPro  6.5  /  Alb  2.2<L>  /  TBili  1.9<H>  /  DBili  x   /  AST  38<H>  /  ALT  35  /  AlkPhos  688<H>  10-14

## 2022-10-15 NOTE — PROGRESS NOTE ADULT - ASSESSMENT
89yo Male w PMHx of afib on eliquis, PVD, Dementia, HTN presents with abdominal pain in RUQ and epigastric region.       #Choledocolithiasis  - CT abdomen reveals choledocolithiasis with bile duct dilatation  - F/u MRCP  - Please consult GI in AM  - Surgery consulted.     Advance to full liquid diet.        #Hypernatremia  - Na of 153, now better 148. D5W down to 75.   - Follow Na, do not correct more than 10 in 24h span.    #Constipation  - Constipation on CT abdomen  - Miralax and senna    #A-fib  - Continue home eliquis and carvedilol  - Rate controlled on Tele  - Tele monitoring    Code: Full  VTE: Eliquis  Diet: NPO 89yo Male w PMHx of afib on eliquis, PVD, Dementia, HTN presents with abdominal pain in RUQ and epigastric region.       #Choledocolithiasis: CT abdomen reveals choledocolithiasis with bile duct dilatation  - F/u MRCP, reading is pending. Advance to regular diet, no more abdominal pain.          #Hypernatremia  - Na of 153, now better 144. Sto D5W.     #Constipation  - Constipation on CT abdomen  - Miralax and senna    #A-fib  - Continue home eliquis and carvedilol  - Rate controlled on Tele  - Tele monitoring    Code: Full  VTE: Eliquis  Diet: NPO

## 2022-10-16 LAB
-  AMIKACIN: SIGNIFICANT CHANGE UP
-  AMOXICILLIN/CLAVULANIC ACID: SIGNIFICANT CHANGE UP
-  AMPICILLIN/SULBACTAM: SIGNIFICANT CHANGE UP
-  AMPICILLIN: SIGNIFICANT CHANGE UP
-  AZTREONAM: SIGNIFICANT CHANGE UP
-  CEFAZOLIN: SIGNIFICANT CHANGE UP
-  CEFEPIME: SIGNIFICANT CHANGE UP
-  CEFOXITIN: SIGNIFICANT CHANGE UP
-  CEFTRIAXONE: SIGNIFICANT CHANGE UP
-  CIPROFLOXACIN: SIGNIFICANT CHANGE UP
-  ERTAPENEM: SIGNIFICANT CHANGE UP
-  GENTAMICIN: SIGNIFICANT CHANGE UP
-  LEVOFLOXACIN: SIGNIFICANT CHANGE UP
-  MEROPENEM: SIGNIFICANT CHANGE UP
-  NITROFURANTOIN: SIGNIFICANT CHANGE UP
-  PIPERACILLIN/TAZOBACTAM: SIGNIFICANT CHANGE UP
-  TOBRAMYCIN: SIGNIFICANT CHANGE UP
-  TRIMETHOPRIM/SULFAMETHOXAZOLE: SIGNIFICANT CHANGE UP
ANION GAP SERPL CALC-SCNC: 6 MMOL/L — SIGNIFICANT CHANGE UP (ref 5–17)
BUN SERPL-MCNC: 13 MG/DL — SIGNIFICANT CHANGE UP (ref 7–23)
CALCIUM SERPL-MCNC: 8.2 MG/DL — LOW (ref 8.5–10.1)
CHLORIDE SERPL-SCNC: 113 MMOL/L — HIGH (ref 96–108)
CO2 SERPL-SCNC: 25 MMOL/L — SIGNIFICANT CHANGE UP (ref 22–31)
CREAT SERPL-MCNC: 0.68 MG/DL — SIGNIFICANT CHANGE UP (ref 0.5–1.3)
CULTURE RESULTS: SIGNIFICANT CHANGE UP
EGFR: 82 ML/MIN/1.73M2 — SIGNIFICANT CHANGE UP
GLUCOSE SERPL-MCNC: 107 MG/DL — HIGH (ref 70–99)
METHOD TYPE: SIGNIFICANT CHANGE UP
ORGANISM # SPEC MICROSCOPIC CNT: SIGNIFICANT CHANGE UP
ORGANISM # SPEC MICROSCOPIC CNT: SIGNIFICANT CHANGE UP
POTASSIUM SERPL-MCNC: 3.7 MMOL/L — SIGNIFICANT CHANGE UP (ref 3.5–5.3)
POTASSIUM SERPL-SCNC: 3.7 MMOL/L — SIGNIFICANT CHANGE UP (ref 3.5–5.3)
SODIUM SERPL-SCNC: 144 MMOL/L — SIGNIFICANT CHANGE UP (ref 135–145)
SPECIMEN SOURCE: SIGNIFICANT CHANGE UP

## 2022-10-16 PROCEDURE — 99232 SBSQ HOSP IP/OBS MODERATE 35: CPT

## 2022-10-16 RX ADMIN — CARVEDILOL PHOSPHATE 3.12 MILLIGRAM(S): 80 CAPSULE, EXTENDED RELEASE ORAL at 07:39

## 2022-10-16 RX ADMIN — MEMANTINE HYDROCHLORIDE 5 MILLIGRAM(S): 10 TABLET ORAL at 17:13

## 2022-10-16 RX ADMIN — APIXABAN 5 MILLIGRAM(S): 2.5 TABLET, FILM COATED ORAL at 17:13

## 2022-10-16 RX ADMIN — SENNA PLUS 2 TABLET(S): 8.6 TABLET ORAL at 21:56

## 2022-10-16 RX ADMIN — CARVEDILOL PHOSPHATE 3.12 MILLIGRAM(S): 80 CAPSULE, EXTENDED RELEASE ORAL at 17:13

## 2022-10-16 RX ADMIN — MEMANTINE HYDROCHLORIDE 5 MILLIGRAM(S): 10 TABLET ORAL at 05:20

## 2022-10-16 RX ADMIN — POLYETHYLENE GLYCOL 3350 17 GRAM(S): 17 POWDER, FOR SOLUTION ORAL at 12:19

## 2022-10-16 RX ADMIN — ESCITALOPRAM OXALATE 10 MILLIGRAM(S): 10 TABLET, FILM COATED ORAL at 12:19

## 2022-10-16 RX ADMIN — ALBUTEROL 1 PUFF(S): 90 AEROSOL, METERED ORAL at 18:47

## 2022-10-16 RX ADMIN — Medication 3 MILLIGRAM(S): at 21:54

## 2022-10-16 RX ADMIN — APIXABAN 5 MILLIGRAM(S): 2.5 TABLET, FILM COATED ORAL at 05:20

## 2022-10-16 RX ADMIN — TAMSULOSIN HYDROCHLORIDE 0.4 MILLIGRAM(S): 0.4 CAPSULE ORAL at 21:55

## 2022-10-16 NOTE — PROGRESS NOTE ADULT - ASSESSMENT
89yo Male w PMHx of afib on eliquis, PVD, Dementia, HTN presents with abdominal pain in RUQ and epigastric region.       #Choledocolithiasis: CT abdomen reveals choledocolithiasis with bile duct dilatation  - F/u MRCP, reading is pending. Advance to regular diet, no more abdominal pain.          #Hypernatremia  - Na of 153, now better 144. Sto D5W.     #Constipation  - Constipation on CT abdomen  - Miralax and senna    #A-fib  - Continue home eliquis and carvedilol  - Rate controlled on Tele  - Tele monitoring    Code: Full  VTE: Eliquis  Diet: NPO 89yo Male w PMHx of afib on eliquis, PVD, Dementia, HTN presents with abdominal pain in RUQ and epigastric region.       #Choledocolithiasis: CT abdomen reveals choledocolithiasis with bile duct dilatation  - F/u MRCP, reading is pending. Advance to puree, no more abdominal pain.          #Hypernatremia: Na of 153, now better 144. Stop D5W.     #Constipation: Miralax and senna    #A-fib: Continue home eliquis and carvedilol. Rate controlled on Tele.     Code: Full  VTE: Eliquis

## 2022-10-16 NOTE — PROGRESS NOTE ADULT - SUBJECTIVE AND OBJECTIVE BOX
INTERVAL HPI/OVERNIGHT EVENTS:  Pt seen and examined at bedside.     Allergies/Intolerance: No Known Allergies      MEDICATIONS  (STANDING):  apixaban 5 milliGRAM(s) Oral every 12 hours  carvedilol 3.125 milliGRAM(s) Oral every 12 hours  escitalopram 10 milliGRAM(s) Oral daily  memantine 5 milliGRAM(s) Oral two times a day  polyethylene glycol 3350 17 Gram(s) Oral daily  senna 2 Tablet(s) Oral at bedtime  tamsulosin 0.4 milliGRAM(s) Oral at bedtime    MEDICATIONS  (PRN):  ALBUTerol    90 MICROgram(s) HFA Inhaler 1 Puff(s) Inhalation every 6 hours PRN Shortness of Breath and/or Wheezing  melatonin 3 milliGRAM(s) Oral at bedtime PRN Insomnia  morphine  - Injectable 1 milliGRAM(s) IV Push every 4 hours PRN For Moderate Pain (4 - 6)        ROS: all systems reviewed and wnl      PHYSICAL EXAMINATION:  Vital Signs Last 24 Hrs  T(C): 36.6 (16 Oct 2022 04:34), Max: 37 (15 Oct 2022 16:54)  T(F): 97.9 (16 Oct 2022 04:34), Max: 98.6 (15 Oct 2022 16:54)  HR: 102 (16 Oct 2022 04:34) (75 - 102)  BP: 114/65 (16 Oct 2022 04:34) (114/65 - 136/74)  BP(mean): --  RR: 18 (16 Oct 2022 04:34) (17 - 18)  SpO2: 98% (16 Oct 2022 04:34) (97% - 98%)    Parameters below as of 16 Oct 2022 04:34  Patient On (Oxygen Delivery Method): room air      CAPILLARY BLOOD GLUCOSE          10-15 @ 07:01  -  10-16 @ 07:00  --------------------------------------------------------  IN: 120 mL / OUT: 0 mL / NET: 120 mL        GENERAL: comfortable on RA, tolerates puree diet well, no more abdominal pain or fevers.   NECK: supple, No JVD  CHEST/LUNG: clear to auscultation bilaterally; no rales, rhonchi, or wheezing b/l  HEART: normal S1, S2  ABDOMEN: BS+, soft, ND, NT   EXTREMITIES:  pulses palpable; no clubbing, cyanosis, or edema b/l LEs        LABS:                        12.6   4.94  )-----------( 234      ( 14 Oct 2022 10:05 )             38.9     10-16    144  |  113<H>  |  13  ----------------------------<  107<H>  3.7   |  25  |  0.68    Ca    8.2<L>      16 Oct 2022 06:10    TPro  6.5  /  Alb  2.2<L>  /  TBili  1.9<H>  /  DBili  x   /  AST  38<H>  /  ALT  35  /  AlkPhos  688<H>  10-14

## 2022-10-17 PROCEDURE — 99232 SBSQ HOSP IP/OBS MODERATE 35: CPT

## 2022-10-17 RX ADMIN — MEMANTINE HYDROCHLORIDE 5 MILLIGRAM(S): 10 TABLET ORAL at 05:39

## 2022-10-17 RX ADMIN — APIXABAN 5 MILLIGRAM(S): 2.5 TABLET, FILM COATED ORAL at 17:59

## 2022-10-17 RX ADMIN — CARVEDILOL PHOSPHATE 3.12 MILLIGRAM(S): 80 CAPSULE, EXTENDED RELEASE ORAL at 05:38

## 2022-10-17 RX ADMIN — Medication 3 MILLIGRAM(S): at 22:12

## 2022-10-17 RX ADMIN — TAMSULOSIN HYDROCHLORIDE 0.4 MILLIGRAM(S): 0.4 CAPSULE ORAL at 22:12

## 2022-10-17 RX ADMIN — CARVEDILOL PHOSPHATE 3.12 MILLIGRAM(S): 80 CAPSULE, EXTENDED RELEASE ORAL at 18:00

## 2022-10-17 RX ADMIN — POLYETHYLENE GLYCOL 3350 17 GRAM(S): 17 POWDER, FOR SOLUTION ORAL at 22:12

## 2022-10-17 RX ADMIN — ESCITALOPRAM OXALATE 10 MILLIGRAM(S): 10 TABLET, FILM COATED ORAL at 13:11

## 2022-10-17 RX ADMIN — SENNA PLUS 2 TABLET(S): 8.6 TABLET ORAL at 22:12

## 2022-10-17 RX ADMIN — MEMANTINE HYDROCHLORIDE 5 MILLIGRAM(S): 10 TABLET ORAL at 18:00

## 2022-10-17 RX ADMIN — APIXABAN 5 MILLIGRAM(S): 2.5 TABLET, FILM COATED ORAL at 05:38

## 2022-10-17 NOTE — PROGRESS NOTE ADULT - ASSESSMENT
89yo Male w PMHx of afib on eliquis, PVD, Dementia, HTN presents with abdominal pain in RUQ and epigastric region.       #Choledocolithiasis: CT abdomen reveals choledocolithiasis with bile duct dilatation  - F/u MRCP, reading is pending. Advance to puree, no more abdominal pain.          #Hypernatremia: Na of 153, now better 144. Stop D5W.     #Constipation: Miralax and senna    #A-fib: Continue home eliquis and carvedilol. Rate controlled on Tele.     Code: Full  VTE: Eliquis   89yo Male w PMHx of afib on eliquis, PVD, Dementia, HTN presents with abdominal pain in RUQ and epigastric region.       #Choledocolithiasis: CT abdomen reveals choledocolithiasis with bile duct dilatation  - F/u MRCP, reading is pending. Advance to puree, no more abdominal pain.          #Hypernatremia: Na of 153, now better 144. Stop D5W.     #Constipation: Miralax and senna    #A-fib: Continue home eliquis and carvedilol. Rate controlled on Tele.     Code: Full  VTE: Eliquis    Discharge to new LTC facility per family request.    89yo Male w PMHx of afib on eliquis, PVD, Dementia, HTN presents with abdominal pain in RUQ and epigastric region.       #Choledocolithiasis: CT abdomen reveals choledocolithiasis with bile duct dilatation. Await final MRCP reading.     Advanced to puree, no more abdominal pain.        #Hypernatremia: Na of 153, now better 144. Stop D5W.     #Constipation: Miralax and senna    #A-fib: Continue home eliquis and carvedilol. Rate controlled on Tele.     Code: Full  VTE: Eliquis    Discharge to new LTC facility per family request.

## 2022-10-18 LAB
ALBUMIN SERPL ELPH-MCNC: 2.2 G/DL — LOW (ref 3.3–5)
ALP SERPL-CCNC: 591 U/L — HIGH (ref 40–120)
ALT FLD-CCNC: 27 U/L — SIGNIFICANT CHANGE UP (ref 12–78)
ANION GAP SERPL CALC-SCNC: 5 MMOL/L — SIGNIFICANT CHANGE UP (ref 5–17)
AST SERPL-CCNC: 23 U/L — SIGNIFICANT CHANGE UP (ref 15–37)
BILIRUB SERPL-MCNC: 1.4 MG/DL — HIGH (ref 0.2–1.2)
BUN SERPL-MCNC: 10 MG/DL — SIGNIFICANT CHANGE UP (ref 7–23)
CALCIUM SERPL-MCNC: 8.3 MG/DL — LOW (ref 8.5–10.1)
CHLORIDE SERPL-SCNC: 111 MMOL/L — HIGH (ref 96–108)
CO2 SERPL-SCNC: 26 MMOL/L — SIGNIFICANT CHANGE UP (ref 22–31)
CREAT SERPL-MCNC: 0.71 MG/DL — SIGNIFICANT CHANGE UP (ref 0.5–1.3)
EGFR: 81 ML/MIN/1.73M2 — SIGNIFICANT CHANGE UP
FLUAV AG NPH QL: SIGNIFICANT CHANGE UP
FLUBV AG NPH QL: SIGNIFICANT CHANGE UP
GLUCOSE SERPL-MCNC: 129 MG/DL — HIGH (ref 70–99)
POTASSIUM SERPL-MCNC: 3.5 MMOL/L — SIGNIFICANT CHANGE UP (ref 3.5–5.3)
POTASSIUM SERPL-SCNC: 3.5 MMOL/L — SIGNIFICANT CHANGE UP (ref 3.5–5.3)
PROT SERPL-MCNC: 6.6 GM/DL — SIGNIFICANT CHANGE UP (ref 6–8.3)
RAPID RVP RESULT: DETECTED
RV+EV RNA SPEC QL NAA+PROBE: DETECTED
SARS-COV-2 RNA SPEC QL NAA+PROBE: SIGNIFICANT CHANGE UP
SARS-COV-2 RNA SPEC QL NAA+PROBE: SIGNIFICANT CHANGE UP
SODIUM SERPL-SCNC: 142 MMOL/L — SIGNIFICANT CHANGE UP (ref 135–145)

## 2022-10-18 PROCEDURE — 76700 US EXAM ABDOM COMPLETE: CPT | Mod: 26

## 2022-10-18 PROCEDURE — 99233 SBSQ HOSP IP/OBS HIGH 50: CPT

## 2022-10-18 PROCEDURE — 71045 X-RAY EXAM CHEST 1 VIEW: CPT | Mod: 26

## 2022-10-18 RX ADMIN — APIXABAN 5 MILLIGRAM(S): 2.5 TABLET, FILM COATED ORAL at 17:40

## 2022-10-18 RX ADMIN — POLYETHYLENE GLYCOL 3350 17 GRAM(S): 17 POWDER, FOR SOLUTION ORAL at 12:32

## 2022-10-18 RX ADMIN — Medication 100 MILLIGRAM(S): at 17:40

## 2022-10-18 RX ADMIN — TAMSULOSIN HYDROCHLORIDE 0.4 MILLIGRAM(S): 0.4 CAPSULE ORAL at 22:09

## 2022-10-18 RX ADMIN — APIXABAN 5 MILLIGRAM(S): 2.5 TABLET, FILM COATED ORAL at 06:01

## 2022-10-18 RX ADMIN — MEMANTINE HYDROCHLORIDE 5 MILLIGRAM(S): 10 TABLET ORAL at 17:40

## 2022-10-18 RX ADMIN — Medication 100 MILLIGRAM(S): at 06:00

## 2022-10-18 RX ADMIN — MEMANTINE HYDROCHLORIDE 5 MILLIGRAM(S): 10 TABLET ORAL at 06:01

## 2022-10-18 RX ADMIN — ESCITALOPRAM OXALATE 10 MILLIGRAM(S): 10 TABLET, FILM COATED ORAL at 12:32

## 2022-10-18 RX ADMIN — SENNA PLUS 2 TABLET(S): 8.6 TABLET ORAL at 22:09

## 2022-10-18 RX ADMIN — ALBUTEROL 1 PUFF(S): 90 AEROSOL, METERED ORAL at 06:00

## 2022-10-18 RX ADMIN — CARVEDILOL PHOSPHATE 3.12 MILLIGRAM(S): 80 CAPSULE, EXTENDED RELEASE ORAL at 17:40

## 2022-10-18 RX ADMIN — Medication 3 MILLIGRAM(S): at 22:09

## 2022-10-18 RX ADMIN — CARVEDILOL PHOSPHATE 3.12 MILLIGRAM(S): 80 CAPSULE, EXTENDED RELEASE ORAL at 06:01

## 2022-10-18 NOTE — PROGRESS NOTE ADULT - ASSESSMENT
89yo Male w PMHx of afib on eliquis, PVD, Dementia, HTN presents with abdominal pain in RUQ and epigastric region.       #Choledocolithiasis:   CT abdomen reveals choledocolithiasis with bile duct dilatation. Unable to do MRCP, will obtain US as pt still has some tenderness, repeat lab  Continue PO diet now    #Hypernatremia:   Improved after D5W, off IVF now    #Constipation:  Miralax and senna    #A-fib:  Continue home eliquis and carvedilol. Rate controlled    Code: Full  VTE: Eliquis    Discharge to new LTC facility per family request.

## 2022-10-18 NOTE — PROGRESS NOTE ADULT - SUBJECTIVE AND OBJECTIVE BOX
Patient is a 89y old  Male who presents with a chief complaint of Choledocolithiasis (17 Oct 2022 09:10)      INTERVAL HPI/OVERNIGHT EVENTS:  Pt was seen and examined, no acute events.      MEDICATIONS  (STANDING):  apixaban 5 milliGRAM(s) Oral every 12 hours  carvedilol 3.125 milliGRAM(s) Oral every 12 hours  escitalopram 10 milliGRAM(s) Oral daily  memantine 5 milliGRAM(s) Oral two times a day  polyethylene glycol 3350 17 Gram(s) Oral daily  senna 2 Tablet(s) Oral at bedtime  tamsulosin 0.4 milliGRAM(s) Oral at bedtime    MEDICATIONS  (PRN):  ALBUTerol    90 MICROgram(s) HFA Inhaler 1 Puff(s) Inhalation every 6 hours PRN Shortness of Breath and/or Wheezing  guaiFENesin Oral Liquid (Sugar-Free) 100 milliGRAM(s) Oral every 6 hours PRN Cough  melatonin 3 milliGRAM(s) Oral at bedtime PRN Insomnia      Allergies  No Known Allergies      Vital Signs Last 24 Hrs  T(C): 36.9 (18 Oct 2022 10:21), Max: 37 (18 Oct 2022 00:03)  T(F): 98.5 (18 Oct 2022 10:21), Max: 98.6 (18 Oct 2022 00:03)  HR: 76 (18 Oct 2022 10:21) (76 - 90)  BP: 148/63 (18 Oct 2022 10:21) (120/59 - 156/76)  BP(mean): --  RR: 19 (18 Oct 2022 10:21) (17 - 20)  SpO2: 99% (18 Oct 2022 10:21) (95% - 99%)    Parameters below as of 18 Oct 2022 10:21  Patient On (Oxygen Delivery Method): room air        PHYSICAL EXAM:  GENERAL: NAD  HEAD:  Atraumatic  EYES: PERRLA  ENMT: Mouth moist  NECK: Supple  NERVOUS SYSTEM:  Awake, alert, non focal  CHEST/LUNG: Clear  HEART: RRR, S1, S2  ABDOMEN: Soft, mild RUQ tenderness, + BS  EXTREMITIES:  No edema B/L  SKIN: No rash        LABS:          CAPILLARY BLOOD GLUCOSE          RADIOLOGY & ADDITIONAL TESTS:    Imaging Personally Reviewed:  [ ] YES  [ ] NO    Consultant(s) Notes Reviewed:  [ ] YES  [ ] NO    Care Discussed with Consultants/Other Providers [ ] YES  [ ] NO

## 2022-10-19 LAB
ALBUMIN SERPL ELPH-MCNC: 2.1 G/DL — LOW (ref 3.3–5)
ALP SERPL-CCNC: 580 U/L — HIGH (ref 40–120)
ALT FLD-CCNC: 25 U/L — SIGNIFICANT CHANGE UP (ref 12–78)
ANION GAP SERPL CALC-SCNC: 6 MMOL/L — SIGNIFICANT CHANGE UP (ref 5–17)
AST SERPL-CCNC: 21 U/L — SIGNIFICANT CHANGE UP (ref 15–37)
BILIRUB SERPL-MCNC: 1.2 MG/DL — SIGNIFICANT CHANGE UP (ref 0.2–1.2)
BUN SERPL-MCNC: 9 MG/DL — SIGNIFICANT CHANGE UP (ref 7–23)
CALCIUM SERPL-MCNC: 8.1 MG/DL — LOW (ref 8.5–10.1)
CHLORIDE SERPL-SCNC: 109 MMOL/L — HIGH (ref 96–108)
CO2 SERPL-SCNC: 25 MMOL/L — SIGNIFICANT CHANGE UP (ref 22–31)
CREAT SERPL-MCNC: 0.64 MG/DL — SIGNIFICANT CHANGE UP (ref 0.5–1.3)
EGFR: 83 ML/MIN/1.73M2 — SIGNIFICANT CHANGE UP
GLUCOSE SERPL-MCNC: 106 MG/DL — HIGH (ref 70–99)
HCT VFR BLD CALC: 37 % — LOW (ref 39–50)
HGB BLD-MCNC: 12 G/DL — LOW (ref 13–17)
MCHC RBC-ENTMCNC: 26.1 PG — LOW (ref 27–34)
MCHC RBC-ENTMCNC: 32.4 G/DL — SIGNIFICANT CHANGE UP (ref 32–36)
MCV RBC AUTO: 80.4 FL — SIGNIFICANT CHANGE UP (ref 80–100)
NRBC # BLD: 0 /100 WBCS — SIGNIFICANT CHANGE UP (ref 0–0)
PLATELET # BLD AUTO: 336 K/UL — SIGNIFICANT CHANGE UP (ref 150–400)
POTASSIUM SERPL-MCNC: 3.6 MMOL/L — SIGNIFICANT CHANGE UP (ref 3.5–5.3)
POTASSIUM SERPL-SCNC: 3.6 MMOL/L — SIGNIFICANT CHANGE UP (ref 3.5–5.3)
PROT SERPL-MCNC: 6.4 GM/DL — SIGNIFICANT CHANGE UP (ref 6–8.3)
RBC # BLD: 4.6 M/UL — SIGNIFICANT CHANGE UP (ref 4.2–5.8)
RBC # FLD: 19.4 % — HIGH (ref 10.3–14.5)
SODIUM SERPL-SCNC: 140 MMOL/L — SIGNIFICANT CHANGE UP (ref 135–145)
WBC # BLD: 5.57 K/UL — SIGNIFICANT CHANGE UP (ref 3.8–10.5)
WBC # FLD AUTO: 5.57 K/UL — SIGNIFICANT CHANGE UP (ref 3.8–10.5)

## 2022-10-19 PROCEDURE — 99232 SBSQ HOSP IP/OBS MODERATE 35: CPT

## 2022-10-19 RX ADMIN — ESCITALOPRAM OXALATE 10 MILLIGRAM(S): 10 TABLET, FILM COATED ORAL at 11:23

## 2022-10-19 RX ADMIN — MEMANTINE HYDROCHLORIDE 5 MILLIGRAM(S): 10 TABLET ORAL at 17:19

## 2022-10-19 RX ADMIN — APIXABAN 5 MILLIGRAM(S): 2.5 TABLET, FILM COATED ORAL at 06:06

## 2022-10-19 RX ADMIN — TAMSULOSIN HYDROCHLORIDE 0.4 MILLIGRAM(S): 0.4 CAPSULE ORAL at 21:20

## 2022-10-19 RX ADMIN — CARVEDILOL PHOSPHATE 3.12 MILLIGRAM(S): 80 CAPSULE, EXTENDED RELEASE ORAL at 17:19

## 2022-10-19 RX ADMIN — Medication 100 MILLIGRAM(S): at 06:03

## 2022-10-19 RX ADMIN — MEMANTINE HYDROCHLORIDE 5 MILLIGRAM(S): 10 TABLET ORAL at 06:03

## 2022-10-19 RX ADMIN — ALBUTEROL 1 PUFF(S): 90 AEROSOL, METERED ORAL at 06:05

## 2022-10-19 RX ADMIN — POLYETHYLENE GLYCOL 3350 17 GRAM(S): 17 POWDER, FOR SOLUTION ORAL at 11:23

## 2022-10-19 RX ADMIN — SENNA PLUS 2 TABLET(S): 8.6 TABLET ORAL at 21:19

## 2022-10-19 RX ADMIN — CARVEDILOL PHOSPHATE 3.12 MILLIGRAM(S): 80 CAPSULE, EXTENDED RELEASE ORAL at 06:04

## 2022-10-19 RX ADMIN — APIXABAN 5 MILLIGRAM(S): 2.5 TABLET, FILM COATED ORAL at 17:19

## 2022-10-19 NOTE — PROGRESS NOTE ADULT - SUBJECTIVE AND OBJECTIVE BOX
INTERVAL HPI/OVERNIGHT EVENTS:  Pt was seen and examined, no acute events. Patient denies abdominal pain. Denies fever chilld  VItals stable afebrile for dc planning    MEDICATIONS  (STANDING):  apixaban 5 milliGRAM(s) Oral every 12 hours  carvedilol 3.125 milliGRAM(s) Oral every 12 hours  escitalopram 10 milliGRAM(s) Oral daily  memantine 5 milliGRAM(s) Oral two times a day  polyethylene glycol 3350 17 Gram(s) Oral daily  senna 2 Tablet(s) Oral at bedtime  tamsulosin 0.4 milliGRAM(s) Oral at bedtime    MEDICATIONS  (PRN):  ALBUTerol    90 MICROgram(s) HFA Inhaler 1 Puff(s) Inhalation every 6 hours PRN Shortness of Breath and/or Wheezing  guaiFENesin Oral Liquid (Sugar-Free) 100 milliGRAM(s) Oral every 6 hours PRN Cough  melatonin 3 milliGRAM(s) Oral at bedtime PRN Insomnia      Allergies  No Known Allergies      Vital Signs Last 24 Hrs  T(C): 36.9 (18 Oct 2022 10:21), Max: 37 (18 Oct 2022 00:03)  T(F): 98.5 (18 Oct 2022 10:21), Max: 98.6 (18 Oct 2022 00:03)  HR: 76 (18 Oct 2022 10:21) (76 - 90)  BP: 148/63 (18 Oct 2022 10:21) (120/59 - 156/76)  BP(mean): --  RR: 19 (18 Oct 2022 10:21) (17 - 20)  SpO2: 99% (18 Oct 2022 10:21) (95% - 99%)    Parameters below as of 18 Oct 2022 10:21  Patient On (Oxygen Delivery Method): room air        PHYSICAL EXAM:  GENERAL: NAD  HEAD:  Atraumatic  EYES: PERRLA  ENMT: Mouth moist  NECK: Supple  NERVOUS SYSTEM:  Awake, alert, non focal  CHEST/LUNG: Clear  HEART: RRR, S1, S2  ABDOMEN: Soft, mild RUQ tenderness, + BS  EXTREMITIES:  No edema B/L  SKIN: No rash        LABS:          CAPILLARY BLOOD GLUCOSE          RADIOLOGY & ADDITIONAL TESTS:    Imaging Personally Reviewed:  [ ] YES  [ ] NO    Consultant(s) Notes Reviewed:  [ ] YES  [ ] NO    Care Discussed with Consultants/Other Providers [ ] YES  [ ] NO

## 2022-10-19 NOTE — PROGRESS NOTE ADULT - ASSESSMENT
89yo Male w PMHx of afib on eliquis, PVD, Dementia, HTN presents with abdominal pain in RUQ and epigastric region.       #Choledocolithiasis:   CT abdomen reveals choledocolithiasis with bile duct dilatation. Unable to do MRCP,  US benign  bellhy benign  Continue PO diet now    #Hypernatremia:   Improved after D5W, off IVF now    #Constipation:  Miralax and senna    #A-fib:  Continue home eliquis and carvedilol. Rate controlled    Code: Full  VTE: Eliquis    Discharge to new LTC facility per family request.

## 2022-10-20 ENCOUNTER — TRANSCRIPTION ENCOUNTER (OUTPATIENT)
Age: 87
End: 2022-10-20

## 2022-10-20 PROCEDURE — 99232 SBSQ HOSP IP/OBS MODERATE 35: CPT

## 2022-10-20 RX ORDER — TAMSULOSIN HYDROCHLORIDE 0.4 MG/1
1 CAPSULE ORAL
Qty: 0 | Refills: 0 | DISCHARGE
Start: 2022-10-20

## 2022-10-20 RX ORDER — ACETAMINOPHEN 500 MG
2 TABLET ORAL
Qty: 0 | Refills: 0 | DISCHARGE

## 2022-10-20 RX ADMIN — ESCITALOPRAM OXALATE 10 MILLIGRAM(S): 10 TABLET, FILM COATED ORAL at 11:19

## 2022-10-20 RX ADMIN — MEMANTINE HYDROCHLORIDE 5 MILLIGRAM(S): 10 TABLET ORAL at 17:40

## 2022-10-20 RX ADMIN — CARVEDILOL PHOSPHATE 3.12 MILLIGRAM(S): 80 CAPSULE, EXTENDED RELEASE ORAL at 17:40

## 2022-10-20 RX ADMIN — SENNA PLUS 2 TABLET(S): 8.6 TABLET ORAL at 21:14

## 2022-10-20 RX ADMIN — CARVEDILOL PHOSPHATE 3.12 MILLIGRAM(S): 80 CAPSULE, EXTENDED RELEASE ORAL at 05:13

## 2022-10-20 RX ADMIN — POLYETHYLENE GLYCOL 3350 17 GRAM(S): 17 POWDER, FOR SOLUTION ORAL at 11:19

## 2022-10-20 RX ADMIN — APIXABAN 5 MILLIGRAM(S): 2.5 TABLET, FILM COATED ORAL at 17:40

## 2022-10-20 RX ADMIN — MEMANTINE HYDROCHLORIDE 5 MILLIGRAM(S): 10 TABLET ORAL at 05:13

## 2022-10-20 RX ADMIN — APIXABAN 5 MILLIGRAM(S): 2.5 TABLET, FILM COATED ORAL at 05:13

## 2022-10-20 RX ADMIN — TAMSULOSIN HYDROCHLORIDE 0.4 MILLIGRAM(S): 0.4 CAPSULE ORAL at 21:14

## 2022-10-20 NOTE — DISCHARGE NOTE NURSING/CASE MANAGEMENT/SOCIAL WORK - NSDCVIVACCINE_GEN_ALL_CORE_FT
COVID-19, mRNA, LNP-S, PF, 30 mcg/0.3 mL dose (Pfizer); 27-Oct-2021 17:40; Julienne Gray (RN); [employer]; [id number] (Exp. Date: 11-Nov-2021); IntraMuscular; Deltoid Left.; 0.3 milliLiter(s);   Influenza, seasonal, injectable; 05-Jan-2014 10:59; Jose Juan Medina (CHARLENE); [id number]; IntraMuscular; Deltoid Left.; 0.5 milliLiter(s);   influenza, injectable, quadrivalent, preservative free; 10-Dec-2014 16:19; Crystal Treadwell (CHARLENE); San[employer]; ; IntraMuscular; Deltoid Right.; 0.5 milliLiter(s);   influenza, injectable, quadrivalent, preservative free; 04-Oct-2019 13:43; Pollo De La Vega (RN); [employer];  (Exp. Date: 30-Jun-2020); IntraMuscular; Deltoid Left.; 0.5 milliLiter(s); VIS (VIS Published: 15-Aug-2019, VIS Presented: 04-Oct-2019);   pneumococcal polysaccharide PPV23; 05-Jan-2014 10:59; Jose Juan Medina (CHARLENE); [id number]; IntraMuscular; Deltoid Right.; 0.5 milliLiter(s);

## 2022-10-20 NOTE — PROGRESS NOTE ADULT - SUBJECTIVE AND OBJECTIVE BOX
Patient seen and examined  feels well  denies any fever chills chest pain shortness of breath  belly benign  US stones + sludge    Review of Systems:  General:denies fever chills, headache, weakness  HEENT: denies blurry vision,diffculty swallowing, difficulty hearing, tinnitus  Cardiovascular: denies chest pain  ,palpitations  Pulmonary:denies shortness of breath, cough, wheezing, hemoptysis  Gastrointestinal: denies abdominal pain, constipation, diarrhea,nausea , vomiting, hematochezia  : denies hematuria, dysuria, or incontinence  Neurological: denies weakness, numbness , tingling, dizziness, tremors  MSK: denies muscle pain, difficulty ambulating, swelling, back pain  skin: denies skin rash, itching, burning, or  skin lesions  Psychiatrical: denies mood disturbances, anxierty, feeling depressed, depression , or difficulty sleeping    Objective:  Vitals  T(C): 37.2 (10-20-22 @ 04:15), Max: 37.2 (10-20-22 @ 04:15)  HR: 95 (10-20-22 @ 04:15) (68 - 95)  BP: 142/81 (10-20-22 @ 04:15) (142/81 - 160/67)  RR: 18 (10-20-22 @ 04:15) (17 - 18)  SpO2: 96% (10-20-22 @ 04:15) (96% - 99%)    Physical Exam:  General: comfortable, no acute distress, elderly  HEENT: Atraumatic, no LAD, trachea midline, PERRLA  Cardiovascular: normal s1s2, no murmurs, gallops or fricition rubs  Pulmonary: clear to ausculation Bilaterally, no wheezing , rhonchi  Gastrointestinal: soft non tender non distended, no masses felt, no organomegally  Muscloskeletal: no lower extremity edema, intact bilateral lower extremity pulses  Neurological: CN II-12 intact. No focal weakness  Psychiatrical: normal mood, cooperative  SKIN: no rash, lesions or ulcers    Labs:                          12.0   5.57  )-----------( 336      ( 19 Oct 2022 10:16 )             37.0     10-19    140  |  109<H>  |  9   ----------------------------<  106<H>  3.6   |  25  |  0.64    Ca    8.1<L>      19 Oct 2022 10:16    TPro  6.4  /  Alb  2.1<L>  /  TBili  1.2  /  DBili  x   /  AST  21  /  ALT  25  /  AlkPhos  580<H>  10-19    LIVER FUNCTIONS - ( 19 Oct 2022 10:16 )  Alb: 2.1 g/dL / Pro: 6.4 gm/dL / ALK PHOS: 580 U/L / ALT: 25 U/L / AST: 21 U/L / GGT: x                 Active Medications  MEDICATIONS  (STANDING):  apixaban 5 milliGRAM(s) Oral every 12 hours  carvedilol 3.125 milliGRAM(s) Oral every 12 hours  escitalopram 10 milliGRAM(s) Oral daily  memantine 5 milliGRAM(s) Oral two times a day  polyethylene glycol 3350 17 Gram(s) Oral daily  senna 2 Tablet(s) Oral at bedtime  tamsulosin 0.4 milliGRAM(s) Oral at bedtime    MEDICATIONS  (PRN):  ALBUTerol    90 MICROgram(s) HFA Inhaler 1 Puff(s) Inhalation every 6 hours PRN Shortness of Breath and/or Wheezing  guaiFENesin Oral Liquid (Sugar-Free) 100 milliGRAM(s) Oral every 6 hours PRN Cough  melatonin 3 milliGRAM(s) Oral at bedtime PRN Insomnia     Patient seen and examined  feels well  denies any fever chills chest pain shortness of breath  belly benign. LFTS trending down well  US stones + sludge    Review of Systems:  General:denies fever chills, headache, weakness  HEENT: denies blurry vision,diffculty swallowing, difficulty hearing, tinnitus  Cardiovascular: denies chest pain  ,palpitations  Pulmonary:denies shortness of breath, cough, wheezing, hemoptysis  Gastrointestinal: denies abdominal pain, constipation, diarrhea,nausea , vomiting, hematochezia  : denies hematuria, dysuria, or incontinence  Neurological: denies weakness, numbness , tingling, dizziness, tremors  MSK: denies muscle pain, difficulty ambulating, swelling, back pain  skin: denies skin rash, itching, burning, or  skin lesions  Psychiatrical: denies mood disturbances, anxierty, feeling depressed, depression , or difficulty sleeping    Objective:  Vitals  T(C): 37.2 (10-20-22 @ 04:15), Max: 37.2 (10-20-22 @ 04:15)  HR: 95 (10-20-22 @ 04:15) (68 - 95)  BP: 142/81 (10-20-22 @ 04:15) (142/81 - 160/67)  RR: 18 (10-20-22 @ 04:15) (17 - 18)  SpO2: 96% (10-20-22 @ 04:15) (96% - 99%)    Physical Exam:  General: comfortable, no acute distress, elderly  HEENT: Atraumatic, no LAD, trachea midline, PERRLA  Cardiovascular: normal s1s2, no murmurs, gallops or fricition rubs  Pulmonary: clear to ausculation Bilaterally, no wheezing , rhonchi  Gastrointestinal: soft non tender non distended, no masses felt, no organomegally  Muscloskeletal: no lower extremity edema, intact bilateral lower extremity pulses  Neurological: CN II-12 intact. No focal weakness  Psychiatrical: normal mood, cooperative  SKIN: no rash, lesions or ulcers    Labs:                          12.0   5.57  )-----------( 336      ( 19 Oct 2022 10:16 )             37.0     10-19    140  |  109<H>  |  9   ----------------------------<  106<H>  3.6   |  25  |  0.64    Ca    8.1<L>      19 Oct 2022 10:16    TPro  6.4  /  Alb  2.1<L>  /  TBili  1.2  /  DBili  x   /  AST  21  /  ALT  25  /  AlkPhos  580<H>  10-19    LIVER FUNCTIONS - ( 19 Oct 2022 10:16 )  Alb: 2.1 g/dL / Pro: 6.4 gm/dL / ALK PHOS: 580 U/L / ALT: 25 U/L / AST: 21 U/L / GGT: x                 Active Medications  MEDICATIONS  (STANDING):  apixaban 5 milliGRAM(s) Oral every 12 hours  carvedilol 3.125 milliGRAM(s) Oral every 12 hours  escitalopram 10 milliGRAM(s) Oral daily  memantine 5 milliGRAM(s) Oral two times a day  polyethylene glycol 3350 17 Gram(s) Oral daily  senna 2 Tablet(s) Oral at bedtime  tamsulosin 0.4 milliGRAM(s) Oral at bedtime    MEDICATIONS  (PRN):  ALBUTerol    90 MICROgram(s) HFA Inhaler 1 Puff(s) Inhalation every 6 hours PRN Shortness of Breath and/or Wheezing  guaiFENesin Oral Liquid (Sugar-Free) 100 milliGRAM(s) Oral every 6 hours PRN Cough  melatonin 3 milliGRAM(s) Oral at bedtime PRN Insomnia

## 2022-10-20 NOTE — DISCHARGE NOTE NURSING/CASE MANAGEMENT/SOCIAL WORK - NSDCFUADDAPPT_GEN_ALL_CORE_FT
please followup with your PCP [facility] Pregnancy And Lactation Text: This medication is Pregnancy Category C and it isn't known if it is safe during pregnancy. It is unknown if it is excreted in breast milk.

## 2022-10-20 NOTE — DISCHARGE NOTE NURSING/CASE MANAGEMENT/SOCIAL WORK - NSDCPEFALRISK_GEN_ALL_CORE
For information on Fall & Injury Prevention, visit: [email or web address] OR  [email or web address] OR  https://www.cdc.gov/steadi/patient.html

## 2022-10-20 NOTE — DISCHARGE NOTE NURSING/CASE MANAGEMENT/SOCIAL WORK - PATIENT PORTAL LINK FT
You can access the FollowMyHealth Patient Portal offered by [facility] by registering at the following website: [email or web address]. By joining [employer]’s FollowMyHealth portal, you will also be able to view your health information using other applications (apps) compatible with our system.

## 2022-10-20 NOTE — DISCHARGE NOTE PROVIDER - NSDCMRMEDTOKEN_GEN_ALL_CORE_FT
*Patient is from [facility]: [phone]  apixaban 5 mg oral tablet: 1 tab(s) orally 2 times a day  ascorbic acid 500 mg oral tablet: 1 tab(s) orally once a day  Calcium 600+D oral tablet: 1 tab(s) orally once a day  carvedilol 3.125 mg oral tablet: 1 tab(s) orally every 12 hours  docusate sodium 100 mg oral capsule: 2 cap(s) orally once a day (at bedtime)  lactulose 10 g/15 mL oral syrup: 30 milliliter(s) orally once a day  megestrol 40 mg/mL oral suspension: 10 milliliter(s) orally once a day  Melatonin 10 mg oral capsule: 1 cap(s) orally once a day (at bedtime), As Needed  Multiple Vitamins oral tablet: 1 tab(s) orally once a day  risperiDONE 0.5 mg oral tablet: 1 tab(s) orally once a day (at bedtime)  senna oral tablet: 2 tab(s) orally once a day (at bedtime)  tamsulosin 0.4 mg oral capsule: 1 cap(s) orally once a day (at bedtime)  Vitamin D3 25 mcg (1000 intl units) oral tablet: 1 tab(s) orally once a day

## 2022-10-20 NOTE — DIETITIAN INITIAL EVALUATION ADULT - PROBLEM SELECTOR PROBLEM 2
This 76 year old female admitted on 1/27 after being discharged the day before after having surgery (robotic assisted bilateral salpingo-oophorectomy and anterior and posterior vaginal repair).  She complained of thick mucus in her throat causing her to be short of breath.  She lives in an apartment in [place] and has a daughter, Marybel Guerrero ([phone]), who lives in the area.  Speech therapy saw the patient today for a swallow eval and recommended NPO except ice chips until a FEES can be done tomorrow.  Dr. Corbett said in rounds that she will have a [employer] suction set up so that the patient can suction the mucous whenever it builds up.    
Constipation

## 2022-10-20 NOTE — DIETITIAN INITIAL EVALUATION ADULT - PERTINENT MEDS FT
MEDICATIONS  (STANDING):  apixaban 5 milliGRAM(s) Oral every 12 hours  carvedilol 3.125 milliGRAM(s) Oral every 12 hours  escitalopram 10 milliGRAM(s) Oral daily  memantine 5 milliGRAM(s) Oral two times a day  polyethylene glycol 3350 17 Gram(s) Oral daily  senna 2 Tablet(s) Oral at bedtime  tamsulosin 0.4 milliGRAM(s) Oral at bedtime    MEDICATIONS  (PRN):  ALBUTerol    90 MICROgram(s) HFA Inhaler 1 Puff(s) Inhalation every 6 hours PRN Shortness of Breath and/or Wheezing  guaiFENesin Oral Liquid (Sugar-Free) 100 milliGRAM(s) Oral every 6 hours PRN Cough  melatonin 3 milliGRAM(s) Oral at bedtime PRN Insomnia

## 2022-10-20 NOTE — DISCHARGE NOTE PROVIDER - NSDCCPCAREPLAN_GEN_ALL_CORE_FT
PRINCIPAL DISCHARGE DIAGNOSIS  Diagnosis: Choledocholithiasis with obstruction  Assessment and Plan of Treatment:   Choledocolithiasis:   CT abdomen reveals choledocolithiasis with bile duct dilatation. Unable to do MRCP,  US + stones and sludge  belly benign  Continue PO diet now  #Hypernatremia:   Improved after D5W, off IVF now  #Constipation:  Miralax and senna  #A-fib:  Continue home eliquis and carvedilol. Rate controlled  Code: Full  VTE: Eliquis  Discharge to new LTC facility per family request.

## 2022-10-20 NOTE — DIETITIAN INITIAL EVALUATION ADULT - OTHER INFO
Pt seen on Medical floor for Length Of Stay w/ Dementia. Adm w/ abd pain in RUQ & epigastric region. Choledocholithiasis w/ bile duct & sludge. Pt on p.o. diet and tolerating > 50% meals. Constipation ( miralax & senna) ; A-Fib. Pt resides in a NH. Unable to answer questions, Alert to self / disoriented. No weights to trend since admission. Pt without teeth.

## 2022-10-20 NOTE — DIETITIAN INITIAL EVALUATION ADULT - PERTINENT LABORATORY DATA
10-19    140  |  109<H>  |  9   ----------------------------<  106<H>  3.6   |  25  |  0.64    Ca    8.1<L>      19 Oct 2022 10:16    TPro  6.4  /  Alb  2.1<L>  /  TBili  1.2  /  DBili  x   /  AST  21  /  ALT  25  /  AlkPhos  580<H>  10-19  A1C with Estimated Average Glucose Result: 6.5 % (10-14-22 @ 10:05)

## 2022-10-20 NOTE — PROGRESS NOTE ADULT - ASSESSMENT
89yo Male w PMHx of afib on eliquis, PVD, Dementia, HTN presents with abdominal pain in RUQ and epigastric region.       Choledocolithiasis:   CT abdomen reveals choledocolithiasis with bile duct dilatation. Unable to do MRCP,  US + stones and sludge  belly benign  Continue PO diet now    #Hypernatremia:   Improved after D5W, off IVF now    #Constipation:  Miralax and senna    #A-fib:  Continue home eliquis and carvedilol. Rate controlled    Code: Full  VTE: Eliquis    Discharge to new LTC facility per family request.

## 2022-10-20 NOTE — DISCHARGE NOTE PROVIDER - ATTENDING DISCHARGE PHYSICAL EXAMINATION:
Objective:    Vitals:  T(C): 36.2 (10-20-22 @ 11:08), Max: 37.2 (10-20-22 @ 04:15)  HR: 66 (10-20-22 @ 11:08) (66 - 95)  BP: 133/72 (10-20-22 @ 11:08) (133/72 - 160/67)  RR: 19 (10-20-22 @ 11:08) (17 - 19)  SpO2: 96% (10-20-22 @ 11:08) (96% - 99%)    Physical Exam:  General: comfortable, no acute distress, well nourished  HEENT: Atraumatic, no LAD, trachea midline, PERRLA  Cardiovascular: normal s1s2, no murmurs, gallops or fricition rubs  Pulmonary: clear to ausculation Bilaterally, no wheezing , rhonchi  Gastrointestinal: soft non tender non distended, no masses felt, no organomegally  Muscloskeletal: no lower extremity edema, intact bilateral lower extremity pulses  Neurological: CN II-12 intact. No focal weakness alert oriented x 2  Psychiatrical: normal mood, cooperative  SKIN: no rash, lesions or ulcers   Physical Exam:  General: comfortable, no acute distress, well nourished  HEENT: Atraumatic, no LAD, trachea midline, PERRLA  Cardiovascular: normal s1s2, no murmurs, gallops or fricition rubs  Pulmonary: clear to ausculation Bilaterally, no wheezing , rhonchi  Gastrointestinal: soft non tender non distended, no masses felt, no organomegally  Muscloskeletal: no lower extremity edema, intact bilateral lower extremity pulses  Neurological: CN II-12 intact. No focal weakness alert oriented x 2  Psychiatrical: normal mood, cooperative  SKIN: no rash, lesions or ulcers General: comfortable, no acute distress, elderly  HEENT: Atraumatic, no LAD, trachea midline, PERRLA  Cardiovascular: normal s1s2, no murmurs, gallops or fricition rubs  Pulmonary: clear to ausculation Bilaterally, no wheezing , rhonchi  Gastrointestinal: soft non tender non distended, no masses felt, no organomegally  Muscloskeletal: no lower extremity edema, intact bilateral lower extremity pulses  Neurological: CN II-12 intact. No focal weakness  Psychiatrical: normal mood, cooperative  SKIN: no rash, lesions or ulcers

## 2022-10-21 PROCEDURE — 99239 HOSP IP/OBS DSCHRG MGMT >30: CPT

## 2022-10-21 RX ADMIN — CARVEDILOL PHOSPHATE 3.12 MILLIGRAM(S): 80 CAPSULE, EXTENDED RELEASE ORAL at 17:05

## 2022-10-21 RX ADMIN — APIXABAN 5 MILLIGRAM(S): 2.5 TABLET, FILM COATED ORAL at 05:38

## 2022-10-21 RX ADMIN — CARVEDILOL PHOSPHATE 3.12 MILLIGRAM(S): 80 CAPSULE, EXTENDED RELEASE ORAL at 05:38

## 2022-10-21 RX ADMIN — APIXABAN 5 MILLIGRAM(S): 2.5 TABLET, FILM COATED ORAL at 17:05

## 2022-10-21 RX ADMIN — MEMANTINE HYDROCHLORIDE 5 MILLIGRAM(S): 10 TABLET ORAL at 05:39

## 2022-10-21 RX ADMIN — TAMSULOSIN HYDROCHLORIDE 0.4 MILLIGRAM(S): 0.4 CAPSULE ORAL at 22:22

## 2022-10-21 RX ADMIN — POLYETHYLENE GLYCOL 3350 17 GRAM(S): 17 POWDER, FOR SOLUTION ORAL at 11:24

## 2022-10-21 RX ADMIN — SENNA PLUS 2 TABLET(S): 8.6 TABLET ORAL at 22:23

## 2022-10-21 RX ADMIN — ESCITALOPRAM OXALATE 10 MILLIGRAM(S): 10 TABLET, FILM COATED ORAL at 11:24

## 2022-10-21 RX ADMIN — MEMANTINE HYDROCHLORIDE 5 MILLIGRAM(S): 10 TABLET ORAL at 17:05

## 2022-10-21 NOTE — PROGRESS NOTE ADULT - REASON FOR ADMISSION
Choledocolithiasis

## 2022-10-21 NOTE — PROGRESS NOTE ADULT - PROVIDER SPECIALTY LIST ADULT
Hospitalist
Internal Medicine
Hospitalist
Hospitalist
Internal Medicine
Hospitalist
Hospitalist
Internal Medicine

## 2022-10-21 NOTE — PROGRESS NOTE ADULT - SUBJECTIVE AND OBJECTIVE BOX
Patient seen and examined  feels well  denies any fever chills chest pain shortness of breath  belly benign. LFTS trending down well  US stones + sludge    Review of Systems:  General:denies fever chills, headache, weakness  HEENT: denies blurry vision,diffculty swallowing, difficulty hearing, tinnitus  Cardiovascular: denies chest pain  ,palpitations  Pulmonary:denies shortness of breath, cough, wheezing, hemoptysis  Gastrointestinal: denies abdominal pain, constipation, diarrhea,nausea , vomiting, hematochezia  : denies hematuria, dysuria, or incontinence  Neurological: denies weakness, numbness , tingling, dizziness, tremors  MSK: denies muscle pain, difficulty ambulating, swelling, back pain  skin: denies skin rash, itching, burning, or  skin lesions  Psychiatrical: denies mood disturbances, anxierty, feeling depressed, depression , or difficulty sleeping    Objective:  Vitals  T(C): 37.2 (10-20-22 @ 04:15), Max: 37.2 (10-20-22 @ 04:15)  HR: 95 (10-20-22 @ 04:15) (68 - 95)  BP: 142/81 (10-20-22 @ 04:15) (142/81 - 160/67)  RR: 18 (10-20-22 @ 04:15) (17 - 18)  SpO2: 96% (10-20-22 @ 04:15) (96% - 99%)    Physical Exam:  General: comfortable, no acute distress, elderly  HEENT: Atraumatic, no LAD, trachea midline, PERRLA  Cardiovascular: normal s1s2, no murmurs, gallops or fricition rubs  Pulmonary: clear to ausculation Bilaterally, no wheezing , rhonchi  Gastrointestinal: soft non tender non distended, no masses felt, no organomegally  Muscloskeletal: no lower extremity edema, intact bilateral lower extremity pulses  Neurological: CN II-12 intact. No focal weakness  Psychiatrical: normal mood, cooperative  SKIN: no rash, lesions or ulcers    Labs:                          12.0   5.57  )-----------( 336      ( 19 Oct 2022 10:16 )             37.0     10-19    140  |  109<H>  |  9   ----------------------------<  106<H>  3.6   |  25  |  0.64    Ca    8.1<L>      19 Oct 2022 10:16    TPro  6.4  /  Alb  2.1<L>  /  TBili  1.2  /  DBili  x   /  AST  21  /  ALT  25  /  AlkPhos  580<H>  10-19    LIVER FUNCTIONS - ( 19 Oct 2022 10:16 )  Alb: 2.1 g/dL / Pro: 6.4 gm/dL / ALK PHOS: 580 U/L / ALT: 25 U/L / AST: 21 U/L / GGT: x                 Active Medications  MEDICATIONS  (STANDING):  apixaban 5 milliGRAM(s) Oral every 12 hours  carvedilol 3.125 milliGRAM(s) Oral every 12 hours  escitalopram 10 milliGRAM(s) Oral daily  memantine 5 milliGRAM(s) Oral two times a day  polyethylene glycol 3350 17 Gram(s) Oral daily  senna 2 Tablet(s) Oral at bedtime  tamsulosin 0.4 milliGRAM(s) Oral at bedtime    MEDICATIONS  (PRN):  ALBUTerol    90 MICROgram(s) HFA Inhaler 1 Puff(s) Inhalation every 6 hours PRN Shortness of Breath and/or Wheezing  guaiFENesin Oral Liquid (Sugar-Free) 100 milliGRAM(s) Oral every 6 hours PRN Cough  melatonin 3 milliGRAM(s) Oral at bedtime PRN Insomnia

## 2022-10-22 VITALS
TEMPERATURE: 98 F | SYSTOLIC BLOOD PRESSURE: 120 MMHG | RESPIRATION RATE: 18 BRPM | HEART RATE: 66 BPM | OXYGEN SATURATION: 94 % | DIASTOLIC BLOOD PRESSURE: 75 MMHG

## 2022-10-22 PROCEDURE — 99239 HOSP IP/OBS DSCHRG MGMT >30: CPT

## 2022-10-22 RX ORDER — CARVEDILOL PHOSPHATE 80 MG/1
1 CAPSULE, EXTENDED RELEASE ORAL
Qty: 60 | Refills: 0
Start: 2022-10-22 | End: 2022-11-20

## 2022-10-22 RX ORDER — APIXABAN 2.5 MG/1
1 TABLET, FILM COATED ORAL
Qty: 0 | Refills: 0 | DISCHARGE

## 2022-10-22 RX ORDER — RISPERIDONE 4 MG/1
1 TABLET ORAL
Qty: 0 | Refills: 0 | DISCHARGE

## 2022-10-22 RX ORDER — RISPERIDONE 4 MG/1
1 TABLET ORAL
Qty: 30 | Refills: 0
Start: 2022-10-22 | End: 2022-11-20

## 2022-10-22 RX ORDER — APIXABAN 2.5 MG/1
1 TABLET, FILM COATED ORAL
Qty: 60 | Refills: 0
Start: 2022-10-22 | End: 2022-11-20

## 2022-10-22 RX ORDER — TAMSULOSIN HYDROCHLORIDE 0.4 MG/1
1 CAPSULE ORAL
Qty: 30 | Refills: 0
Start: 2022-10-22 | End: 2022-11-20

## 2022-10-22 RX ORDER — CARVEDILOL PHOSPHATE 80 MG/1
1 CAPSULE, EXTENDED RELEASE ORAL
Qty: 0 | Refills: 0 | DISCHARGE

## 2022-10-22 RX ADMIN — ESCITALOPRAM OXALATE 10 MILLIGRAM(S): 10 TABLET, FILM COATED ORAL at 11:05

## 2022-10-22 RX ADMIN — CARVEDILOL PHOSPHATE 3.12 MILLIGRAM(S): 80 CAPSULE, EXTENDED RELEASE ORAL at 06:15

## 2022-10-22 RX ADMIN — APIXABAN 5 MILLIGRAM(S): 2.5 TABLET, FILM COATED ORAL at 06:15

## 2022-10-22 RX ADMIN — ALBUTEROL 1 PUFF(S): 90 AEROSOL, METERED ORAL at 11:05

## 2022-10-22 RX ADMIN — MEMANTINE HYDROCHLORIDE 5 MILLIGRAM(S): 10 TABLET ORAL at 06:15

## 2022-10-28 DIAGNOSIS — F32.A DEPRESSION, UNSPECIFIED: ICD-10-CM

## 2022-10-28 DIAGNOSIS — I48.91 UNSPECIFIED ATRIAL FIBRILLATION: ICD-10-CM

## 2022-10-28 DIAGNOSIS — I71.40 ABDOMINAL AORTIC ANEURYSM, WITHOUT RUPTURE, UNSPECIFIED: ICD-10-CM

## 2022-10-28 DIAGNOSIS — K80.51 CALCULUS OF BILE DUCT WITHOUT CHOLANGITIS OR CHOLECYSTITIS WITH OBSTRUCTION: ICD-10-CM

## 2022-10-28 DIAGNOSIS — I50.9 HEART FAILURE, UNSPECIFIED: ICD-10-CM

## 2022-10-28 DIAGNOSIS — I73.9 PERIPHERAL VASCULAR DISEASE, UNSPECIFIED: ICD-10-CM

## 2022-10-28 DIAGNOSIS — M19.90 UNSPECIFIED OSTEOARTHRITIS, UNSPECIFIED SITE: ICD-10-CM

## 2022-10-28 DIAGNOSIS — I11.0 HYPERTENSIVE HEART DISEASE WITH HEART FAILURE: ICD-10-CM

## 2022-10-28 DIAGNOSIS — Z79.01 LONG TERM (CURRENT) USE OF ANTICOAGULANTS: ICD-10-CM

## 2022-10-28 DIAGNOSIS — R11.0 NAUSEA: ICD-10-CM

## 2022-10-28 DIAGNOSIS — R10.9 UNSPECIFIED ABDOMINAL PAIN: ICD-10-CM

## 2022-10-28 DIAGNOSIS — K59.00 CONSTIPATION, UNSPECIFIED: ICD-10-CM

## 2022-10-28 DIAGNOSIS — N40.0 BENIGN PROSTATIC HYPERPLASIA WITHOUT LOWER URINARY TRACT SYMPTOMS: ICD-10-CM

## 2022-10-28 DIAGNOSIS — Z86.718 PERSONAL HISTORY OF OTHER VENOUS THROMBOSIS AND EMBOLISM: ICD-10-CM

## 2022-10-28 DIAGNOSIS — E87.0 HYPEROSMOLALITY AND HYPERNATREMIA: ICD-10-CM

## 2022-10-28 DIAGNOSIS — F03.90 UNSPECIFIED DEMENTIA WITHOUT BEHAVIORAL DISTURBANCE: ICD-10-CM

## 2022-10-28 DIAGNOSIS — E78.5 HYPERLIPIDEMIA, UNSPECIFIED: ICD-10-CM

## 2022-12-07 ENCOUNTER — INPATIENT (INPATIENT)
Facility: HOSPITAL | Age: 87
LOS: 12 days | Discharge: HOME | End: 2022-12-20
Attending: STUDENT IN AN ORGANIZED HEALTH CARE EDUCATION/TRAINING PROGRAM | Admitting: STUDENT IN AN ORGANIZED HEALTH CARE EDUCATION/TRAINING PROGRAM

## 2022-12-07 VITALS
RESPIRATION RATE: 18 BRPM | DIASTOLIC BLOOD PRESSURE: 86 MMHG | HEART RATE: 89 BPM | TEMPERATURE: 98 F | SYSTOLIC BLOOD PRESSURE: 118 MMHG | OXYGEN SATURATION: 98 % | HEIGHT: 78 IN

## 2022-12-07 DIAGNOSIS — Z29.9 ENCOUNTER FOR PROPHYLACTIC MEASURES, UNSPECIFIED: ICD-10-CM

## 2022-12-07 DIAGNOSIS — I48.20 CHRONIC ATRIAL FIBRILLATION, UNSPECIFIED: ICD-10-CM

## 2022-12-07 DIAGNOSIS — R31.9 HEMATURIA, UNSPECIFIED: ICD-10-CM

## 2022-12-07 DIAGNOSIS — E87.0 HYPEROSMOLALITY AND HYPERNATREMIA: ICD-10-CM

## 2022-12-07 DIAGNOSIS — R64 CACHEXIA: ICD-10-CM

## 2022-12-07 DIAGNOSIS — F03.90 UNSPECIFIED DEMENTIA WITHOUT BEHAVIORAL DISTURBANCE: ICD-10-CM

## 2022-12-07 DIAGNOSIS — T14.8XXA OTHER INJURY OF UNSPECIFIED BODY REGION, INITIAL ENCOUNTER: ICD-10-CM

## 2022-12-07 DIAGNOSIS — R62.7 ADULT FAILURE TO THRIVE: ICD-10-CM

## 2022-12-07 DIAGNOSIS — Z98.89 OTHER SPECIFIED POSTPROCEDURAL STATES: Chronic | ICD-10-CM

## 2022-12-07 DIAGNOSIS — K80.50 CALCULUS OF BILE DUCT WITHOUT CHOLANGITIS OR CHOLECYSTITIS WITHOUT OBSTRUCTION: ICD-10-CM

## 2022-12-07 PROBLEM — I48.91 UNSPECIFIED ATRIAL FIBRILLATION: Chronic | Status: ACTIVE | Noted: 2022-10-13

## 2022-12-07 LAB
ALBUMIN SERPL ELPH-MCNC: 3.2 G/DL — LOW (ref 3.3–5)
ALP SERPL-CCNC: 203 U/L — HIGH (ref 40–120)
ALT FLD-CCNC: 13 U/L — SIGNIFICANT CHANGE UP (ref 4–41)
ANION GAP SERPL CALC-SCNC: 11 MMOL/L — SIGNIFICANT CHANGE UP (ref 7–14)
ANION GAP SERPL CALC-SCNC: 12 MMOL/L — SIGNIFICANT CHANGE UP (ref 7–14)
APPEARANCE UR: CLEAR — SIGNIFICANT CHANGE UP
APTT BLD: 34 SEC — SIGNIFICANT CHANGE UP (ref 27–36.3)
AST SERPL-CCNC: 14 U/L — SIGNIFICANT CHANGE UP (ref 4–40)
B PERT DNA SPEC QL NAA+PROBE: SIGNIFICANT CHANGE UP
B PERT+PARAPERT DNA PNL SPEC NAA+PROBE: SIGNIFICANT CHANGE UP
BACTERIA # UR AUTO: ABNORMAL
BASE EXCESS BLDV CALC-SCNC: 5.4 MMOL/L — HIGH (ref -2–3)
BASOPHILS # BLD AUTO: 0.03 K/UL — SIGNIFICANT CHANGE UP (ref 0–0.2)
BASOPHILS NFR BLD AUTO: 0.4 % — SIGNIFICANT CHANGE UP (ref 0–2)
BILIRUB SERPL-MCNC: 1.8 MG/DL — HIGH (ref 0.2–1.2)
BILIRUB UR-MCNC: ABNORMAL
BLOOD GAS VENOUS COMPREHENSIVE RESULT: SIGNIFICANT CHANGE UP
BORDETELLA PARAPERTUSSIS (RAPRVP): SIGNIFICANT CHANGE UP
BUN SERPL-MCNC: 20 MG/DL — SIGNIFICANT CHANGE UP (ref 7–23)
BUN SERPL-MCNC: 20 MG/DL — SIGNIFICANT CHANGE UP (ref 7–23)
C PNEUM DNA SPEC QL NAA+PROBE: SIGNIFICANT CHANGE UP
CALCIUM SERPL-MCNC: 9.2 MG/DL — SIGNIFICANT CHANGE UP (ref 8.4–10.5)
CALCIUM SERPL-MCNC: 9.6 MG/DL — SIGNIFICANT CHANGE UP (ref 8.4–10.5)
CHLORIDE BLDV-SCNC: 119 MMOL/L — HIGH (ref 96–108)
CHLORIDE SERPL-SCNC: 118 MMOL/L — HIGH (ref 98–107)
CHLORIDE SERPL-SCNC: 118 MMOL/L — HIGH (ref 98–107)
CO2 BLDV-SCNC: 33.2 MMOL/L — HIGH (ref 22–26)
CO2 SERPL-SCNC: 24 MMOL/L — SIGNIFICANT CHANGE UP (ref 22–31)
CO2 SERPL-SCNC: 27 MMOL/L — SIGNIFICANT CHANGE UP (ref 22–31)
COLOR SPEC: ABNORMAL
CREAT SERPL-MCNC: 0.85 MG/DL — SIGNIFICANT CHANGE UP (ref 0.5–1.3)
CREAT SERPL-MCNC: 0.98 MG/DL — SIGNIFICANT CHANGE UP (ref 0.5–1.3)
DIFF PNL FLD: ABNORMAL
EGFR: 68 ML/MIN/1.73M2 — SIGNIFICANT CHANGE UP
EGFR: 77 ML/MIN/1.73M2 — SIGNIFICANT CHANGE UP
EOSINOPHIL # BLD AUTO: 0 K/UL — SIGNIFICANT CHANGE UP (ref 0–0.5)
EOSINOPHIL NFR BLD AUTO: 0 % — SIGNIFICANT CHANGE UP (ref 0–6)
EPI CELLS # UR: 1 /HPF — SIGNIFICANT CHANGE UP (ref 0–5)
FLUAV SUBTYP SPEC NAA+PROBE: SIGNIFICANT CHANGE UP
FLUBV RNA SPEC QL NAA+PROBE: SIGNIFICANT CHANGE UP
GAS PNL BLDV: 151 MMOL/L — HIGH (ref 136–145)
GLUCOSE BLDV-MCNC: 131 MG/DL — HIGH (ref 70–99)
GLUCOSE SERPL-MCNC: 120 MG/DL — HIGH (ref 70–99)
GLUCOSE SERPL-MCNC: 125 MG/DL — HIGH (ref 70–99)
GLUCOSE UR QL: NEGATIVE — SIGNIFICANT CHANGE UP
HADV DNA SPEC QL NAA+PROBE: SIGNIFICANT CHANGE UP
HCO3 BLDV-SCNC: 32 MMOL/L — HIGH (ref 22–29)
HCOV 229E RNA SPEC QL NAA+PROBE: SIGNIFICANT CHANGE UP
HCOV HKU1 RNA SPEC QL NAA+PROBE: SIGNIFICANT CHANGE UP
HCOV NL63 RNA SPEC QL NAA+PROBE: SIGNIFICANT CHANGE UP
HCOV OC43 RNA SPEC QL NAA+PROBE: SIGNIFICANT CHANGE UP
HCT VFR BLD CALC: 51.1 % — HIGH (ref 39–50)
HCT VFR BLDA CALC: 43 % — SIGNIFICANT CHANGE UP (ref 39–51)
HGB BLD CALC-MCNC: 16.7 G/DL — SIGNIFICANT CHANGE UP (ref 13–17)
HGB BLD-MCNC: 15.9 G/DL — SIGNIFICANT CHANGE UP (ref 13–17)
HMPV RNA SPEC QL NAA+PROBE: SIGNIFICANT CHANGE UP
HPIV1 RNA SPEC QL NAA+PROBE: SIGNIFICANT CHANGE UP
HPIV2 RNA SPEC QL NAA+PROBE: SIGNIFICANT CHANGE UP
HPIV3 RNA SPEC QL NAA+PROBE: SIGNIFICANT CHANGE UP
HPIV4 RNA SPEC QL NAA+PROBE: SIGNIFICANT CHANGE UP
HYALINE CASTS # UR AUTO: 7 /LPF — SIGNIFICANT CHANGE UP (ref 0–7)
IANC: 3.83 K/UL — SIGNIFICANT CHANGE UP (ref 1.8–7.4)
IMM GRANULOCYTES NFR BLD AUTO: 0.9 % — SIGNIFICANT CHANGE UP (ref 0–0.9)
INR BLD: 1.52 RATIO — HIGH (ref 0.88–1.16)
KETONES UR-MCNC: NEGATIVE — SIGNIFICANT CHANGE UP
LACTATE BLDV-MCNC: 2.7 MMOL/L — HIGH (ref 0.5–2)
LEUKOCYTE ESTERASE UR-ACNC: NEGATIVE — SIGNIFICANT CHANGE UP
LYMPHOCYTES # BLD AUTO: 2.66 K/UL — SIGNIFICANT CHANGE UP (ref 1–3.3)
LYMPHOCYTES # BLD AUTO: 38 % — SIGNIFICANT CHANGE UP (ref 13–44)
M PNEUMO DNA SPEC QL NAA+PROBE: SIGNIFICANT CHANGE UP
MAGNESIUM SERPL-MCNC: 2.1 MG/DL — SIGNIFICANT CHANGE UP (ref 1.6–2.6)
MCHC RBC-ENTMCNC: 26.9 PG — LOW (ref 27–34)
MCHC RBC-ENTMCNC: 31.1 GM/DL — LOW (ref 32–36)
MCV RBC AUTO: 86.6 FL — SIGNIFICANT CHANGE UP (ref 80–100)
MONOCYTES # BLD AUTO: 0.42 K/UL — SIGNIFICANT CHANGE UP (ref 0–0.9)
MONOCYTES NFR BLD AUTO: 6 % — SIGNIFICANT CHANGE UP (ref 2–14)
NEUTROPHILS # BLD AUTO: 3.83 K/UL — SIGNIFICANT CHANGE UP (ref 1.8–7.4)
NEUTROPHILS NFR BLD AUTO: 54.7 % — SIGNIFICANT CHANGE UP (ref 43–77)
NITRITE UR-MCNC: NEGATIVE — SIGNIFICANT CHANGE UP
NRBC # BLD: 0 /100 WBCS — SIGNIFICANT CHANGE UP (ref 0–0)
NRBC # FLD: 0.02 K/UL — HIGH (ref 0–0)
PCO2 BLDV: 51 MMHG — SIGNIFICANT CHANGE UP (ref 42–55)
PH BLDV: 7.4 — SIGNIFICANT CHANGE UP (ref 7.32–7.43)
PH UR: 5.5 — SIGNIFICANT CHANGE UP (ref 5–8)
PHOSPHATE SERPL-MCNC: 2.7 MG/DL — SIGNIFICANT CHANGE UP (ref 2.5–4.5)
PLATELET # BLD AUTO: 305 K/UL — SIGNIFICANT CHANGE UP (ref 150–400)
PO2 BLDV: <20 MMHG — SIGNIFICANT CHANGE UP
POTASSIUM BLDV-SCNC: 3.8 MMOL/L — SIGNIFICANT CHANGE UP (ref 3.5–5.1)
POTASSIUM SERPL-MCNC: 3.8 MMOL/L — SIGNIFICANT CHANGE UP (ref 3.5–5.3)
POTASSIUM SERPL-MCNC: 4 MMOL/L — SIGNIFICANT CHANGE UP (ref 3.5–5.3)
POTASSIUM SERPL-SCNC: 3.8 MMOL/L — SIGNIFICANT CHANGE UP (ref 3.5–5.3)
POTASSIUM SERPL-SCNC: 4 MMOL/L — SIGNIFICANT CHANGE UP (ref 3.5–5.3)
PROT SERPL-MCNC: 7.5 G/DL — SIGNIFICANT CHANGE UP (ref 6–8.3)
PROT UR-MCNC: ABNORMAL
PROTHROM AB SERPL-ACNC: 17.7 SEC — HIGH (ref 10.5–13.4)
RAPID RVP RESULT: SIGNIFICANT CHANGE UP
RBC # BLD: 5.9 M/UL — HIGH (ref 4.2–5.8)
RBC # FLD: 19.2 % — HIGH (ref 10.3–14.5)
RBC CASTS # UR COMP ASSIST: 134 /HPF — HIGH (ref 0–4)
RSV RNA SPEC QL NAA+PROBE: SIGNIFICANT CHANGE UP
RV+EV RNA SPEC QL NAA+PROBE: SIGNIFICANT CHANGE UP
SAO2 % BLDV: 12.7 % — SIGNIFICANT CHANGE UP
SARS-COV-2 RNA SPEC QL NAA+PROBE: SIGNIFICANT CHANGE UP
SODIUM SERPL-SCNC: 154 MMOL/L — HIGH (ref 135–145)
SODIUM SERPL-SCNC: 156 MMOL/L — HIGH (ref 135–145)
SP GR SPEC: 1.03 — SIGNIFICANT CHANGE UP (ref 1.01–1.05)
UROBILINOGEN FLD QL: ABNORMAL
WBC # BLD: 7 K/UL — SIGNIFICANT CHANGE UP (ref 3.8–10.5)
WBC # FLD AUTO: 7 K/UL — SIGNIFICANT CHANGE UP (ref 3.8–10.5)
WBC UR QL: 3 /HPF — SIGNIFICANT CHANGE UP (ref 0–5)

## 2022-12-07 PROCEDURE — 71045 X-RAY EXAM CHEST 1 VIEW: CPT | Mod: 26

## 2022-12-07 PROCEDURE — 99497 ADVNCD CARE PLAN 30 MIN: CPT | Mod: 25

## 2022-12-07 PROCEDURE — 99285 EMERGENCY DEPT VISIT HI MDM: CPT | Mod: FS

## 2022-12-07 PROCEDURE — 99223 1ST HOSP IP/OBS HIGH 75: CPT

## 2022-12-07 RX ORDER — CHOLECALCIFEROL (VITAMIN D3) 125 MCG
1000 CAPSULE ORAL DAILY
Refills: 0 | Status: DISCONTINUED | OUTPATIENT
Start: 2022-12-07 | End: 2022-12-20

## 2022-12-07 RX ORDER — ACETAMINOPHEN 500 MG
650 TABLET ORAL EVERY 6 HOURS
Refills: 0 | Status: DISCONTINUED | OUTPATIENT
Start: 2022-12-07 | End: 2022-12-20

## 2022-12-07 RX ORDER — MEGESTROL ACETATE 40 MG/ML
10 SUSPENSION ORAL
Qty: 0 | Refills: 0 | DISCHARGE

## 2022-12-07 RX ORDER — CHOLECALCIFEROL (VITAMIN D3) 125 MCG
1 CAPSULE ORAL
Qty: 0 | Refills: 0 | DISCHARGE

## 2022-12-07 RX ORDER — ASCORBIC ACID 60 MG
1 TABLET,CHEWABLE ORAL
Qty: 0 | Refills: 0 | DISCHARGE

## 2022-12-07 RX ORDER — SODIUM CHLORIDE 9 MG/ML
1000 INJECTION, SOLUTION INTRAVENOUS
Refills: 0 | Status: DISCONTINUED | OUTPATIENT
Start: 2022-12-07 | End: 2022-12-08

## 2022-12-07 RX ORDER — LANOLIN ALCOHOL/MO/W.PET/CERES
1 CREAM (GRAM) TOPICAL
Qty: 0 | Refills: 0 | DISCHARGE

## 2022-12-07 RX ORDER — RISPERIDONE 4 MG/1
0.5 TABLET ORAL AT BEDTIME
Refills: 0 | Status: DISCONTINUED | OUTPATIENT
Start: 2022-12-07 | End: 2022-12-20

## 2022-12-07 RX ORDER — SODIUM CHLORIDE 9 MG/ML
1000 INJECTION INTRAMUSCULAR; INTRAVENOUS; SUBCUTANEOUS ONCE
Refills: 0 | Status: COMPLETED | OUTPATIENT
Start: 2022-12-07 | End: 2022-12-07

## 2022-12-07 RX ORDER — ONDANSETRON 8 MG/1
4 TABLET, FILM COATED ORAL EVERY 8 HOURS
Refills: 0 | Status: DISCONTINUED | OUTPATIENT
Start: 2022-12-07 | End: 2022-12-20

## 2022-12-07 RX ORDER — APIXABAN 2.5 MG/1
5 TABLET, FILM COATED ORAL
Refills: 0 | Status: DISCONTINUED | OUTPATIENT
Start: 2022-12-07 | End: 2022-12-20

## 2022-12-07 RX ORDER — ASCORBIC ACID 60 MG
500 TABLET,CHEWABLE ORAL DAILY
Refills: 0 | Status: DISCONTINUED | OUTPATIENT
Start: 2022-12-07 | End: 2022-12-20

## 2022-12-07 RX ORDER — LACTULOSE 10 G/15ML
30 SOLUTION ORAL
Qty: 0 | Refills: 0 | DISCHARGE

## 2022-12-07 RX ORDER — LANOLIN ALCOHOL/MO/W.PET/CERES
6 CREAM (GRAM) TOPICAL AT BEDTIME
Refills: 0 | Status: DISCONTINUED | OUTPATIENT
Start: 2022-12-07 | End: 2022-12-20

## 2022-12-07 RX ORDER — TAMSULOSIN HYDROCHLORIDE 0.4 MG/1
0.4 CAPSULE ORAL AT BEDTIME
Refills: 0 | Status: DISCONTINUED | OUTPATIENT
Start: 2022-12-07 | End: 2022-12-20

## 2022-12-07 RX ORDER — SENNA PLUS 8.6 MG/1
2 TABLET ORAL AT BEDTIME
Refills: 0 | Status: DISCONTINUED | OUTPATIENT
Start: 2022-12-07 | End: 2022-12-20

## 2022-12-07 RX ORDER — FUROSEMIDE 40 MG
1 TABLET ORAL
Qty: 0 | Refills: 0 | DISCHARGE

## 2022-12-07 RX ORDER — SENNA PLUS 8.6 MG/1
2 TABLET ORAL
Qty: 0 | Refills: 0 | DISCHARGE

## 2022-12-07 RX ORDER — CARVEDILOL PHOSPHATE 80 MG/1
3.12 CAPSULE, EXTENDED RELEASE ORAL EVERY 12 HOURS
Refills: 0 | Status: DISCONTINUED | OUTPATIENT
Start: 2022-12-07 | End: 2022-12-20

## 2022-12-07 RX ORDER — SODIUM CHLORIDE 9 MG/ML
1000 INJECTION, SOLUTION INTRAVENOUS ONCE
Refills: 0 | Status: COMPLETED | OUTPATIENT
Start: 2022-12-07 | End: 2022-12-07

## 2022-12-07 RX ADMIN — Medication 6 MILLIGRAM(S): at 21:56

## 2022-12-07 RX ADMIN — CARVEDILOL PHOSPHATE 3.12 MILLIGRAM(S): 80 CAPSULE, EXTENDED RELEASE ORAL at 21:56

## 2022-12-07 RX ADMIN — RISPERIDONE 0.5 MILLIGRAM(S): 4 TABLET ORAL at 21:56

## 2022-12-07 RX ADMIN — TAMSULOSIN HYDROCHLORIDE 0.4 MILLIGRAM(S): 0.4 CAPSULE ORAL at 21:56

## 2022-12-07 RX ADMIN — APIXABAN 5 MILLIGRAM(S): 2.5 TABLET, FILM COATED ORAL at 21:56

## 2022-12-07 RX ADMIN — SENNA PLUS 2 TABLET(S): 8.6 TABLET ORAL at 21:56

## 2022-12-07 RX ADMIN — SODIUM CHLORIDE 1000 MILLILITER(S): 9 INJECTION, SOLUTION INTRAVENOUS at 17:21

## 2022-12-07 RX ADMIN — SODIUM CHLORIDE 1000 MILLILITER(S): 9 INJECTION INTRAMUSCULAR; INTRAVENOUS; SUBCUTANEOUS at 13:53

## 2022-12-07 RX ADMIN — SODIUM CHLORIDE 75 MILLILITER(S): 9 INJECTION, SOLUTION INTRAVENOUS at 23:46

## 2022-12-07 NOTE — ED PROVIDER NOTE - OBJECTIVE STATEMENT
89 y/o male pmh dementia, afib on eliquis, sent in for failure to thrive. Pt unable to provide hx, as per daughter Audrey ([phone]) pt has not been eating or drinking for the past 1-2 weeks. Pt has been difficult to take medications as well. Currently lives at home with family who is sole support for him. Denies fever or chills. 89 y/o male pmh dementia, afib on eliquis, sent in for failure to thrive. Pt unable to provide hx, as per daughter Audrey ([phone]) pt has not been eating or drinking for the past 1-2 weeks. Pt has been difficult to take medications as well. Currently lives at home with family who is sole support for him. Denies fever or chills.  Attending - Agree with above.  I evaluated patient myself. 89-year-old male with past medical history as noted including dementia.  Lives at home cared for by daughter.  Referred to ER as refusing to eat or drink for past 1 to 2 weeks.  Also refusing to take medications.  Denies any fever, cough, recent COVID.

## 2022-12-07 NOTE — H&P ADULT - PROBLEM SELECTOR PLAN 3
Previously noted, but no intervention performed due to lack of symptoms. T Bili slightly higher, however alk phos lower than prior  - F/u CT abdomen/pelvis  - Monitor for symptoms, for now tylenol for pain control but can do very low dose oxy or morphine if needed

## 2022-12-07 NOTE — ED PROVIDER NOTE - CLINICAL SUMMARY MEDICAL DECISION MAKING FREE TEXT BOX
89 y/o male pmh dementia, afib on eliquis sent in for failure to thrive- will check labs, cultures, ua, fluids, admit

## 2022-12-07 NOTE — H&P ADULT - PROBLEM SELECTOR PLAN 1
Lack of PO intake (food or drink) at home x 1 month. DDx includes progression of dementia vs secondary to abdominal pain from choledocholithiasis. Discussed NGT with family, deferring for now and will consider it.  - F/u CT abdomen pelvis  - Nutrition consult  - [place] diet to encourage PO intake

## 2022-12-07 NOTE — ED PROVIDER NOTE - CONSTITUTIONAL MENTATION
awake/alert

## 2022-12-07 NOTE — ED PROVIDER NOTE - PHYSICAL EXAMINATION
appears very dry, tongue and mucous membranes dry. appears very dry, tongue and mucous membranes dry.  ATTENDING PHYSICAL EXAM  GEN - NAD; well appearing; A+O x3  HEAD - NC/AT; EYES/NOSE - PERRL, EOMI, mucous membranes dry, no discharge; THROAT: Oral cavity and pharynx normal. No inflammation, swelling, exudate, or lesions  NECK: Neck supple, non-tender without lymphadenopathy, no masses, no JVD  PULMONARY - CTA b/l, symmetric breath sounds, no w/r/r  CARDIAC -s1s2, RRR, no M,R,G  ABDOMEN - +NABS, ND, NT, soft, no guarding, no rebound, no masses   BACK - no CVA tenderness, No vertebral or paravertebral tenderness  EXTREMITIES - symmetric pulses, 2+ dp, capillary refill < 2 seconds, no clubbing, no cyanosis, no edema   NEUROLOGIC - alert, moving all extremities.  Noncompliant with further testing.

## 2022-12-07 NOTE — H&P ADULT - PROBLEM SELECTOR PLAN 4
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  - As above, discussed with family. Patient is DNR/DNI.  - MOLST to be placed in chart.  - Low dose Risperidone  - Family requesting dispo to nursing home as they are unable to care for him at home

## 2022-12-07 NOTE — ED ADULT NURSE NOTE - OBJECTIVE STATEMENT
Patient is a 89 yo male, h/x dementia, afib, HTN, HLD, DVT, IVC filter, presenting with malaise x 1 week per triage note. In room patient AAOx1, not offering any complaints, states "I feel better now," not answering most questions, denies pain, chest pain, shortness of breath. Appears dehydrated and malnourished. Placed on cardiac monitor, afib in 100s, other VSS. Unable to place PIV, IAM [place] notified. Urine sent per orders. Pending chest x-ray. Fall precautions maintained.

## 2022-12-07 NOTE — PROVIDER CONTACT NOTE (OTHER) - ASSESSMENT
Pt A&Ox1 to self and is confused. Pt denies chest pain SOB or headache. Pt denies nausea or vomiting.

## 2022-12-07 NOTE — H&P ADULT - NSHPLABSRESULTS_GEN_ALL_CORE
15.9   7.00  )-----------( 305      ( 07 Dec 2022 13:58 )             51.1     12    156<H>  |  118<H>  |  20  ----------------------------<  125<H>  4.0   |  27  |  0.98    Ca    9.6      07 Dec 2022 13:58    TPro  7.5  /  Alb  3.2<L>  /  TBili  1.8<H>  /  DBili  x   /  AST  14  /  ALT  13  /  AlkPhos  203<H>  12    Urinalysis Basic - ( 07 Dec 2022 12:31 )    Color: Elena / Appearance: Clear / S.028 / pH: x  Gluc: x / Ketone: Negative  / Bili: Small / Urobili: 6 mg/dL   Blood: x / Protein: 30 mg/dL / Nitrite: Negative   Leuk Esterase: Negative / RBC: 134 /HPF / WBC 3 /HPF   Sq Epi: x / Non Sq Epi: 1 /HPF / Bacteria: Occasional      Blood Gas Profile - Venous (22 @ 13:58)   pH, Venous: 7.40   pCO2, Venous: 51 mmHg   pO2, Venous: <20 mmHg   HCO3, Venous: 32 mmol/L   Base Excess, Venous: 5.4 mmol/L   Oxygen Saturation, Venous: 12.7 %   Total CO2, Venous: 33.2 mmol/L     < from: Xray Chest 1 View- PORTABLE-Urgent (Xray Chest 1 View- PORTABLE-Urgent .) (22 @ 13:22) >    IMPRESSION:    Clear lungs.    --- End of Report ---    < end of copied text >

## 2022-12-07 NOTE — H&P ADULT - NSHPPHYSICALEXAM_GEN_ALL_CORE
Vital Signs Last 24 Hrs  T(C): 36.1 (07 Dec 2022 19:30), Max: 36.7 (07 Dec 2022 10:28)  T(F): 97 (07 Dec 2022 19:30), Max: 98 (07 Dec 2022 10:28)  HR: 92 (07 Dec 2022 19:30) (89 - 107)  BP: 142/80 (07 Dec 2022 19:30) (118/86 - 142/80)  BP(mean): --  RR: 18 (07 Dec 2022 19:30) (18 - 24)  SpO2: 100% (07 Dec 2022 19:30) (98% - 100%)    Parameters below as of 07 Dec 2022 19:30  Patient On (Oxygen Delivery Method): room air        CONSTITUTIONAL: Elderly man, cachectic appearing, in no apparent distress  EYES: No conjunctival or scleral injection, non-icteric; Pupils symmetric, arcus senilis  ENMT: No external nasal lesions; nasal mucosa not inflamed; poor dentition; white plaques on tongue oral mucosa with moist membranes  NECK: Trachea midline; 3 cm firm and mobile mass, nontender; thyroid not enlarged and non-tender  RESPIRATORY: Breathing comfortably; no dullness to percussion; lungs CTA without wheeze/rhonchi/rales  CARDIOVASCULAR: +S1S2, mildly tachycardic and irregular no M/G/R; pedal pulses diminished and symmetric; no lower extremity edema  GASTROINTESTINAL: No palpable masses or tenderness, +BS throughout, no rebound/guarding; no hernia palpated  LYMPHATIC: No cervical LAD or tenderness  MUSCULOSKELETAL:  no digital clubbing or cyanosis; no paraspinal tenderness; diminished strength of all extremities, equal and symmetric  SKIN: Palpable mass on neck as above  NEUROLOGIC: CN II-XII intact; sensation intact in LEs b/l to light touch  PSYCHIATRIC: A+O x 1; mood and affect appropriate; nonsensical speech Vital Signs Last 24 Hrs  T(C): 36.1 (07 Dec 2022 19:30), Max: 36.7 (07 Dec 2022 10:28)  T(F): 97 (07 Dec 2022 19:30), Max: 98 (07 Dec 2022 10:28)  HR: 92 (07 Dec 2022 19:30) (89 - 107)  BP: 142/80 (07 Dec 2022 19:30) (118/86 - 142/80)  BP(mean): --  RR: 18 (07 Dec 2022 19:30) (18 - 24)  SpO2: 100% (07 Dec 2022 19:30) (98% - 100%)    Parameters below as of 07 Dec 2022 19:30  Patient On (Oxygen Delivery Method): room air        CONSTITUTIONAL: Elderly man, cachectic appearing, bitemporal wasting, in no apparent distress  EYES: No conjunctival or scleral injection, non-icteric; Pupils symmetric, arcus senilis  ENMT: No external nasal lesions; nasal mucosa not inflamed; poor dentition; white plaques on tongue oral mucosa with moist membranes  NECK: Trachea midline; 3 cm firm and mobile mass, nontender; thyroid not enlarged and non-tender  RESPIRATORY: Breathing comfortably; no dullness to percussion; lungs CTA without wheeze/rhonchi/rales  CARDIOVASCULAR: +S1S2, mildly tachycardic and irregular no M/G/R; pedal pulses diminished and symmetric; no lower extremity edema  GASTROINTESTINAL: No palpable masses or tenderness, +BS throughout, no rebound/guarding; no hernia palpated  LYMPHATIC: No cervical LAD or tenderness  MUSCULOSKELETAL:  no digital clubbing or cyanosis; no paraspinal tenderness; diminished strength of all extremities, equal and symmetric  SKIN: Palpable mass on neck as above  NEUROLOGIC: CN II-XII intact; sensation intact in LEs b/l to light touch  PSYCHIATRIC: A+O x 1; mood and affect appropriate; nonsensical speech

## 2022-12-07 NOTE — H&P ADULT - PROBLEM SELECTOR PLAN 5
On Eliquis 5mg BID.   - Check weight, may need dose reduction to 2.5 mg BID if weight under 60kg (Age > 80)  - C/w carvedilol 3.125mg BID

## 2022-12-07 NOTE — H&P ADULT - PROBLEM/PLAN-7
Below message received from My Chart Patient Clinical Update:    Problems   The patient or proxy has not reviewed this information.    Medications   The patient or proxy has not reviewed this information, and there are updates pending:   Requested Medicatio
DISPLAY PLAN FREE TEXT

## 2022-12-07 NOTE — H&P ADULT - PROBLEM SELECTOR PLAN 8
DVT: Eliquis  Diet: Full to encourage PO intake/nutrition consult  Dispo: Family requesting nursing home. PT consult  GOC: DNR/DNI

## 2022-12-07 NOTE — H&P ADULT - PROBLEM SELECTOR PLAN 6
RBCs on UA, family endorsing dark urine with large clots.  - Hgb currently stable (elevated i/s/o hemoconcentration), if drops significantly consider holding eliquis but for now continue  - If patient endorsing suprapubic pain, consider uro consult vs trial of belladonna/opium  - Continue with tamsulosin .4mg QHS

## 2022-12-07 NOTE — H&P ADULT - HISTORY OF PRESENT ILLNESS
89 year old man with PMHx afib on Eliquis, PVD d/t prior DVTs, dementia (A&Ox1 at baseline), HTN, kidney stones, choledocholithiasis, bed sore presents to ED due to failure to thrive and abdominal pain. Patient was recently admitted to [facility] for abdominal pain, found to have choledocholithiasis without intervention. He was discharged to a nursing home, where he had been x4 months, but family had since taken him home because they did not feel he was being treated well. At home, he is being taken care of by family, and he has stopped eating and drinking for the past month, complaining of stomach pain, worsened over the past week, not sleeping, and stopped walking/standing due to weakness. He had a fall one month ago for which he was brought to the hospital. Family also endorses large clots with dark urine, no BM x 1.5 weeks.    On my evaluation, patient is not participatory in interview and is not oriented.    In ED, T 98, HRmax 107, /75, RR 24, SpO2 98. Given 2L IVF bolus. Labs notable for hemoconcentration and hypernatremia, as well as mildly elevated T Bili and alk phos (lower than previous), and hematuria

## 2022-12-07 NOTE — H&P ADULT - PROBLEM SELECTOR PLAN 2
Likely d/t hypovolemia. Now s/p 2L IVF since Na 154  - Repeat BMP and start D5 by free water deficit  - Do not correct by more than 6-8mEq in 24h Likely d/t hypovolemia. Now s/p 2L IVF since Na 156  - Repeat BMP and start D5 by free water deficit  - Do not correct by more than 6-8mEq in 24h

## 2022-12-07 NOTE — H&P ADULT - CONVERSATION DETAILS
Discussed the patient's condition with Tete (states she is designated HCP and has previously filled out paperwork to that effect) and Audrey, who are his primary caretakers since coming home from nursing home.     Explained that often times, when a patient with dementia stops eating and drinking, that is considered the end stages of dementia. They understand this. Also explained that sometimes there is a reversible cause for anorexia, such as abdominal pain, as in this patient, however his malnutrition leaves him with little reserve.     When broaching the topic of code status, they both agree that the patient would not want nor benefit from aggressive measures such as resuscitation or intubation, should those be medically indicated. They would want their father to pass peacefully and with dignity. They agree to a DNR/DNI order. Currently, they are undecided about placement of a temporary feeding tube if that were offered, and will continue to consider it.    They state that after this hospitalization, they want their father to go to a nursing home.

## 2022-12-08 LAB
ANION GAP SERPL CALC-SCNC: 12 MMOL/L — SIGNIFICANT CHANGE UP (ref 7–14)
BUN SERPL-MCNC: 22 MG/DL — SIGNIFICANT CHANGE UP (ref 7–23)
CALCIUM SERPL-MCNC: 9 MG/DL — SIGNIFICANT CHANGE UP (ref 8.4–10.5)
CHLORIDE SERPL-SCNC: 115 MMOL/L — HIGH (ref 98–107)
CO2 SERPL-SCNC: 23 MMOL/L — SIGNIFICANT CHANGE UP (ref 22–31)
CREAT SERPL-MCNC: 0.81 MG/DL — SIGNIFICANT CHANGE UP (ref 0.5–1.3)
CULTURE RESULTS: NO GROWTH — SIGNIFICANT CHANGE UP
EGFR: 78 ML/MIN/1.73M2 — SIGNIFICANT CHANGE UP
GLUCOSE SERPL-MCNC: 141 MG/DL — HIGH (ref 70–99)
HCT VFR BLD CALC: 48.7 % — SIGNIFICANT CHANGE UP (ref 39–50)
HGB BLD-MCNC: 15.4 G/DL — SIGNIFICANT CHANGE UP (ref 13–17)
MCHC RBC-ENTMCNC: 27.2 PG — SIGNIFICANT CHANGE UP (ref 27–34)
MCHC RBC-ENTMCNC: 31.6 GM/DL — LOW (ref 32–36)
MCV RBC AUTO: 85.9 FL — SIGNIFICANT CHANGE UP (ref 80–100)
MRSA PCR RESULT.: SIGNIFICANT CHANGE UP
NRBC # BLD: 0 /100 WBCS — SIGNIFICANT CHANGE UP (ref 0–0)
NRBC # FLD: 0.02 K/UL — HIGH (ref 0–0)
PLATELET # BLD AUTO: 300 K/UL — SIGNIFICANT CHANGE UP (ref 150–400)
POTASSIUM SERPL-MCNC: 3.5 MMOL/L — SIGNIFICANT CHANGE UP (ref 3.5–5.3)
POTASSIUM SERPL-SCNC: 3.5 MMOL/L — SIGNIFICANT CHANGE UP (ref 3.5–5.3)
RBC # BLD: 5.67 M/UL — SIGNIFICANT CHANGE UP (ref 4.2–5.8)
RBC # FLD: 19.1 % — HIGH (ref 10.3–14.5)
S AUREUS DNA NOSE QL NAA+PROBE: SIGNIFICANT CHANGE UP
SODIUM SERPL-SCNC: 150 MMOL/L — HIGH (ref 135–145)
SPECIMEN SOURCE: SIGNIFICANT CHANGE UP
WBC # BLD: 6.22 K/UL — SIGNIFICANT CHANGE UP (ref 3.8–10.5)
WBC # FLD AUTO: 6.22 K/UL — SIGNIFICANT CHANGE UP (ref 3.8–10.5)

## 2022-12-08 PROCEDURE — 99222 1ST HOSP IP/OBS MODERATE 55: CPT

## 2022-12-08 PROCEDURE — 99232 SBSQ HOSP IP/OBS MODERATE 35: CPT

## 2022-12-08 RX ORDER — SODIUM CHLORIDE 9 MG/ML
1000 INJECTION, SOLUTION INTRAVENOUS
Refills: 0 | Status: DISCONTINUED | OUTPATIENT
Start: 2022-12-08 | End: 2022-12-10

## 2022-12-08 RX ORDER — CHLORHEXIDINE GLUCONATE 213 G/1000ML
1 SOLUTION TOPICAL DAILY
Refills: 0 | Status: DISCONTINUED | OUTPATIENT
Start: 2022-12-08 | End: 2022-12-20

## 2022-12-08 RX ADMIN — APIXABAN 5 MILLIGRAM(S): 2.5 TABLET, FILM COATED ORAL at 07:40

## 2022-12-08 RX ADMIN — TAMSULOSIN HYDROCHLORIDE 0.4 MILLIGRAM(S): 0.4 CAPSULE ORAL at 21:10

## 2022-12-08 RX ADMIN — RISPERIDONE 0.5 MILLIGRAM(S): 4 TABLET ORAL at 21:10

## 2022-12-08 RX ADMIN — SODIUM CHLORIDE 75 MILLILITER(S): 9 INJECTION, SOLUTION INTRAVENOUS at 12:02

## 2022-12-08 RX ADMIN — APIXABAN 5 MILLIGRAM(S): 2.5 TABLET, FILM COATED ORAL at 18:39

## 2022-12-08 RX ADMIN — Medication 1000 UNIT(S): at 18:39

## 2022-12-08 RX ADMIN — Medication 1 TABLET(S): at 12:07

## 2022-12-08 RX ADMIN — Medication 6 MILLIGRAM(S): at 21:10

## 2022-12-08 RX ADMIN — CARVEDILOL PHOSPHATE 3.12 MILLIGRAM(S): 80 CAPSULE, EXTENDED RELEASE ORAL at 18:39

## 2022-12-08 RX ADMIN — CHLORHEXIDINE GLUCONATE 1 APPLICATION(S): 213 SOLUTION TOPICAL at 12:07

## 2022-12-08 RX ADMIN — SENNA PLUS 2 TABLET(S): 8.6 TABLET ORAL at 21:11

## 2022-12-08 RX ADMIN — CARVEDILOL PHOSPHATE 3.12 MILLIGRAM(S): 80 CAPSULE, EXTENDED RELEASE ORAL at 07:40

## 2022-12-08 RX ADMIN — Medication 500 MILLIGRAM(S): at 12:07

## 2022-12-08 NOTE — CONSULT NOTE ADULT - SUBJECTIVE AND OBJECTIVE BOX
Wound SURGERY CONSULT NOTE    HPI:  89 year old man with PMHx afib on Eliquis, PVD d/t prior DVTs, dementia (A&Ox1 at baseline), HTN, kidney stones, choledocholithiasis, bed sore presents to ED due to failure to thrive and abdominal pain. Patient was recently admitted to [facility] for abdominal pain, found to have choledocholithiasis without intervention. He was discharged to a nursing home, where he had been x4 months, but family had since taken him home because they did not feel he was being treated well. At home, he is being taken care of by family, and he has stopped eating and drinking for the past month, complaining of stomach pain, worsened over the past week, not sleeping, and stopped walking/standing due to weakness. He had a fall one month ago for which he was brought to the hospital. Family also endorses large clots with dark urine, no BM x 1.5 weeks.    On my evaluation, patient is not participatory in interview and is not oriented.    In ED, T 98, HRmax 107, /75, RR 24, SpO2 98. Given 2L IVF bolus. Labs notable for hemoconcentration and hypernatremia, as well as mildly elevated T Bili and alk phos (lower than previous), and hematuria (07 Dec 2022 21:02)    Wound consult requested to assist w/ management of pressure injuries present on arrival. Patient unable to provide subjective data. Patient dementia, oriented to self.    Current Diet: Diet, Regular:   Easy to Chew (EASYTOCHEW) (12-07-22 @ 21:38)      PAST MEDICAL & SURGICAL HISTORY:  DVT (deep venous thrombosis)  bilateral      Hypertension      Depression      Dementia      HLD (hyperlipidemia)      OA (osteoarthritis)      Cholelithiasis      A-fib  On Eliquis      S/P IVC filter          REVIEW OF SYSTEMS: Pt unable to offer, dementia    MEDICATIONS  (STANDING):  apixaban 5 milliGRAM(s) Oral two times a day  ascorbic acid 500 milliGRAM(s) Oral daily  calcium carbonate 1250 mG  + Vitamin D (OsCal 500 + D) 1 Tablet(s) Oral daily  carvedilol 3.125 milliGRAM(s) Oral every 12 hours  chlorhexidine 2% Cloths 1 Application(s) Topical daily  cholecalciferol 1000 Unit(s) Oral daily  dextrose 5%. 1000 milliLiter(s) (75 mL/Hr) IV Continuous <Continuous>  melatonin 6 milliGRAM(s) Oral at bedtime  multivitamin 1 Tablet(s) Oral daily  risperiDONE   Tablet 0.5 milliGRAM(s) Oral at bedtime  senna 2 Tablet(s) Oral at bedtime  tamsulosin 0.4 milliGRAM(s) Oral at bedtime    MEDICATIONS  (PRN):  acetaminophen     Tablet .. 650 milliGRAM(s) Oral every 6 hours PRN Temp greater or equal to 38C (100.4F), Mild Pain (1 - 3)  ondansetron Injectable 4 milliGRAM(s) IV Push every 8 hours PRN Nausea and/or Vomiting      Allergies    No Known Allergies    Intolerances        SOCIAL HISTORY:  Per records previously at nursing home. Now lives home with daughters.   Per records no etoh, smoking, illicit drug use. (+) dementia.  (+) DNR, (+)DNI      FAMILY HISTORY:  FH: dementia        PHYSICAL EXAM:  Vital Signs Last 24 Hrs  T(C): 36.6 (08 Dec 2022 07:35), Max: 36.6 (08 Dec 2022 07:35)  T(F): 97.9 (08 Dec 2022 07:35), Max: 97.9 (08 Dec 2022 07:35)  HR: 80 (08 Dec 2022 07:35) (80 - 135)  BP: 127/70 (08 Dec 2022 07:35) (119/75 - 147/82)  BP(mean): --  RR: 16 (08 Dec 2022 07:35) (15 - 24)  SpO2: 100% (08 Dec 2022 07:35) (97% - 100%)    Parameters below as of 08 Dec 2022 07:35  Patient On (Oxygen Delivery Method): room air    Wt: 64.5 kg (12-8-22)  BMI: 14.5 kg/m2 (12-8-22)    Constitutional: NAD, A&O x 1, PCA at bedside, patient just completing eating most of breakfast with assistance.  Appears in good hygiene, well groomed. Cachetic, frail.  (+) low airloss support surface, (+) fluidized positioning devices, (+) complete cair boots  HEENT:  NC/AT, PERRL, EOMI, mucosa moist  Cardiovascular: rate regular  Respiratory: room air, nonlabored, equal chest expansion  Gastrointestinal: soft NT/ND  : urinary incontinence, perineal care provided. Single breathable incontinence pad replaced.  Neurology: strength & sensation grossly intact, follows commands  Musculoskeletal:  no deformities/ contractures  Vascular: BLE hemosiderin staining, trace edema bilaterally, sensation intact.  +1 dp pulses, capillary refill > 3 seconds.   Left distal great toe- mixed etiology deep tissue pressure injury versus arterial insufficiency- 0.5cmx0.5cmx0, no palpable skin changes, non tender.  Skin:  moist w/ good turgor  Left sacrum- unstageable pressure injury- 1.0yto0fut6.1cm- wound base minimal moist tan-black fixed eschar. Linear like pressure injury area of purple maroon discoloration in periwound skin overlying right sacrum, without palpable skin changes. No induration, no fluctuance, no crepitus. Scant serous drainage, no odor. No associated cellulitis. No sharp debridement indicated.  Psych: pleasant, calm, cooperative.    LABS/ CULTURES/ RADIOLOGY:                        15.4   6.22  )-----------( 300      ( 08 Dec 2022 06:00 )             48.7       150  |  115  |  22  ----------------------------<  141      [12-08-22 @ 06:00]  3.5   |  23  |  0.81        Ca     9.0     [12-08-22 @ 06:00]      Mg     2.10     [12-07-22 @ 22:10]      Phos  2.7     [12-07-22 @ 22:10]    TPro  7.5  /  Alb  3.2  /  TBili  1.8  /  DBili  x   /  AST  14  /  ALT  13  /  AlkPhos  203  [12-07-22 @ 13:58]    PT/INR: PT 17.7 , INR 1.52       [12-07-22 @ 13:58]  PTT: 34.0       [12-07-22 @ 13:58]

## 2022-12-08 NOTE — DIETITIAN INITIAL EVALUATION ADULT - PERTINENT LABORATORY DATA
12-08    150<H>  |  115<H>  |  22  ----------------------------<  141<H>  3.5   |  23  |  0.81    Ca    9.0      08 Dec 2022 06:00  Phos  2.7     12-07  Mg     2.10     12-07    TPro  7.5  /  Alb  3.2<L>  /  TBili  1.8<H>  /  DBili  x   /  AST  14  /  ALT  13  /  AlkPhos  203<H>  12-07  A1C with Estimated Average Glucose Result: 6.5 % (10-14-22 @ 10:05)   12-08 Na 150 mmol/L<H> Glu 141 mg/dL<H> K+ 3.5 mmol/L Cr 0.81 mg/dL BUN 22 mg/dL Phos n/a      A1C with Estimated Average Glucose Result: 6.5 % (10-14-22 @ 10:05)

## 2022-12-08 NOTE — PHYSICAL THERAPY INITIAL EVALUATION ADULT - NSPTDISCHREC_GEN_A_CORE
Return to Nursing home with previous level of care
as per  note, pt's daughters requesting patient to be discharged to Subacute rehab with eventual transition to long term care./Sub-acute Rehab

## 2022-12-08 NOTE — CONSULT NOTE ADULT - CONSULT REASON
Left sacrum unstageable pressure injury- present on arrival  Left distal great toe deep tissue pressure injury

## 2022-12-08 NOTE — DIETITIAN INITIAL EVALUATION ADULT - NSFNSPHYEXAMSKINFT_GEN_A_CORE
Pressure Injury 1: Left:,sacrum, none  Pressure Injury 2: none, none  Pressure Injury 3: none, none  Pressure Injury 4: none, none  Pressure Injury 5: none, none  Pressure Injury 6: none, none  Pressure Injury 7: none, none  Pressure Injury 8: none, none  Pressure Injury 9: none, none  Pressure Injury 10: none, none  Pressure Injury 11: none, none, Pressure Injury 1: none, none  Pressure Injury 2: Left:,tongue,great toe, Suspected deep tissue injury  Pressure Injury 3: none, none  Pressure Injury 4: none, none  Pressure Injury 5: none, none  Pressure Injury 6: none, none  Pressure Injury 7: none, none  Pressure Injury 8: none, none  Pressure Injury 9: none, none  Pressure Injury 10: none, none  Pressure Injury 11: none, none Pressure Injury 1: Left:,sacrum, none  Pressure Injury 2: Left:great toe, Suspected deep tissue injury

## 2022-12-08 NOTE — PHYSICAL THERAPY INITIAL EVALUATION ADULT - PLANNED THERAPY INTERVENTIONS, PT EVAL
Patient left positioned for safety in bed in NAD, call bell in reach, all lines intact. +bed alarm. Enhanced supervision present./balance training/bed mobility training/gait training/strengthening/transfer training

## 2022-12-08 NOTE — DIETITIAN INITIAL EVALUATION ADULT - ADD RECOMMEND
1) Recommend continue regular diet to optimize PO intake   2) Recommend puree diet per family/pt preference.  3) Recommend Ensure Plus High Protein 1 PO Twice Daily (700 kcal, 40 gm protein)   4)  department to provide Magic Cup 2x/day (580 kcal, 18 gm protein)   5) Continue multivitamin and vitamin C to aid in wound healing.  6) Replete electrolytes prn.   7) If within GOC and NGT placed, reconsult RD to TF recs.

## 2022-12-08 NOTE — PHYSICAL THERAPY INITIAL EVALUATION ADULT - PERTINENT HX OF CURRENT PROBLEM, REHAB EVAL
89 year old man with PMHx afib on Eliquis, PVD d/t prior DVTs, dementia (A&Ox1 at baseline), HTN, kidney stones, choledocholithiasis, bed sore presents to ED due to failure to thrive and abdominal pain. Patient was recently admitted to [facility] for abdominal pain, found to have choledocholithiasis without intervention. He was discharged to a nursing home, where he had been x4 months, but family had since taken him home because they did not feel he was being treated well. At home, he is being taken care of by family, and he has stopped eating and drinking for the past month, complaining of stomach pain, worsened over the past week, not sleeping, and stopped walking/standing due to weakness. He had a fall one month ago for which he was brought to the hospital. Family also endorses large clots with dark urine, no BM x 1.5 weeks.

## 2022-12-08 NOTE — PHYSICAL THERAPY INITIAL EVALUATION ADULT - GENERAL OBSERVATIONS, REHAB EVAL
Pt found in bed; head of bed elevated. Pt agreeable to PT.
Pt found in bed; patient agreeable to PT.

## 2022-12-08 NOTE — DIETITIAN INITIAL EVALUATION ADULT - HEIGHT FOR BMI (INCHES)
Doing well.  Flutters   Denies contractions, bleeding, or leakage of fluid.     Discussed:  AFP, anatomy US, UTI, kidney stones, fetal movement    Plan:  AFP  3/24/20    Return to office in 4 weeks for prenatal care and as needed.    SPENSER Jim, CNM    
11

## 2022-12-08 NOTE — PHYSICAL THERAPY INITIAL EVALUATION ADULT - PHYSICAL ASSIST/NONPHYSICAL ASSIST, REHAB EVAL
verbal cues/nonverbal cues (demo/gestures)/1 person assist I personally performed the service described in the documentation recorded by the scribe in my presence, and it accurately and completely records my words and actions.

## 2022-12-08 NOTE — CONSULT NOTE ADULT - ASSESSMENT
Assessment/Plan: 89 year old man with PMHx afib on Eliquis, PVD d/t prior DVTs, dementia (A&Ox1 at baseline), HTN, kidney stones, choledocholithiasis, bed sore presents to ED due to failure to thrive and abdominal pain. Labs consistent with poor PO intake and choledocholithiasis as well as hematuria.    Wound Consult requested to assist w/ management of Left sacrum unstageable pressure injury and left distal great toe deep tissue pressure injury.    Left sacrum unstageable pressure injury  - Linear like pressure injury localized to left sacrum with area of purple maroon discoloration in periwound skin overlying right sacrum, without palpable skin changes.  - Fixed minimally moist tan-black eschar.  - No induration, no fluctuance, no crepitus.  - Scant serous drainage, no odor.  - No associated cellulitis.  - No sharp debridement indicated.  - Topical recommendations: cleanse with NS. Pat dry. Apply Liquid barrier film to periwound skin. Apply medihoney gel to wound base, over with silicone foam with border. Monitor for tissue type changes.  - Continue to offload pressure  - Continue w/ low air loss fluidized bed surface   - Continue turning and positioning w/ offloading assistive devices as per protocol  - Continue perineal care per protocol, continue use of single breathable incontinence pad.  - Waffle Cushion to chair when oob to chair  - Consider penile pouch or condom catheter to contain urine.    Left distal great toe deep tissue pressure injury  - B/L LE with chronic venous changes, (+) hemosiderin staining, trace edema.  - 0.5cmx0.5mx0 purple-maroon discoloration.  - non tender, no drainage, no palpable skin changes.  - Consider obtaining peter/pvr, feet cool to touch, +1 dp pulse, capillary refill sluggish.  - Paint with betadine daily.  - Follow peter/pvr, if abnormal consider Vascular/Podiatry consult. Otherwise, follow up with podiatry outpatient.  - Continue to offload pressure with complete cair boots daily.    -Nutrition Consult for optimization; consider MVI & Vit C to promote wound healing, encourage high quality protein when appropriate  -F/u goals of care discussion regarding enteral feeds.    Upon discharge f/u as outpatient at [facility] [address] [place], [place] [phone]  Findings and plan discussed in detail with patient, primary RN, and Primary team Dr. Cosby. All questions and concerns addressed.  Will continue to follow periodically while in patient, please reconsult earlier as needed.    Thank you for this consult  ROSHAN Crook, KATHLEEN (pager #[phone])    If after 4PM or before 7:30AM on Mon-Friday or weekend/holiday please contact general surgery for urgent matters.   Team A- [id number]   Team B- [id number]  For non-urgent matters e-mail [email or web address]    I spent 55 minutes face to face with this patient of which more than 50% of the time was spent counseling & coordinating care of this pt

## 2022-12-08 NOTE — DIETITIAN INITIAL EVALUATION ADULT - OTHER INFO
Medical course: Per chart 89 year old man with PMHx afib on Eliquis, PVD d/t prior DVTs, dementia (A&Ox1 at baseline), HTN, kidney stones, choledocholithiasis, bed sore presents to ED due to failure to thrive and abdominal pain. Labs consistent with poor PO intake and choledocholithiasis as well as hematuria.    Nutrition interview: Pt A&Ox1, attempted to call daughter with no answer. Collateral obtained from chart and PCA. No recent episodes of nausea, vomiting, diarrhea or constipation, last BM noted on 12/8 per RN flowsheets. Pt on easy to chew diet, receiving in house texture soft and bite sized. No food allergies. Per [facility] weight >/= 200#. Intake is 0-25% per RN flowsheets and per pt. Feeding skills: total feed. Pts PO intake significantly decreased likely 2/2 end stage dementia. Per chart family deferring NGT for now but will consider it. Pt receiving IVF for hydration. Noted with severe fat and muscle wasting. Pt with increased needs 2/2 DTI and sacral wound. Currently receiving multivitamin and vitamin C to aid in wound healing.   Medical course: Per chart 89 year old man with PMHx afib on Eliquis, PVD d/t prior DVTs, dementia (A&Ox1 at baseline), HTN, kidney stones, choledocholithiasis, bed sore presents to ED due to failure to thrive and abdominal pain. Labs consistent with poor PO intake and choledocholithiasis as well as hematuria.    Nutrition interview: Pt A&Ox1. Collateral obtained from daughter on phone, chart, and PCA. No recent episodes of nausea, vomiting, diarrhea or constipation, last BM noted on 12/8 per RN flowsheets. Daughter reports pt was constipated PTA, now on senna. Pt on easy to chew diet, receiving in house texture soft and bite sized. Daughter reports that pt was eating puree food at home, will recommend. No food allergies. Per allison OLIVAS and daughter weight >/= 200#. Intake is 0-25% per RN flowsheets and per pt. Feeding skills: total feed. Pts PO intake significantly decreased likely 2/2 end stage dementia. Per chart family deferring NGT for now but will consider it. Pt receiving IVF for hydration, hypernatremia. Noted with severe fat and muscle wasting. Pt with increased needs 2/2 DTI and unstageable sacral wound. Seen by wound care 12/8. Currently receiving multivitamin and vitamin C to aid in wound healing.

## 2022-12-08 NOTE — DIETITIAN INITIAL EVALUATION ADULT - ORAL NUTRITION SUPPLEMENTS
Recommend Ensure Plus High Protein 1 PO Twice Daily (700 kcal, 40 gm protein)   department to provide Magic Cup 2x/day (580 kcal, 18 gm protein) to promote optimal PO intake

## 2022-12-08 NOTE — PATIENT PROFILE ADULT - FALL HARM RISK - HARM RISK INTERVENTIONS
Assistance with ambulation/Assistance OOB with selected safe patient handling equipment/Communicate Risk of Fall with Harm to all staff/Discuss with provider need for PT consult/Monitor gait and stability/Reinforce activity limits and safety measures with patient and family/Reorient to person, place and time as needed/Tailored Fall Risk Interventions/Use of alarms - bed, chair and/or voice tab/Visual Cue: Yellow wristband and red socks/Bed in lowest position, wheels locked, appropriate side rails in place/Call bell, personal items and telephone in reach/Instruct patient to call for assistance before getting out of bed or chair/Non-slip footwear when patient is out of bed/[place] to call system/Physically safe environment - no spills, clutter or unnecessary equipment/Purposeful Proactive Rounding/Room/bathroom lighting operational, light cord in reach/Unable to comprehend

## 2022-12-08 NOTE — DIETITIAN INITIAL EVALUATION ADULT - PERTINENT MEDS FT
MEDICATIONS  (STANDING):  apixaban 5 milliGRAM(s) Oral two times a day  ascorbic acid 500 milliGRAM(s) Oral daily  calcium carbonate 1250 mG  + Vitamin D (OsCal 500 + D) 1 Tablet(s) Oral daily  carvedilol 3.125 milliGRAM(s) Oral every 12 hours  chlorhexidine 2% Cloths 1 Application(s) Topical daily  cholecalciferol 1000 Unit(s) Oral daily  dextrose 5%. 1000 milliLiter(s) (75 mL/Hr) IV Continuous <Continuous>  melatonin 6 milliGRAM(s) Oral at bedtime  multivitamin 1 Tablet(s) Oral daily  risperiDONE   Tablet 0.5 milliGRAM(s) Oral at bedtime  senna 2 Tablet(s) Oral at bedtime  tamsulosin 0.4 milliGRAM(s) Oral at bedtime    MEDICATIONS  (PRN):  acetaminophen     Tablet .. 650 milliGRAM(s) Oral every 6 hours PRN Temp greater or equal to 38C (100.4F), Mild Pain (1 - 3)  ondansetron Injectable 4 milliGRAM(s) IV Push every 8 hours PRN Nausea and/or Vomiting

## 2022-12-08 NOTE — DIETITIAN INITIAL EVALUATION ADULT - PROBLEM SELECTOR PROBLEM 4
LF was 8.11.16. LOV was 10.28.16. Please approve or deny pending rx per protocol. Thank you. Dementia

## 2022-12-09 PROCEDURE — 99232 SBSQ HOSP IP/OBS MODERATE 35: CPT

## 2022-12-09 RX ADMIN — APIXABAN 5 MILLIGRAM(S): 2.5 TABLET, FILM COATED ORAL at 18:52

## 2022-12-09 RX ADMIN — Medication 1 TABLET(S): at 12:08

## 2022-12-09 RX ADMIN — SENNA PLUS 2 TABLET(S): 8.6 TABLET ORAL at 22:45

## 2022-12-09 RX ADMIN — Medication 6 MILLIGRAM(S): at 22:44

## 2022-12-09 RX ADMIN — Medication 500 MILLIGRAM(S): at 12:08

## 2022-12-09 RX ADMIN — CARVEDILOL PHOSPHATE 3.12 MILLIGRAM(S): 80 CAPSULE, EXTENDED RELEASE ORAL at 06:17

## 2022-12-09 RX ADMIN — CARVEDILOL PHOSPHATE 3.12 MILLIGRAM(S): 80 CAPSULE, EXTENDED RELEASE ORAL at 18:52

## 2022-12-09 RX ADMIN — TAMSULOSIN HYDROCHLORIDE 0.4 MILLIGRAM(S): 0.4 CAPSULE ORAL at 22:44

## 2022-12-09 RX ADMIN — CHLORHEXIDINE GLUCONATE 1 APPLICATION(S): 213 SOLUTION TOPICAL at 12:25

## 2022-12-09 RX ADMIN — Medication 1000 UNIT(S): at 12:08

## 2022-12-09 RX ADMIN — RISPERIDONE 0.5 MILLIGRAM(S): 4 TABLET ORAL at 22:44

## 2022-12-09 RX ADMIN — APIXABAN 5 MILLIGRAM(S): 2.5 TABLET, FILM COATED ORAL at 06:17

## 2022-12-09 NOTE — ED ADULT TRIAGE NOTE - LOCATION:
"CHIEF COMPLAINT:  Hyperlipidemia and Follow-up      HPI: Patient is here to follow-up on hyperlipidemia coronary artery disease recent fatigue weakness, general malaise COPD,, here to follow-up with recent labs November 29, 2022    Still working on trying to figure out the rash, has had allergy testing, he is also had a small skin cancer on his back area removed by dermatology    follow-up with general malaise not feeling well no energy does not want to do much not sure if it is depression or fatigue, says the medicine we gave him previously Lexapro is not really helping much he was taking it doubled but that seemed to make him worse, so he cut it back to once a day, he continues to lose a little bit of weight his wife is concerned because he sweats in the morning,    Objective   Vital Signs  Vitals:    12/09/22 1022   BP: 119/75   Pulse: 75   Temp: 97.7 °F (36.5 °C)   SpO2: 94%   Weight: 102 kg (223 lb 12.8 oz)   Height: 175.3 cm (69.02\")      Body mass index is 33.03 kg/m².  Review of Systems   Constitutional: Positive for fatigue.   HENT: Negative.    Eyes: Negative.    Respiratory: Positive for shortness of breath.    Cardiovascular: Negative.  Negative for chest pain.   Gastrointestinal: Negative.    Endocrine: Negative.    Genitourinary: Negative.    Musculoskeletal: Negative.    Allergic/Immunologic: Negative.    Neurological: Positive for weakness.   Hematological: Negative.    Psychiatric/Behavioral: Negative.  Positive for depressed mood.   Rash persist,  Physical Exam  Constitutional:       General: He is not in acute distress.     Appearance: Normal appearance.   HENT:      Head: Normocephalic.      Mouth/Throat:      Mouth: Mucous membranes are moist.   Eyes:      Conjunctiva/sclera: Conjunctivae normal.      Pupils: Pupils are equal, round, and reactive to light.   Cardiovascular:      Rate and Rhythm: Normal rate and regular rhythm.      Pulses: Normal pulses.      Heart sounds: Normal heart sounds. "   Pulmonary:      Effort: Pulmonary effort is normal.      Breath sounds: Normal breath sounds.   Abdominal:      General: Bowel sounds are normal.      Palpations: Abdomen is soft.   Musculoskeletal:         General: No swelling. Normal range of motion.      Cervical back: Neck supple.   Skin:     General: Skin is warm and dry.      Coloration: Skin is not jaundiced.   Neurological:      General: No focal deficit present.      Mental Status: He is alert and oriented to person, place, and time. Mental status is at baseline.   Psychiatric:         Behavior: Behavior normal.         Thought Content: Thought content normal.         Judgment: Judgment normal.     Patient with diffuse patchy rash on the right mid to upper abdominal wall, chronic looking, also area on the left lateral chest wall abdominal wall area smaller, and in his scalp line,  Result Review :   Lab Results   Component Value Date    PROBNP 915.0 (H) 07/27/2022    PROBNP 1,455.0 (H) 05/24/2022    PROBNP 2,841.0 (H) 05/10/2022    BNP 1937 (H) 03/10/2021    BNP 1128 (H) 11/02/2020    BNP 1802 (H) 10/14/2020     CMP    CMP 9/8/22 10/13/22 10/13/22 11/29/22     1300 1300    Glucose 62 (A)   79   BUN 20 22  25 (A)   Creatinine 1.88 (A)  1.72 (A) 1.78 (A)   Sodium 145   143   Potassium 4.6   4.3   Chloride 103   102   Calcium 9.8   9.7   Albumin 3.70   3.90   Total Bilirubin 0.7   0.3   Alkaline Phosphatase 35 (A)   45   AST (SGOT) 26   23   ALT (SGPT) 17   17   (A) Abnormal value            CBC w/diff    CBC w/Diff 7/27/22 9/8/22 11/29/22   WBC 11.26 (A) 10.63 13.46 (A)   RBC 5.71 5.44 5.36   Hemoglobin 15.4 14.7 15.1   Hematocrit 47.5 45.7 46.3   MCV 83.2 84.0 86.4   MCH 27.0 27.0 28.2   MCHC 32.4 32.2 32.6   RDW 15.0 15.3 14.6   Platelets 203 227 215   Neutrophil Rel % 53.6 61.3 63.5   Immature Granulocyte Rel % 0.4 0.5 0.7 (A)   Lymphocyte Rel % 31.9 28.1 24.1   Monocyte Rel % 9.1 7.1 7.7   Eosinophil Rel % 4.2 2.2 3.3   Basophil Rel % 0.8 0.8 0.7   (A)  Abnormal value             Lipid Panel    Lipid Panel 1/25/22 7/27/22 11/29/22   Total Cholesterol 120 151 124   Triglycerides 159 (A) 127 112   HDL Cholesterol 24 (A) 34 (A) 35 (A)   VLDL Cholesterol 28 23 21   LDL Cholesterol  68 94 68   LDL/HDL Ratio 2.68 2.69 1.90   (A) Abnormal value             Lab Results   Component Value Date    TSH 3.660 11/29/2022    TSH 2.300 10/13/2022    TSH 1.160 09/08/2022      Lab Results   Component Value Date    FREET4 1.47 11/29/2022    FREET4 1.48 10/13/2022    FREET4 1.60 09/08/2022      A1C Last 3 Results    HGBA1C Last 3 Results 7/27/22 9/8/22 11/29/22   Hemoglobin A1C 6.10 (A) 6.20 (A) 5.60   (A) Abnormal value             PSA    PSA 7/28/22 9/8/22   PSA 0.680 0.678                          Visit Diagnoses:    ICD-10-CM ICD-9-CM   1. Stage 3a chronic kidney disease ([facility])  N18.31 585.3   2. Cancer ([facility])  C80.1 199.1   3. Peripheral artery disease ([facility])  I73.9 443.9   4. Abdominal aortic aneurysm (AAA) without rupture, unspecified part  I71.40 441.4   5. Chronic combined systolic and diastolic congestive heart failure ([facility])  I50.42 428.42     428.0   6. Fatigue, unspecified type  R53.83 780.79   7. Mixed hyperlipidemia  E78.2 272.2   8. Centrilobular emphysema ([facility])  J43.2 492.8   9. Coronary artery disease involving coronary bypass graft of native heart without angina pectoris  I25.810 414.05   10. PVD (peripheral vascular disease) with claudication ([facility])  I73.9 443.9   11. Lethargy  R53.83 780.79   12. Acquired atrophy of thyroid  E03.4 246.8   13. Type 2 diabetes mellitus with diabetic autonomic neuropathy, without long-term current use of insulin ([facility])  E11.43 250.60     337.1   14. Hypertension, essential  I10 401.9   15. Occlusion and stenosis of bilateral carotid arteries  I65.23 433.10     433.30   16. CAD S/P percutaneous coronary angioplasty  I25.10 414.01    Z98.61 V45.82   17. S/p bilateral carotid endarterectomy  Z98.890 V45.89   18. Elevated liver enzymes  R74.8  790.5   19. Gastroesophageal reflux disease without esophagitis  K21.9 530.81   20. Type 2 diabetes mellitus without complication, without long-term current use of insulin (HCC)  E11.9 250.00   21. Leukocytosis, unspecified type  D72.829 288.60   22. Screening PSA (prostate specific antigen)  Z12.5 V76.44       Assessment and Plan   Diagnoses and all orders for this visit:    1. Stage 3a chronic kidney disease (HCC) (Primary)  -     Comprehensive Metabolic Panel; Future  -     CBC & Differential; Future  -     TSH+Free T4; Future  -     Sedimentation Rate; Future  -     Cancel: CBC & Differential; Future  -     PSA Screen; Future  -     Lipid Panel; Future    2. Cancer (HCC)  -     Comprehensive Metabolic Panel; Future  -     CBC & Differential; Future  -     TSH+Free T4; Future  -     Sedimentation Rate; Future  -     Cancel: CBC & Differential; Future  -     PSA Screen; Future  -     Lipid Panel; Future    3. Peripheral artery disease (HCC)  -     Comprehensive Metabolic Panel; Future  -     CBC & Differential; Future  -     TSH+Free T4; Future  -     Sedimentation Rate; Future  -     Cancel: CBC & Differential; Future  -     PSA Screen; Future  -     Lipid Panel; Future    4. Abdominal aortic aneurysm (AAA) without rupture, unspecified part  -     Comprehensive Metabolic Panel; Future  -     CBC & Differential; Future  -     TSH+Free T4; Future  -     Sedimentation Rate; Future  -     Cancel: CBC & Differential; Future  -     PSA Screen; Future  -     Lipid Panel; Future    5. Chronic combined systolic and diastolic congestive heart failure (HCC)  -     Comprehensive Metabolic Panel; Future  -     CBC & Differential; Future  -     TSH+Free T4; Future  -     Sedimentation Rate; Future  -     Cancel: CBC & Differential; Future  -     PSA Screen; Future  -     Lipid Panel; Future    6. Fatigue, unspecified type  -     Comprehensive Metabolic Panel; Future  -     CBC & Differential; Future  -     TSH+Free T4;  Future  -     Sedimentation Rate; Future  -     Cancel: CBC & Differential; Future  -     PSA Screen; Future  -     Lipid Panel; Future    7. Mixed hyperlipidemia  -     Comprehensive Metabolic Panel; Future  -     CBC & Differential; Future  -     TSH+Free T4; Future  -     Sedimentation Rate; Future  -     Cancel: CBC & Differential; Future  -     PSA Screen; Future  -     Lipid Panel; Future    8. Centrilobular emphysema (HCC)  -     Comprehensive Metabolic Panel; Future  -     CBC & Differential; Future  -     TSH+Free T4; Future  -     Sedimentation Rate; Future  -     Cancel: CBC & Differential; Future  -     PSA Screen; Future  -     Lipid Panel; Future    9. Coronary artery disease involving coronary bypass graft of native heart without angina pectoris  -     Comprehensive Metabolic Panel; Future  -     CBC & Differential; Future  -     TSH+Free T4; Future  -     Sedimentation Rate; Future  -     Cancel: CBC & Differential; Future  -     PSA Screen; Future  -     Lipid Panel; Future    10. PVD (peripheral vascular disease) with claudication (HCC)  -     Comprehensive Metabolic Panel; Future  -     CBC & Differential; Future  -     TSH+Free T4; Future  -     Sedimentation Rate; Future  -     Cancel: CBC & Differential; Future  -     PSA Screen; Future  -     Lipid Panel; Future    11. Lethargy  -     Comprehensive Metabolic Panel; Future  -     CBC & Differential; Future  -     TSH+Free T4; Future  -     Sedimentation Rate; Future  -     Cancel: CBC & Differential; Future  -     PSA Screen; Future  -     Lipid Panel; Future    12. Acquired atrophy of thyroid  -     Comprehensive Metabolic Panel; Future  -     CBC & Differential; Future  -     TSH+Free T4; Future  -     Sedimentation Rate; Future  -     Cancel: CBC & Differential; Future  -     PSA Screen; Future  -     Lipid Panel; Future    13. Type 2 diabetes mellitus with diabetic autonomic neuropathy, without long-term current use of insulin (HCC)  -      Comprehensive Metabolic Panel; Future  -     CBC & Differential; Future  -     TSH+Free T4; Future  -     Sedimentation Rate; Future  -     Cancel: CBC & Differential; Future  -     PSA Screen; Future  -     Lipid Panel; Future    14. Hypertension, essential  -     Comprehensive Metabolic Panel; Future  -     CBC & Differential; Future  -     TSH+Free T4; Future  -     Sedimentation Rate; Future  -     Cancel: CBC & Differential; Future  -     PSA Screen; Future  -     Lipid Panel; Future    15. Occlusion and stenosis of bilateral carotid arteries  -     Comprehensive Metabolic Panel; Future  -     CBC & Differential; Future  -     TSH+Free T4; Future  -     Sedimentation Rate; Future  -     Cancel: CBC & Differential; Future  -     PSA Screen; Future  -     Lipid Panel; Future    16. CAD S/P percutaneous coronary angioplasty  -     Comprehensive Metabolic Panel; Future  -     CBC & Differential; Future  -     TSH+Free T4; Future  -     Sedimentation Rate; Future  -     Cancel: CBC & Differential; Future  -     PSA Screen; Future  -     Lipid Panel; Future    17. S/p bilateral carotid endarterectomy  -     Comprehensive Metabolic Panel; Future  -     CBC & Differential; Future  -     TSH+Free T4; Future  -     Sedimentation Rate; Future  -     Cancel: CBC & Differential; Future  -     PSA Screen; Future  -     Lipid Panel; Future    18. Elevated liver enzymes  -     Comprehensive Metabolic Panel; Future  -     CBC & Differential; Future  -     TSH+Free T4; Future  -     Sedimentation Rate; Future  -     Cancel: CBC & Differential; Future  -     PSA Screen; Future  -     Lipid Panel; Future    19. Gastroesophageal reflux disease without esophagitis  -     Comprehensive Metabolic Panel; Future  -     CBC & Differential; Future  -     TSH+Free T4; Future  -     Sedimentation Rate; Future  -     Cancel: CBC & Differential; Future  -     PSA Screen; Future  -     Lipid Panel; Future    20. Type 2 diabetes mellitus  without complication, without long-term current use of insulin (HCC)  -     Comprehensive Metabolic Panel; Future  -     CBC & Differential; Future  -     TSH+Free T4; Future  -     Sedimentation Rate; Future  -     Cancel: CBC & Differential; Future  -     PSA Screen; Future  -     Lipid Panel; Future    21. Leukocytosis, unspecified type  -     Cancel: CBC & Differential; Future  -     PSA Screen; Future  -     Lipid Panel; Future    22. Screening PSA (prostate specific antigen)  -     PSA Screen; Future  -     Lipid Panel; Future    Rash, etiology is unclear currently going work-up with dermatology has had biopsies, we have tried eliminating some medications he stopped some medications, discussed trying Zyrtec 10 mg at bedtime and Pepcid 20 twice daily December 9, 2022--- patient has stopped his gabapentin, Wellbutrin, Lexapro, organ to stop fenofibrate, iron sulfate,    Elevated white count 13.4 etiology unclear, other indices are all normal November 29, 2022 this is secondary to recent steroids and injections he is gotten from dermatology for his rash, December 9, 2022    Lethargy fatigue, depression, etiology is unclear ------medications reviewed September 13, 2022,--- lab work noted September 8, 2022-, and November 29, 2022,---, patient also stopped gabapentin, Wellbutrin , Lexapro September 13th, 2022    Obstructive jaundice, MRCP shows choledocholithiasis, intra and extrahepatic biliary duct dilation December 6, 2021,, patient underwent ERCP and had stone removal on 2 different occasions,  initially by Dr. Hernandez and subsequently by Dr. Zepeda, with biliary stent initially, patient says he is doing better overall,, December 2022, current liver and bilirubin levels are all normal    Hospital stay transitional care visit October 14 through October 20, 2021 with acute right lower lobe pneumonia, acute hypoxic respiratory failure shortness of breath ----CT scan showed groundglass opacity coronavirus testing  ??2 was negative in the hospital,     transtional care visit from repair LEFT FEM. --RECONTRUCTION, AND ATERECTOMY---SEPT 14, 2020, ---PERIPHERAL ASO ---Patient is having claudication and leg pain with weakness, arterial evaluation shows bilateral proximal level occlusive disease either just below the aorta or at the aortic repair level, ---Patient is status post left femoral iliac artery atherectomy grafting and angioplasty by Dr. Nam at [facility], September 14, 2020, ---- carotid ultrasound shows no significant stenosis,----- August 2020 compared to previous and prior ultrasounds,-    transtional care , hosp 8/5/2020 for R LL PNEUMONIA, AND ACUTE DIASTOLIC CHF, LASHA ON CKD 3---    Back pain/ spinal stenosis continues on MS Contin 30 mg twice a day,, stop gabapentin    MILD DEPRESSION --patient stopped lexapro 5 mg daily, bupropion xl 150 mg qd     Type 2 Diabetes , continues on Levemir 40 units daily and Metformin 500mg bid-----hemoglobin A1c 5.6 November 29, 2022    Colon cancer PARTIAL COLECTOMY 2010 - , CEA elevated  7.7, -- Dr. Ibanez---Colonoscopy November 2018 unremarkable----CEA level 2.5 April 2020    B12 deficiency continues on over-the-counter replacement     GENET---sees sleep specialist -continues on CPAP;     Angina, cardiac catheter jan 2018, with 80-90% stenosis circumflex proximal to vein graft, LIMA graft to LAD was patent, normal ejection fraction January 2018, patient had elective semi-elective stent placement to the circumflex artery, JAN 23, 2018, --Patient currently off BRILLINTA and on Plavix with much improved and breathing status as of July 2018----previous echo October 2020 showed mild mitral valve annular calcification, no significant prolapse, or regurgitation, ejection fraction 55 to 60% diastolic dysfunction noted otherwise unremarkable, ----------------echo shows some inferior wall basilar wall posterior wall hypokinesis with a slightly depressed ejection fraction of 46 to  50%,--, mild mitral regurgitation and tricuspid regurgitation, discussed with patient wife April 14, 2022,    R HIP PAIN, GLUTEUS MUSCLE , PARASPINAL MUSCLE ATROPHY, S/P INFARCTION AFTER AAA REPAIR IN 3/13-, status post radiofrequency ablation X 2, by pain management --- continues morphine 30 mg er  twice a day,     Elevated cholesterol--continues Crestor 20, fenofibrate 145 mg daily, August 2, 2022,, much improved with change, LDL now at 68 down from 94,    November 2022, discussed with patient about stopping fenofibrate for the next 4 months December 8, 2022 to see if his numbers improved change or if the rash improves,    Anemia by history patient can stop iron current levels are excellent, November 2022    CAROTID ASO, HAS YRLY F/U WITH VASC SURG --- Dr. Chula Alfaro    HYPOTHRYOIDISM--continues levothyroxine 0.125 mg qd     HTN----continues Norvasc 2.5 mg daily,  Lasix 40 mg QD  , metoprolol extended release 50 mg BID    CKD 3-a- stable --, creatinine 1.78, November 29, 2022    GOUT,  Now on probenicid 500 mg qd per dr urbina , colchicine,0.1 PRN ----stopped allopurinol sept 2022 but no improvement    PSA at 0.6, July 2022    Follow Up   Return in about 4 months (around 4/9/2023).  Patient was given instructions and counseling regarding his condition or for health maintenance advice. Please see specific information pulled into the AVS if appropriate.         Answers for HPI/ROS submitted by the patient on 12/8/2022  What is the primary reason for your visit?: Other  Please describe your symptoms.: Followup to get results of blood test  Have you had these symptoms before?: No  How long have you been having these symptoms?: 1-4 days  Please list any medications you are currently taking for this condition.: Regular check up does not apply  Please describe any probable cause for these symptoms. : None. Regular follow up of blood test results       Left arm;

## 2022-12-10 LAB
ANION GAP SERPL CALC-SCNC: 10 MMOL/L — SIGNIFICANT CHANGE UP (ref 7–14)
BUN SERPL-MCNC: 18 MG/DL — SIGNIFICANT CHANGE UP (ref 7–23)
CALCIUM SERPL-MCNC: 8.9 MG/DL — SIGNIFICANT CHANGE UP (ref 8.4–10.5)
CHLORIDE SERPL-SCNC: 116 MMOL/L — HIGH (ref 98–107)
CO2 SERPL-SCNC: 21 MMOL/L — LOW (ref 22–31)
CREAT SERPL-MCNC: 0.63 MG/DL — SIGNIFICANT CHANGE UP (ref 0.5–1.3)
EGFR: 84 ML/MIN/1.73M2 — SIGNIFICANT CHANGE UP
GLUCOSE SERPL-MCNC: 146 MG/DL — HIGH (ref 70–99)
HCT VFR BLD CALC: 42.4 % — SIGNIFICANT CHANGE UP (ref 39–50)
HGB BLD-MCNC: 13.7 G/DL — SIGNIFICANT CHANGE UP (ref 13–17)
MAGNESIUM SERPL-MCNC: 2.2 MG/DL — SIGNIFICANT CHANGE UP (ref 1.6–2.6)
MCHC RBC-ENTMCNC: 26.7 PG — LOW (ref 27–34)
MCHC RBC-ENTMCNC: 32.3 GM/DL — SIGNIFICANT CHANGE UP (ref 32–36)
MCV RBC AUTO: 82.7 FL — SIGNIFICANT CHANGE UP (ref 80–100)
NRBC # BLD: 0 /100 WBCS — SIGNIFICANT CHANGE UP (ref 0–0)
NRBC # FLD: 0 K/UL — SIGNIFICANT CHANGE UP (ref 0–0)
PHOSPHATE SERPL-MCNC: 2.8 MG/DL — SIGNIFICANT CHANGE UP (ref 2.5–4.5)
PLATELET # BLD AUTO: 360 K/UL — SIGNIFICANT CHANGE UP (ref 150–400)
POTASSIUM SERPL-MCNC: 3.3 MMOL/L — LOW (ref 3.5–5.3)
POTASSIUM SERPL-SCNC: 3.3 MMOL/L — LOW (ref 3.5–5.3)
RBC # BLD: 5.13 M/UL — SIGNIFICANT CHANGE UP (ref 4.2–5.8)
RBC # FLD: 18.2 % — HIGH (ref 10.3–14.5)
SODIUM SERPL-SCNC: 147 MMOL/L — HIGH (ref 135–145)
WBC # BLD: 6.48 K/UL — SIGNIFICANT CHANGE UP (ref 3.8–10.5)
WBC # FLD AUTO: 6.48 K/UL — SIGNIFICANT CHANGE UP (ref 3.8–10.5)

## 2022-12-10 PROCEDURE — 99232 SBSQ HOSP IP/OBS MODERATE 35: CPT

## 2022-12-10 RX ORDER — POTASSIUM CHLORIDE 20 MEQ
10 PACKET (EA) ORAL
Refills: 0 | Status: COMPLETED | OUTPATIENT
Start: 2022-12-10 | End: 2022-12-10

## 2022-12-10 RX ORDER — SODIUM CHLORIDE 9 MG/ML
1000 INJECTION, SOLUTION INTRAVENOUS
Refills: 0 | Status: DISCONTINUED | OUTPATIENT
Start: 2022-12-10 | End: 2022-12-20

## 2022-12-10 RX ADMIN — APIXABAN 5 MILLIGRAM(S): 2.5 TABLET, FILM COATED ORAL at 06:32

## 2022-12-10 RX ADMIN — Medication 6 MILLIGRAM(S): at 22:28

## 2022-12-10 RX ADMIN — SENNA PLUS 2 TABLET(S): 8.6 TABLET ORAL at 22:28

## 2022-12-10 RX ADMIN — CARVEDILOL PHOSPHATE 3.12 MILLIGRAM(S): 80 CAPSULE, EXTENDED RELEASE ORAL at 17:34

## 2022-12-10 RX ADMIN — SODIUM CHLORIDE 75 MILLILITER(S): 9 INJECTION, SOLUTION INTRAVENOUS at 15:15

## 2022-12-10 RX ADMIN — Medication 1 TABLET(S): at 11:42

## 2022-12-10 RX ADMIN — Medication 100 MILLIEQUIVALENT(S): at 15:14

## 2022-12-10 RX ADMIN — Medication 100 MILLIEQUIVALENT(S): at 17:32

## 2022-12-10 RX ADMIN — Medication 1000 UNIT(S): at 11:42

## 2022-12-10 RX ADMIN — TAMSULOSIN HYDROCHLORIDE 0.4 MILLIGRAM(S): 0.4 CAPSULE ORAL at 22:28

## 2022-12-10 RX ADMIN — CARVEDILOL PHOSPHATE 3.12 MILLIGRAM(S): 80 CAPSULE, EXTENDED RELEASE ORAL at 06:32

## 2022-12-10 RX ADMIN — CHLORHEXIDINE GLUCONATE 1 APPLICATION(S): 213 SOLUTION TOPICAL at 11:43

## 2022-12-10 RX ADMIN — Medication 100 MILLIEQUIVALENT(S): at 16:12

## 2022-12-10 RX ADMIN — APIXABAN 5 MILLIGRAM(S): 2.5 TABLET, FILM COATED ORAL at 17:34

## 2022-12-10 RX ADMIN — Medication 500 MILLIGRAM(S): at 11:42

## 2022-12-10 RX ADMIN — RISPERIDONE 0.5 MILLIGRAM(S): 4 TABLET ORAL at 22:28

## 2022-12-11 LAB
ANION GAP SERPL CALC-SCNC: 11 MMOL/L — SIGNIFICANT CHANGE UP (ref 7–14)
BUN SERPL-MCNC: 14 MG/DL — SIGNIFICANT CHANGE UP (ref 7–23)
CALCIUM SERPL-MCNC: 8.7 MG/DL — SIGNIFICANT CHANGE UP (ref 8.4–10.5)
CHLORIDE SERPL-SCNC: 110 MMOL/L — HIGH (ref 98–107)
CO2 SERPL-SCNC: 20 MMOL/L — LOW (ref 22–31)
CREAT SERPL-MCNC: 0.62 MG/DL — SIGNIFICANT CHANGE UP (ref 0.5–1.3)
EGFR: 84 ML/MIN/1.73M2 — SIGNIFICANT CHANGE UP
GLUCOSE SERPL-MCNC: 200 MG/DL — HIGH (ref 70–99)
HCT VFR BLD CALC: 43.7 % — SIGNIFICANT CHANGE UP (ref 39–50)
HGB BLD-MCNC: 14.2 G/DL — SIGNIFICANT CHANGE UP (ref 13–17)
MAGNESIUM SERPL-MCNC: 2.1 MG/DL — SIGNIFICANT CHANGE UP (ref 1.6–2.6)
MCHC RBC-ENTMCNC: 27.4 PG — SIGNIFICANT CHANGE UP (ref 27–34)
MCHC RBC-ENTMCNC: 32.5 GM/DL — SIGNIFICANT CHANGE UP (ref 32–36)
MCV RBC AUTO: 84.4 FL — SIGNIFICANT CHANGE UP (ref 80–100)
NRBC # BLD: 0 /100 WBCS — SIGNIFICANT CHANGE UP (ref 0–0)
NRBC # FLD: 0 K/UL — SIGNIFICANT CHANGE UP (ref 0–0)
PHOSPHATE SERPL-MCNC: 2.7 MG/DL — SIGNIFICANT CHANGE UP (ref 2.5–4.5)
PLATELET # BLD AUTO: 361 K/UL — SIGNIFICANT CHANGE UP (ref 150–400)
POTASSIUM SERPL-MCNC: 4.2 MMOL/L — SIGNIFICANT CHANGE UP (ref 3.5–5.3)
POTASSIUM SERPL-SCNC: 4.2 MMOL/L — SIGNIFICANT CHANGE UP (ref 3.5–5.3)
RBC # BLD: 5.18 M/UL — SIGNIFICANT CHANGE UP (ref 4.2–5.8)
RBC # FLD: 19.1 % — HIGH (ref 10.3–14.5)
SARS-COV-2 RNA SPEC QL NAA+PROBE: SIGNIFICANT CHANGE UP
SODIUM SERPL-SCNC: 141 MMOL/L — SIGNIFICANT CHANGE UP (ref 135–145)
WBC # BLD: 6.83 K/UL — SIGNIFICANT CHANGE UP (ref 3.8–10.5)
WBC # FLD AUTO: 6.83 K/UL — SIGNIFICANT CHANGE UP (ref 3.8–10.5)

## 2022-12-11 PROCEDURE — 99232 SBSQ HOSP IP/OBS MODERATE 35: CPT

## 2022-12-11 RX ADMIN — APIXABAN 5 MILLIGRAM(S): 2.5 TABLET, FILM COATED ORAL at 18:56

## 2022-12-11 RX ADMIN — APIXABAN 5 MILLIGRAM(S): 2.5 TABLET, FILM COATED ORAL at 05:18

## 2022-12-11 RX ADMIN — RISPERIDONE 0.5 MILLIGRAM(S): 4 TABLET ORAL at 21:43

## 2022-12-11 RX ADMIN — CARVEDILOL PHOSPHATE 3.12 MILLIGRAM(S): 80 CAPSULE, EXTENDED RELEASE ORAL at 18:59

## 2022-12-11 RX ADMIN — Medication 1000 UNIT(S): at 12:48

## 2022-12-11 RX ADMIN — Medication 1 TABLET(S): at 12:48

## 2022-12-11 RX ADMIN — Medication 6 MILLIGRAM(S): at 21:44

## 2022-12-11 RX ADMIN — Medication 1 TABLET(S): at 12:47

## 2022-12-11 RX ADMIN — SENNA PLUS 2 TABLET(S): 8.6 TABLET ORAL at 21:43

## 2022-12-11 RX ADMIN — TAMSULOSIN HYDROCHLORIDE 0.4 MILLIGRAM(S): 0.4 CAPSULE ORAL at 21:43

## 2022-12-11 RX ADMIN — CARVEDILOL PHOSPHATE 3.12 MILLIGRAM(S): 80 CAPSULE, EXTENDED RELEASE ORAL at 05:18

## 2022-12-11 RX ADMIN — CHLORHEXIDINE GLUCONATE 1 APPLICATION(S): 213 SOLUTION TOPICAL at 12:48

## 2022-12-11 RX ADMIN — Medication 500 MILLIGRAM(S): at 12:48

## 2022-12-12 ENCOUNTER — TRANSCRIPTION ENCOUNTER (OUTPATIENT)
Age: 87
End: 2022-12-12

## 2022-12-12 LAB
ANION GAP SERPL CALC-SCNC: 7 MMOL/L — SIGNIFICANT CHANGE UP (ref 7–14)
BUN SERPL-MCNC: 14 MG/DL — SIGNIFICANT CHANGE UP (ref 7–23)
CALCIUM SERPL-MCNC: 8.6 MG/DL — SIGNIFICANT CHANGE UP (ref 8.4–10.5)
CHLORIDE SERPL-SCNC: 114 MMOL/L — HIGH (ref 98–107)
CO2 SERPL-SCNC: 23 MMOL/L — SIGNIFICANT CHANGE UP (ref 22–31)
CREAT SERPL-MCNC: 0.58 MG/DL — SIGNIFICANT CHANGE UP (ref 0.5–1.3)
CULTURE RESULTS: SIGNIFICANT CHANGE UP
CULTURE RESULTS: SIGNIFICANT CHANGE UP
EGFR: 86 ML/MIN/1.73M2 — SIGNIFICANT CHANGE UP
GLUCOSE SERPL-MCNC: 176 MG/DL — HIGH (ref 70–99)
HCT VFR BLD CALC: 40.4 % — SIGNIFICANT CHANGE UP (ref 39–50)
HGB BLD-MCNC: 13.3 G/DL — SIGNIFICANT CHANGE UP (ref 13–17)
MAGNESIUM SERPL-MCNC: 2.1 MG/DL — SIGNIFICANT CHANGE UP (ref 1.6–2.6)
MCHC RBC-ENTMCNC: 26.9 PG — LOW (ref 27–34)
MCHC RBC-ENTMCNC: 32.9 GM/DL — SIGNIFICANT CHANGE UP (ref 32–36)
MCV RBC AUTO: 81.8 FL — SIGNIFICANT CHANGE UP (ref 80–100)
NRBC # BLD: 0 /100 WBCS — SIGNIFICANT CHANGE UP (ref 0–0)
NRBC # FLD: 0 K/UL — SIGNIFICANT CHANGE UP (ref 0–0)
PHOSPHATE SERPL-MCNC: 2.5 MG/DL — SIGNIFICANT CHANGE UP (ref 2.5–4.5)
PLATELET # BLD AUTO: 370 K/UL — SIGNIFICANT CHANGE UP (ref 150–400)
POTASSIUM SERPL-MCNC: 3.7 MMOL/L — SIGNIFICANT CHANGE UP (ref 3.5–5.3)
POTASSIUM SERPL-SCNC: 3.7 MMOL/L — SIGNIFICANT CHANGE UP (ref 3.5–5.3)
RBC # BLD: 4.94 M/UL — SIGNIFICANT CHANGE UP (ref 4.2–5.8)
RBC # FLD: 18.5 % — HIGH (ref 10.3–14.5)
SODIUM SERPL-SCNC: 144 MMOL/L — SIGNIFICANT CHANGE UP (ref 135–145)
SPECIMEN SOURCE: SIGNIFICANT CHANGE UP
SPECIMEN SOURCE: SIGNIFICANT CHANGE UP
WBC # BLD: 7.52 K/UL — SIGNIFICANT CHANGE UP (ref 3.8–10.5)
WBC # FLD AUTO: 7.52 K/UL — SIGNIFICANT CHANGE UP (ref 3.8–10.5)

## 2022-12-12 PROCEDURE — 99232 SBSQ HOSP IP/OBS MODERATE 35: CPT

## 2022-12-12 RX ADMIN — CHLORHEXIDINE GLUCONATE 1 APPLICATION(S): 213 SOLUTION TOPICAL at 13:03

## 2022-12-12 RX ADMIN — TAMSULOSIN HYDROCHLORIDE 0.4 MILLIGRAM(S): 0.4 CAPSULE ORAL at 22:56

## 2022-12-12 RX ADMIN — APIXABAN 5 MILLIGRAM(S): 2.5 TABLET, FILM COATED ORAL at 18:38

## 2022-12-12 RX ADMIN — Medication 500 MILLIGRAM(S): at 13:03

## 2022-12-12 RX ADMIN — Medication 1 TABLET(S): at 13:03

## 2022-12-12 RX ADMIN — SENNA PLUS 2 TABLET(S): 8.6 TABLET ORAL at 22:56

## 2022-12-12 RX ADMIN — Medication 1000 UNIT(S): at 13:03

## 2022-12-12 RX ADMIN — RISPERIDONE 0.5 MILLIGRAM(S): 4 TABLET ORAL at 22:56

## 2022-12-12 RX ADMIN — Medication 6 MILLIGRAM(S): at 22:57

## 2022-12-12 RX ADMIN — APIXABAN 5 MILLIGRAM(S): 2.5 TABLET, FILM COATED ORAL at 06:34

## 2022-12-12 RX ADMIN — CARVEDILOL PHOSPHATE 3.12 MILLIGRAM(S): 80 CAPSULE, EXTENDED RELEASE ORAL at 06:34

## 2022-12-12 RX ADMIN — CARVEDILOL PHOSPHATE 3.12 MILLIGRAM(S): 80 CAPSULE, EXTENDED RELEASE ORAL at 18:38

## 2022-12-12 RX ADMIN — Medication 650 MILLIGRAM(S): at 13:02

## 2022-12-12 NOTE — DISCHARGE NOTE PROVIDER - NSDCFUADDAPPT_GEN_ALL_CORE_FT
Upon discharge f/u as outpatient at [facility] [place] [address] [facility] [phone]

## 2022-12-12 NOTE — DISCHARGE NOTE PROVIDER - HOSPITAL COURSE
89 year old man with PMHx afib on Eliquis, PVD d/t prior DVTs, dementia (A&Ox1 at baseline), HTN, kidney stones, choledocholithiasis, bed sore presents to ED due to failure to thrive and abdominal pain. Labs consistent with poor PO intake and choledocholithiasis as well as hematuria.    Hospital course:    Cachexia.   - Lack of PO intake (food or drink) at home x 1 month  - DDx includes progression of dementia vs secondary to abdominal pain from choledocholithiasis.   - Nutrition consulted: Liberalized diet to encourage PO intake  - Family declined NGT   - Family would like to place patient in a long term facility  - Discussed with family, CT a/p cancelled, as patient is comfortable and they would like to hold off any kind of aggressive intervention if anything is found.    Hypernatremia.   - S/p D5  - Resolved     Choledocholithiasis.   - Previously noted, but no intervention performed due to lack of symptoms. T Bili slightly higher, however alk phos lower than prior  - Discussed with family, CT a/p cancelled, as patient is comfortable and they would like to hold off any kind of aggressive intervention if anything is found.    Dementia.   - A&Ox1 at baseline, may have progressed to end-stage with no PO intake.  - Patient is DNR/DNI, MOLST in chart   - Low dose Risperidone  - Family requesting dispo to nursing home as they are unable to care for him at home, plan for dispo to rehab with transition to LTC     Chronic atrial fibrillation.   - On Eliquis 5mg BID.   - Continued carvedilol 3.125mg BID    Hematuria.   - RBCs on UA, family endorsing dark urine with large clots.  - Hgb currently stable  - Continued with tamsulosin .4mg QHS.    Deep tissue injury.   - R sacral ulcer, per RN is stage 3  - Ascorbic acid  - Wound care RN: topical recommendations implemented   - Nutrition consulted: ensure plus protein 2x/day    Pt medically stable for discharge on ____ as per discussion with ____.  Dispo: rehab with transition to LTC    89 year old man with PMHx afib on Eliquis, PVD d/t prior DVTs, dementia (A&Ox1 at baseline), HTN, kidney stones, choledocholithiasis, bed sore presents to ED due to failure to thrive and abdominal pain. Labs consistent with poor PO intake and choledocholithiasis as well as hematuria.    Hospital course:    Cachexia.   - Lack of PO intake (food or drink) at home x 1 month  - DDx includes progression of dementia vs secondary to abdominal pain from choledocholithiasis.   - Nutrition consulted: Liberalized diet to encourage PO intake  - Family declined NGT   - Family would like to place patient in a long term facility  - Discussed with family, CT a/p cancelled, as patient is comfortable and they would like to hold off any kind of aggressive intervention if anything is found.    Hypernatremia.   - S/p D5  - Resolved     Choledocholithiasis.   - Previously noted, but no intervention performed due to lack of symptoms. T Bili slightly higher, however alk phos lower than prior  - Discussed with family, CT a/p cancelled, as patient is comfortable and they would like to hold off any kind of aggressive intervention if anything is found.    Dementia.   - A&Ox1 at baseline, may have progressed to end-stage with no PO intake.  - Patient is DNR/DNI, MOLST in chart   - Low dose Risperidone  - Family requesting dispo to nursing home as they are unable to care for him at home, plan for dispo to rehab with transition to LTC     Chronic atrial fibrillation.   - On Eliquis 5mg BID.   - Continued carvedilol 3.125mg BID    Hematuria.   - RBCs on UA, family endorsing dark urine with large clots.  - Hgb currently stable  - Continued with tamsulosin .4mg QHS.    Deep tissue injury.   - R sacral ulcer, per RN is stage 3  - Ascorbic acid  - Wound care RN: topical recommendations implemented   - Nutrition consulted: ensure plus protein 2x/day    On 12/20/2022 this case was reviewed with Dr. Braden, the patient is medically stable and optimized for discharge. All medications were reviewed and prescriptions were sent to mutually agreed upon pharmacy. 89 year old man with PMHx afib on Eliquis, PVD d/t prior DVTs, dementia (A&Ox1 at baseline), HTN, kidney stones, choledocholithiasis, bed sore presents to ED due to failure to thrive and abdominal pain. Labs consistent with poor PO intake and choledocholithiasis as well as hematuria.    Hospital course:    Cachexia.   - Lack of PO intake (food or drink) at home x 1 month  - DDx includes progression of dementia vs secondary to abdominal pain from choledocholithiasis.   - Nutrition consulted: Liberalized diet to encourage PO intake  - Family declined NGT   - Family would like to place patient in a long term facility  - Discussed with family, CT a/p cancelled, as patient is comfortable and they would like to hold off any kind of aggressive intervention if anything is found.    Hypernatremia.   - S/p D5  - Resolved     Choledocholithiasis.   - Previously noted, but no intervention performed due to lack of symptoms. T Bili slightly higher, however alk phos lower than prior  - Discussed with family, CT a/p cancelled, as patient is comfortable and they would like to hold off any kind of aggressive intervention if anything is found.    Dementia.   - A&Ox1 at baseline, may have progressed to end-stage with no PO intake.  - Patient is DNR/DNI, MOLST in chart   - Low dose Risperidone    Chronic atrial fibrillation.   - On Eliquis 5mg BID.   - Continued carvedilol 3.125mg BID    Hematuria.   - RBCs on UA, family endorsing dark urine with large clots.  - Hgb currently stable  - Continued with tamsulosin .4mg QHS.    Deep tissue injury.   - R sacral ulcer, per RN is stage 3  - Ascorbic acid  - Wound care RN: topical recommendations implemented   - Nutrition consulted: ensure plus protein 2x/day    On 12/20/2022 this case was reviewed with Dr. Braden, the patient is medically stable and optimized for discharge.  Dispo: home w/ home PT

## 2022-12-12 NOTE — DISCHARGE NOTE PROVIDER - NSDCMRMEDTOKEN_GEN_ALL_CORE_FT
apixaban 5 mg oral tablet: 1 tab(s) orally 2 times a day  Calcium 600+D oral tablet: 1 tab(s) orally once a day  carvedilol 3.125 mg oral tablet: 1 tab(s) orally every 12 hours  docusate sodium 100 mg oral capsule: 2 cap(s) orally once a day (at bedtime)  Melatonin 10 mg oral capsule: 1 cap(s) orally once a day (at bedtime), As Needed  Multiple Vitamins oral tablet: 1 tab(s) orally once a day  risperiDONE 0.5 mg oral tablet: 1 tab(s) orally once a day (at bedtime)  senna oral tablet: 2 tab(s) orally once a day (at bedtime)  tamsulosin 0.4 mg oral capsule: 1 cap(s) orally once a day (at bedtime)  Vitamin D3 25 mcg (1000 intl units) oral tablet: 1 tab(s) orally once a day   acetaminophen 325 mg oral tablet: 2 tab(s) orally every 6 hours, As needed, Temp greater or equal to 38C (100.4F), Mild Pain (1 - 3)  apixaban 5 mg oral tablet: 1 tab(s) orally 2 times a day  ascorbic acid 500 mg oral tablet: 1 tab(s) orally once a day  Calcium 600+D oral tablet: 1 tab(s) orally once a day  carvedilol 3.125 mg oral tablet: 1 tab(s) orally every 12 hours  docusate sodium 100 mg oral capsule: 2 cap(s) orally once a day (at bedtime)  Melatonin 10 mg oral capsule: 1 cap(s) orally once a day (at bedtime), As Needed  Multiple Vitamins oral tablet: 1 tab(s) orally once a day  risperiDONE 0.5 mg oral tablet: 1 tab(s) orally once a day (at bedtime)  senna oral tablet: 2 tab(s) orally once a day (at bedtime)  tamsulosin 0.4 mg oral capsule: 1 cap(s) orally once a day (at bedtime)  Vitamin D3 25 mcg (1000 intl units) oral tablet: 1 tab(s) orally once a day

## 2022-12-12 NOTE — DISCHARGE NOTE PROVIDER - NSDCFUADDINST_GEN_ALL_CORE_FT
Wound care instructions:   -->Left sacrum: cleanse with NS. Pat dry. Apply Liquid barrier film to periwound skin including purplr-maroon discoloration to right sacrum, allow to dry. Apply medihoney gel to wound base, cover with silicone foam with border. Change daily. Monitor for tissue type changes.  --> Continue to offload pressure, Continue w/ low air loss fluidized bed surface, continue turning and positioning w/ offloading assistive devices as per protocol, continue perineal care per protocol, continue use of single breathable incontinence pad. Consider penile pouch or condom catheter to contain urine.  -->Waffle Cushion to chair when oob to chair  -->left great toe- paint with betadine daily.   -->offload heels with complete cair boots.     Wound care instructions:   -->Left sacrum: cleanse with NS. Pat dry. Apply Liquid barrier film to periwound skin including purplr-maroon discoloration to right sacrum, allow to dry. Apply medihoney gel to wound base, cover with silicone foam with border. Change daily. Monitor for tissue type changes.  --> Continue to offload pressure, Continue w/ low air loss fluidized bed surface, continue turning and positioning w/ offloading assistive devices as per protocol, continue perineal care per protocol, continue use of single breathable incontinence pad. Consider penile pouch or condom catheter to contain urine.  -->Waffle Cushion to chair when oob to chair  -->left great toe- paint with betadine daily.   -->offload heels with complete cair boots.  Left sacrum unstageable   Topical recommendations: cleanse with NS. Pat dry. Apply Liquid barrier film to periwound skin. Apply medihoney gel to wound base, over with silicone foam with border. Monitor for tissue type changes.

## 2022-12-12 NOTE — DISCHARGE NOTE PROVIDER - NSDCCPCAREPLAN_GEN_ALL_CORE_FT
PRINCIPAL DISCHARGE DIAGNOSIS  Diagnosis: Adult failure to thrive  Assessment and Plan of Treatment: You presented with failure to thrive. You were given IV fluids. You were seen by nutrition and recommended for Ensure High Protein two times per day.      SECONDARY DISCHARGE DIAGNOSES  Diagnosis: Hypernatremia  Assessment and Plan of Treatment: You presented with elevated sodium secondary to decreased oral intake. You were treate with IV fluids. Your sodium normalized prior to discharge.    Diagnosis: Deep tissue injury  Assessment and Plan of Treatment: You were evaluated by wound care team; continue with topical reccommendations as prescribed.    Diagnosis: Dementia  Assessment and Plan of Treatment: Supportive care. Discharge to rehab with transition to long term care.    Diagnosis: Chronic atrial fibrillation  Assessment and Plan of Treatment: Continue with eliquis and coreg.     PRINCIPAL DISCHARGE DIAGNOSIS  Diagnosis: Adult failure to thrive  Assessment and Plan of Treatment: You presented with failure to thrive. You were given IV fluids. You were seen by nutrition and recommended for Ensure High Protein two times per day.      SECONDARY DISCHARGE DIAGNOSES  Diagnosis: Hypernatremia  Assessment and Plan of Treatment: You presented with elevated sodium secondary to decreased oral intake. You were treated with IV fluids. Your sodium normalized prior to discharge.    Diagnosis: Dementia  Assessment and Plan of Treatment: Supportive care. Discharge to rehab with transition to long term care.    Diagnosis: Chronic atrial fibrillation  Assessment and Plan of Treatment: Continue with eliquis and coreg.    Diagnosis: Deep tissue injury  Assessment and Plan of Treatment: You were evaluated by wound care team; continue with topical reccommendations as prescribed.     PRINCIPAL DISCHARGE DIAGNOSIS  Diagnosis: Adult failure to thrive  Assessment and Plan of Treatment: You presented with failure to thrive. You were given IV fluids. You were seen by nutrition and recommended for Ensure High Protein two times per day.      SECONDARY DISCHARGE DIAGNOSES  Diagnosis: Hypernatremia  Assessment and Plan of Treatment: You presented with elevated sodium secondary to decreased oral intake. You were treated with IV fluids. Your sodium normalized prior to discharge.    Diagnosis: Dementia  Assessment and Plan of Treatment: Supportive care. Discharge home with home care.    Diagnosis: Chronic atrial fibrillation  Assessment and Plan of Treatment: Please continue your medications as directed and follow-up with your primary provider/cardiologist to further manage your care. Monitor for signs/symptoms of uncontrolled atrial fibrillation, such as, increased heart rate, palpitations, chest pain, dizziness, or shortness of breath - Return to emergency room if these signs/symptoms are present.    Diagnosis: Deep tissue injury  Assessment and Plan of Treatment: You were evaluated by wound care team; continue with topical reccommendations as prescribed.

## 2022-12-12 NOTE — DISCHARGE NOTE PROVIDER - CARE PROVIDER_API CALL
Maritza Holcomb (DO)  Family Medicine  [address], [address]  [place] [address]  Phone: [phone]  Fax: ()-  Follow Up Time:

## 2022-12-12 NOTE — DISCHARGE NOTE PROVIDER - NSFOLLOWUPCLINICS_GEN_ALL_ED_FT
[facility]  Podiatry  [address] [place] [address] - [address] [place] [address]  Phone: [phone]  Fax:

## 2022-12-13 LAB — SARS-COV-2 RNA SPEC QL NAA+PROBE: SIGNIFICANT CHANGE UP

## 2022-12-13 PROCEDURE — 99232 SBSQ HOSP IP/OBS MODERATE 35: CPT

## 2022-12-13 RX ADMIN — SENNA PLUS 2 TABLET(S): 8.6 TABLET ORAL at 22:41

## 2022-12-13 RX ADMIN — Medication 1 TABLET(S): at 11:42

## 2022-12-13 RX ADMIN — Medication 1000 UNIT(S): at 11:42

## 2022-12-13 RX ADMIN — Medication 500 MILLIGRAM(S): at 11:42

## 2022-12-13 RX ADMIN — RISPERIDONE 0.5 MILLIGRAM(S): 4 TABLET ORAL at 22:41

## 2022-12-13 RX ADMIN — CARVEDILOL PHOSPHATE 3.12 MILLIGRAM(S): 80 CAPSULE, EXTENDED RELEASE ORAL at 11:41

## 2022-12-13 RX ADMIN — Medication 6 MILLIGRAM(S): at 22:41

## 2022-12-13 RX ADMIN — APIXABAN 5 MILLIGRAM(S): 2.5 TABLET, FILM COATED ORAL at 12:44

## 2022-12-13 RX ADMIN — CARVEDILOL PHOSPHATE 3.12 MILLIGRAM(S): 80 CAPSULE, EXTENDED RELEASE ORAL at 22:41

## 2022-12-13 RX ADMIN — CHLORHEXIDINE GLUCONATE 1 APPLICATION(S): 213 SOLUTION TOPICAL at 12:01

## 2022-12-13 RX ADMIN — APIXABAN 5 MILLIGRAM(S): 2.5 TABLET, FILM COATED ORAL at 22:52

## 2022-12-13 RX ADMIN — TAMSULOSIN HYDROCHLORIDE 0.4 MILLIGRAM(S): 0.4 CAPSULE ORAL at 22:41

## 2022-12-14 PROCEDURE — 99232 SBSQ HOSP IP/OBS MODERATE 35: CPT

## 2022-12-14 RX ADMIN — APIXABAN 5 MILLIGRAM(S): 2.5 TABLET, FILM COATED ORAL at 17:30

## 2022-12-14 RX ADMIN — TAMSULOSIN HYDROCHLORIDE 0.4 MILLIGRAM(S): 0.4 CAPSULE ORAL at 22:35

## 2022-12-14 RX ADMIN — Medication 6 MILLIGRAM(S): at 22:37

## 2022-12-14 RX ADMIN — RISPERIDONE 0.5 MILLIGRAM(S): 4 TABLET ORAL at 22:34

## 2022-12-14 RX ADMIN — Medication 1 TABLET(S): at 12:04

## 2022-12-14 RX ADMIN — Medication 500 MILLIGRAM(S): at 12:03

## 2022-12-14 RX ADMIN — CARVEDILOL PHOSPHATE 3.12 MILLIGRAM(S): 80 CAPSULE, EXTENDED RELEASE ORAL at 09:19

## 2022-12-14 RX ADMIN — APIXABAN 5 MILLIGRAM(S): 2.5 TABLET, FILM COATED ORAL at 06:58

## 2022-12-14 RX ADMIN — CHLORHEXIDINE GLUCONATE 1 APPLICATION(S): 213 SOLUTION TOPICAL at 12:04

## 2022-12-14 RX ADMIN — Medication 1000 UNIT(S): at 12:04

## 2022-12-14 RX ADMIN — SENNA PLUS 2 TABLET(S): 8.6 TABLET ORAL at 22:34

## 2022-12-15 PROCEDURE — 99232 SBSQ HOSP IP/OBS MODERATE 35: CPT

## 2022-12-15 PROCEDURE — 99232 SBSQ HOSP IP/OBS MODERATE 35: CPT | Mod: FS

## 2022-12-15 RX ADMIN — Medication 1 TABLET(S): at 11:37

## 2022-12-15 RX ADMIN — CHLORHEXIDINE GLUCONATE 1 APPLICATION(S): 213 SOLUTION TOPICAL at 11:35

## 2022-12-15 RX ADMIN — Medication 6 MILLIGRAM(S): at 21:20

## 2022-12-15 RX ADMIN — APIXABAN 5 MILLIGRAM(S): 2.5 TABLET, FILM COATED ORAL at 07:20

## 2022-12-15 RX ADMIN — RISPERIDONE 0.5 MILLIGRAM(S): 4 TABLET ORAL at 21:20

## 2022-12-15 RX ADMIN — TAMSULOSIN HYDROCHLORIDE 0.4 MILLIGRAM(S): 0.4 CAPSULE ORAL at 21:26

## 2022-12-15 RX ADMIN — SENNA PLUS 2 TABLET(S): 8.6 TABLET ORAL at 21:20

## 2022-12-15 RX ADMIN — CARVEDILOL PHOSPHATE 3.12 MILLIGRAM(S): 80 CAPSULE, EXTENDED RELEASE ORAL at 18:34

## 2022-12-15 RX ADMIN — Medication 500 MILLIGRAM(S): at 11:36

## 2022-12-15 RX ADMIN — CARVEDILOL PHOSPHATE 3.12 MILLIGRAM(S): 80 CAPSULE, EXTENDED RELEASE ORAL at 07:21

## 2022-12-15 RX ADMIN — APIXABAN 5 MILLIGRAM(S): 2.5 TABLET, FILM COATED ORAL at 18:33

## 2022-12-15 RX ADMIN — Medication 1000 UNIT(S): at 13:03

## 2022-12-16 LAB — SARS-COV-2 RNA SPEC QL NAA+PROBE: SIGNIFICANT CHANGE UP

## 2022-12-16 PROCEDURE — 99232 SBSQ HOSP IP/OBS MODERATE 35: CPT

## 2022-12-16 RX ADMIN — APIXABAN 5 MILLIGRAM(S): 2.5 TABLET, FILM COATED ORAL at 17:15

## 2022-12-16 RX ADMIN — CARVEDILOL PHOSPHATE 3.12 MILLIGRAM(S): 80 CAPSULE, EXTENDED RELEASE ORAL at 17:16

## 2022-12-16 RX ADMIN — Medication 1000 UNIT(S): at 11:33

## 2022-12-16 RX ADMIN — CARVEDILOL PHOSPHATE 3.12 MILLIGRAM(S): 80 CAPSULE, EXTENDED RELEASE ORAL at 05:43

## 2022-12-16 RX ADMIN — Medication 1 TABLET(S): at 11:33

## 2022-12-16 RX ADMIN — SENNA PLUS 2 TABLET(S): 8.6 TABLET ORAL at 21:33

## 2022-12-16 RX ADMIN — RISPERIDONE 0.5 MILLIGRAM(S): 4 TABLET ORAL at 21:32

## 2022-12-16 RX ADMIN — Medication 6 MILLIGRAM(S): at 21:33

## 2022-12-16 RX ADMIN — APIXABAN 5 MILLIGRAM(S): 2.5 TABLET, FILM COATED ORAL at 05:41

## 2022-12-16 RX ADMIN — CHLORHEXIDINE GLUCONATE 1 APPLICATION(S): 213 SOLUTION TOPICAL at 11:34

## 2022-12-16 RX ADMIN — Medication 500 MILLIGRAM(S): at 11:33

## 2022-12-17 PROCEDURE — 99232 SBSQ HOSP IP/OBS MODERATE 35: CPT

## 2022-12-17 RX ADMIN — Medication 6 MILLIGRAM(S): at 21:55

## 2022-12-17 RX ADMIN — TAMSULOSIN HYDROCHLORIDE 0.4 MILLIGRAM(S): 0.4 CAPSULE ORAL at 21:56

## 2022-12-17 RX ADMIN — SENNA PLUS 2 TABLET(S): 8.6 TABLET ORAL at 21:55

## 2022-12-17 RX ADMIN — RISPERIDONE 0.5 MILLIGRAM(S): 4 TABLET ORAL at 21:54

## 2022-12-17 RX ADMIN — APIXABAN 5 MILLIGRAM(S): 2.5 TABLET, FILM COATED ORAL at 17:18

## 2022-12-17 RX ADMIN — CARVEDILOL PHOSPHATE 3.12 MILLIGRAM(S): 80 CAPSULE, EXTENDED RELEASE ORAL at 17:26

## 2022-12-17 RX ADMIN — CARVEDILOL PHOSPHATE 3.12 MILLIGRAM(S): 80 CAPSULE, EXTENDED RELEASE ORAL at 05:42

## 2022-12-17 RX ADMIN — Medication 1000 UNIT(S): at 12:08

## 2022-12-17 RX ADMIN — Medication 500 MILLIGRAM(S): at 12:08

## 2022-12-17 RX ADMIN — Medication 1 TABLET(S): at 12:08

## 2022-12-17 RX ADMIN — Medication 1 TABLET(S): at 12:09

## 2022-12-17 RX ADMIN — APIXABAN 5 MILLIGRAM(S): 2.5 TABLET, FILM COATED ORAL at 05:42

## 2022-12-17 RX ADMIN — CHLORHEXIDINE GLUCONATE 1 APPLICATION(S): 213 SOLUTION TOPICAL at 12:09

## 2022-12-18 LAB — SARS-COV-2 RNA SPEC QL NAA+PROBE: SIGNIFICANT CHANGE UP

## 2022-12-18 PROCEDURE — 99233 SBSQ HOSP IP/OBS HIGH 50: CPT

## 2022-12-18 RX ADMIN — CARVEDILOL PHOSPHATE 3.12 MILLIGRAM(S): 80 CAPSULE, EXTENDED RELEASE ORAL at 18:37

## 2022-12-18 RX ADMIN — CHLORHEXIDINE GLUCONATE 1 APPLICATION(S): 213 SOLUTION TOPICAL at 11:41

## 2022-12-18 RX ADMIN — Medication 500 MILLIGRAM(S): at 11:39

## 2022-12-18 RX ADMIN — RISPERIDONE 0.5 MILLIGRAM(S): 4 TABLET ORAL at 22:45

## 2022-12-18 RX ADMIN — Medication 1 TABLET(S): at 11:40

## 2022-12-18 RX ADMIN — Medication 1000 UNIT(S): at 11:40

## 2022-12-18 RX ADMIN — Medication 6 MILLIGRAM(S): at 22:45

## 2022-12-18 RX ADMIN — APIXABAN 5 MILLIGRAM(S): 2.5 TABLET, FILM COATED ORAL at 18:36

## 2022-12-18 RX ADMIN — SENNA PLUS 2 TABLET(S): 8.6 TABLET ORAL at 22:45

## 2022-12-18 RX ADMIN — TAMSULOSIN HYDROCHLORIDE 0.4 MILLIGRAM(S): 0.4 CAPSULE ORAL at 22:45

## 2022-12-18 RX ADMIN — CARVEDILOL PHOSPHATE 3.12 MILLIGRAM(S): 80 CAPSULE, EXTENDED RELEASE ORAL at 07:15

## 2022-12-18 RX ADMIN — APIXABAN 5 MILLIGRAM(S): 2.5 TABLET, FILM COATED ORAL at 07:15

## 2022-12-19 ENCOUNTER — TRANSCRIPTION ENCOUNTER (OUTPATIENT)
Age: 87
End: 2022-12-19

## 2022-12-19 LAB
BASOPHILS # BLD AUTO: 0.01 K/UL — SIGNIFICANT CHANGE UP (ref 0–0.2)
BASOPHILS NFR BLD AUTO: 0.2 % — SIGNIFICANT CHANGE UP (ref 0–2)
EOSINOPHIL # BLD AUTO: 0.07 K/UL — SIGNIFICANT CHANGE UP (ref 0–0.5)
EOSINOPHIL NFR BLD AUTO: 1.1 % — SIGNIFICANT CHANGE UP (ref 0–6)
HCT VFR BLD CALC: 41.5 % — SIGNIFICANT CHANGE UP (ref 39–50)
HGB BLD-MCNC: 13.2 G/DL — SIGNIFICANT CHANGE UP (ref 13–17)
IANC: 3.09 K/UL — SIGNIFICANT CHANGE UP (ref 1.8–7.4)
IMM GRANULOCYTES NFR BLD AUTO: 0.5 % — SIGNIFICANT CHANGE UP (ref 0–0.9)
LYMPHOCYTES # BLD AUTO: 2.67 K/UL — SIGNIFICANT CHANGE UP (ref 1–3.3)
LYMPHOCYTES # BLD AUTO: 41.8 % — SIGNIFICANT CHANGE UP (ref 13–44)
MAGNESIUM SERPL-MCNC: 2.3 MG/DL — SIGNIFICANT CHANGE UP (ref 1.6–2.6)
MCHC RBC-ENTMCNC: 26.9 PG — LOW (ref 27–34)
MCHC RBC-ENTMCNC: 31.8 GM/DL — LOW (ref 32–36)
MCV RBC AUTO: 84.5 FL — SIGNIFICANT CHANGE UP (ref 80–100)
MONOCYTES # BLD AUTO: 0.52 K/UL — SIGNIFICANT CHANGE UP (ref 0–0.9)
MONOCYTES NFR BLD AUTO: 8.1 % — SIGNIFICANT CHANGE UP (ref 2–14)
NEUTROPHILS # BLD AUTO: 3.09 K/UL — SIGNIFICANT CHANGE UP (ref 1.8–7.4)
NEUTROPHILS NFR BLD AUTO: 48.3 % — SIGNIFICANT CHANGE UP (ref 43–77)
NRBC # BLD: 0 /100 WBCS — SIGNIFICANT CHANGE UP (ref 0–0)
NRBC # FLD: 0 K/UL — SIGNIFICANT CHANGE UP (ref 0–0)
PHOSPHATE SERPL-MCNC: 2.7 MG/DL — SIGNIFICANT CHANGE UP (ref 2.5–4.5)
PLATELET # BLD AUTO: 286 K/UL — SIGNIFICANT CHANGE UP (ref 150–400)
RBC # BLD: 4.91 M/UL — SIGNIFICANT CHANGE UP (ref 4.2–5.8)
RBC # FLD: 18.6 % — HIGH (ref 10.3–14.5)
WBC # BLD: 6.39 K/UL — SIGNIFICANT CHANGE UP (ref 3.8–10.5)
WBC # FLD AUTO: 6.39 K/UL — SIGNIFICANT CHANGE UP (ref 3.8–10.5)

## 2022-12-19 PROCEDURE — 99233 SBSQ HOSP IP/OBS HIGH 50: CPT

## 2022-12-19 RX ORDER — ASCORBIC ACID 60 MG
1 TABLET,CHEWABLE ORAL
Qty: 0 | Refills: 0 | DISCHARGE
Start: 2022-12-19

## 2022-12-19 RX ORDER — ACETAMINOPHEN 500 MG
2 TABLET ORAL
Qty: 0 | Refills: 0 | DISCHARGE
Start: 2022-12-19

## 2022-12-19 RX ADMIN — SENNA PLUS 2 TABLET(S): 8.6 TABLET ORAL at 22:19

## 2022-12-19 RX ADMIN — RISPERIDONE 0.5 MILLIGRAM(S): 4 TABLET ORAL at 22:20

## 2022-12-19 RX ADMIN — Medication 1000 UNIT(S): at 12:36

## 2022-12-19 RX ADMIN — Medication 1 TABLET(S): at 12:37

## 2022-12-19 RX ADMIN — TAMSULOSIN HYDROCHLORIDE 0.4 MILLIGRAM(S): 0.4 CAPSULE ORAL at 22:19

## 2022-12-19 RX ADMIN — Medication 500 MILLIGRAM(S): at 12:36

## 2022-12-19 RX ADMIN — CARVEDILOL PHOSPHATE 3.12 MILLIGRAM(S): 80 CAPSULE, EXTENDED RELEASE ORAL at 18:10

## 2022-12-19 RX ADMIN — Medication 6 MILLIGRAM(S): at 22:20

## 2022-12-19 RX ADMIN — APIXABAN 5 MILLIGRAM(S): 2.5 TABLET, FILM COATED ORAL at 07:19

## 2022-12-19 RX ADMIN — CHLORHEXIDINE GLUCONATE 1 APPLICATION(S): 213 SOLUTION TOPICAL at 12:36

## 2022-12-19 RX ADMIN — APIXABAN 5 MILLIGRAM(S): 2.5 TABLET, FILM COATED ORAL at 18:12

## 2022-12-19 RX ADMIN — Medication 1 TABLET(S): at 12:36

## 2022-12-19 RX ADMIN — CARVEDILOL PHOSPHATE 3.12 MILLIGRAM(S): 80 CAPSULE, EXTENDED RELEASE ORAL at 07:19

## 2022-12-19 NOTE — DISCHARGE NOTE NURSING/CASE MANAGEMENT/SOCIAL WORK - NSDCPEFALRISK_GEN_ALL_CORE
For information on Fall & Injury Prevention, visit: [email or web address] OR  [email or web address] OR  https://www.cdc.gov/steadi/patient.html

## 2022-12-19 NOTE — DISCHARGE NOTE NURSING/CASE MANAGEMENT/SOCIAL WORK - PATIENT PORTAL LINK FT
You can access the FollowMyHealth Patient Portal offered by [facility] by registering at the following website: [email or web address]. By joining [employer]’s FollowMyHealth portal, you will also be able to view your health information using other applications (apps) compatible with our system.

## 2022-12-19 NOTE — DISCHARGE NOTE NURSING/CASE MANAGEMENT/SOCIAL WORK - NSDCFUADDAPPT_GEN_ALL_CORE_FT
Upon discharge f/u as outpatient at [facility] [place] [address] [phone]

## 2022-12-19 NOTE — DISCHARGE NOTE NURSING/CASE MANAGEMENT/SOCIAL WORK - NSDCVIVACCINE_GEN_ALL_CORE_FT
COVID-19, mRNA, LNP-S, PF, 30 mcg/0.3 mL dose (Pfizer); 27-Oct-2021 17:40; Julienne Gray (RN); [employer]; [id number] (Exp. Date: 11-Nov-2021); IntraMuscular; Deltoid Left.; 0.3 milliLiter(s);   Influenza, seasonal, injectable; 05-Jan-2014 10:59; Jose Juan Medina (CHARLENE); [id number]; IntraMuscular; Deltoid Left.; 0.5 milliLiter(s);   influenza, injectable, quadrivalent, preservative free; 10-Dec-2014 16:19; Crystal Treadwell (CHARLENE); Sanof[employer]; ; IntraMuscular; Deltoid Right.; 0.5 milliLiter(s);   influenza, injectable, quadrivalent, preservative free; 04-Oct-2019 13:43; Pollo De La Vega (RN); [employer];  (Exp. Date: 30-Jun-2020); IntraMuscular; Deltoid Left.; 0.5 milliLiter(s); VIS (VIS Published: 15-Aug-2019, VIS Presented: 04-Oct-2019);   pneumococcal polysaccharide PPV23; 05-Jan-2014 10:59; Jose Juan Medina (CHARLENE); [id number]; IntraMuscular; Deltoid Right.; 0.5 milliLiter(s);

## 2022-12-20 VITALS
TEMPERATURE: 98 F | HEART RATE: 92 BPM | OXYGEN SATURATION: 98 % | SYSTOLIC BLOOD PRESSURE: 112 MMHG | DIASTOLIC BLOOD PRESSURE: 72 MMHG | RESPIRATION RATE: 18 BRPM

## 2022-12-20 PROCEDURE — 99239 HOSP IP/OBS DSCHRG MGMT >30: CPT

## 2022-12-20 RX ADMIN — APIXABAN 5 MILLIGRAM(S): 2.5 TABLET, FILM COATED ORAL at 05:35

## 2022-12-20 RX ADMIN — Medication 1 TABLET(S): at 12:24

## 2022-12-20 RX ADMIN — Medication 1000 UNIT(S): at 12:24

## 2022-12-20 RX ADMIN — CARVEDILOL PHOSPHATE 3.12 MILLIGRAM(S): 80 CAPSULE, EXTENDED RELEASE ORAL at 05:35

## 2022-12-20 RX ADMIN — Medication 500 MILLIGRAM(S): at 12:24

## 2022-12-20 RX ADMIN — CHLORHEXIDINE GLUCONATE 1 APPLICATION(S): 213 SOLUTION TOPICAL at 12:24

## 2022-12-20 NOTE — PROGRESS NOTE ADULT - PROBLEM SELECTOR PROBLEM 3
Choledocholithiasis

## 2022-12-20 NOTE — PROGRESS NOTE ADULT - PROBLEM SELECTOR PROBLEM 1
Cachexia

## 2022-12-20 NOTE — PROGRESS NOTE ADULT - PROBLEM SELECTOR PLAN 7
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

## 2022-12-20 NOTE — PROGRESS NOTE ADULT - PROBLEM SELECTOR PLAN 5
On Eliquis 5mg BID.   - Check weight, may need dose reduction to 2.5 mg BID if weight under 60kg (Age > 80)  - C/w carvedilol 3.125mg BID
On Eliquis 5mg BID.   - Check weight, may need dose reduction to 2.5 mg BID if weight under 60kg (Age > 80)  - C/w carvedilol 3.125mg BID
On Eliquis 5mg BID.   - Check weight, may need dose reduction to 2.5 mg BID if weight under 60kg (Age > 80)  - C/w carvedilol 3.125mg BID    May need to further adjust if hematuria is getting worse, thus far, will continue to monitor
On Eliquis 5mg BID.   - Check weight, may need dose reduction to 2.5 mg BID if weight under 60kg (Age > 80)  - C/w carvedilol 3.125mg BID
On Eliquis 5mg BID.   - Check weight, may need dose reduction to 2.5 mg BID if weight under 60kg (Age > 80)  - C/w carvedilol 3.125mg BID    May need to further adjust if hematuria is getting worse, thus far today is stable.
On Eliquis 5mg BID.   - Check weight, may need dose reduction to 2.5 mg BID if weight under 60kg (Age > 80)  - C/w carvedilol 3.125mg BID    May need to further adjust if hematuria is getting worse, thus far, will continue to monitor
On Eliquis 5mg BID.   - Check weight, may need dose reduction to 2.5 mg BID if weight under 60kg (Age > 80)  - C/w carvedilol 3.125mg BID
On Eliquis 5mg BID.   - Check weight, may need dose reduction to 2.5 mg BID if weight under 60kg (Age > 80)  - C/w carvedilol 3.125mg BID    May need to further adjust if hematuria is getting worse, thus far, will continue to monitor

## 2022-12-20 NOTE — PROGRESS NOTE ADULT - PROVIDER SPECIALTY LIST ADULT
Wound Care
Hospitalist

## 2022-12-20 NOTE — PROGRESS NOTE ADULT - PROBLEM SELECTOR PROBLEM 7
Deep tissue injury

## 2022-12-20 NOTE — PROGRESS NOTE ADULT - NUTRITIONAL ASSESSMENT
This patient has been assessed with a concern for Malnutrition and has been determined to have a diagnosis/diagnoses of Severe protein-calorie malnutrition.    This patient is being managed with:   Diet Pureed-  Supplement Feeding Modality:  Oral  Ensure Plus High Protein Cans or Servings Per Day:  1       Frequency:  Two Times a day  Entered: Dec  8 2022  5:17PM    

## 2022-12-20 NOTE — PROGRESS NOTE ADULT - PROBLEM SELECTOR PROBLEM 5
Chronic atrial fibrillation

## 2022-12-20 NOTE — PROGRESS NOTE ADULT - PROBLEM SELECTOR PLAN 8
DVT: Eliquis  Diet: Full to encourage PO intake/nutrition consult  Dispo: Family requesting nursing home. PT consult  GOC: DNR/DNI
DVT: Eliquis  Diet: Full to encourage PO intake/nutrition consult  Dispo: Pending LTC placement   GOC: DNR/DNI
DVT: Eliquis  Diet: Full to encourage PO intake/nutrition consult  Dispo: Pending LTC placement   GOC: DNR/DNI
DVT: Eliquis  Diet: Full to encourage PO intake/nutrition consult  Dispo: Family requesting nursing home  GOC: DNR/DNI
DVT: Eliquis  Diet: Full to encourage PO intake/nutrition consult  Dispo: Pending LTC placement   GOC: DNR/DNI
DVT: Eliquis  Diet: Full to encourage PO intake/nutrition consult  Dispo: Pending LTC placement   GOC: DNR/DNI
DVT: Eliquis  Diet: Full to encourage PO intake/nutrition consult  Dispo: Family requesting nursing home. PT consult  GOC: DNR/DNI    f/u today's labs  f/u ct a/p
DVT: Eliquis  Diet: Full to encourage PO intake/nutrition consult  Dispo: Family requesting nursing home  GOC: DNR/DNI
DVT: Eliquis  Diet: Full to encourage PO intake/nutrition consult  Dispo: Family requesting nursing home. PT consult  GOC: DNR/DNI
DVT: Eliquis  Diet: Full to encourage PO intake/nutrition consult  Dispo: Family requesting nursing home. PT consult  GOC: DNR/DNI
DVT: Eliquis  Diet: Full to encourage PO intake/nutrition consult  Dispo: Family requesting nursing home. PT consult  GOC: DNR/DNI    f/u today's labs
DVT: Eliquis  Diet: Full to encourage PO intake/nutrition consult  Dispo: Family requesting nursing home  GOC: DNR/DNI
DVT: Eliquis  Diet: Full to encourage PO intake/nutrition consult  Dispo: Family requesting nursing home  GOC: DNR/DNI

## 2022-12-20 NOTE — PROGRESS NOTE ADULT - REASON FOR ADMISSION
Failure to Thrive

## 2022-12-20 NOTE — PROGRESS NOTE ADULT - ASSESSMENT
89 year old man with PMHx afib on Eliquis, PVD d/t prior DVTs, dementia (A&Ox1 at baseline), HTN, kidney stones, choledocholithiasis, bed sore presents to ED due to failure to thrive and abdominal pain. Labs consistent with poor PO intake and choledocholithiasis as well as hematuria.
Assessment/Plan:89 year old man with PMHx afib on Eliquis, PVD d/t prior DVTs, dementia (A&Ox1 at baseline), HTN, kidney stones, choledocholithiasis, bed sore presents to ED due to failure to thrive and abdominal pain. Labs consistent with poor PO intake and choledocholithiasis as well as hematuria. As per notes regarding GOC discussion family does not want aggressive measures.     Wound Consult requested to assist w/ management of Left sacrum unstageable pressure injury and left distal great toe deep tissue pressure injury.    Left sacrum unstageable pressure injury  - Linear like pressure injury localized to left sacrum with area of purple maroon discoloration in periwound skin overlying right sacrum, without palpable skin changes.  - Fixed minimally moist tan-yellow eschar.  - No induration, no fluctuance, no crepitus.  - Scant serous drainage, no odor.  - No associated cellulitis.  - No sharp debridement indicated.  - Topical recommendations: cleanse with NS. Pat dry. Apply Liquid barrier film to periwound skin. Apply medihoney gel to wound base, over with silicone foam with border. Monitor for tissue type changes.  - Continue to offload pressure  - Continue w/ low air loss fluidized bed surface   - Continue turning and positioning w/ offloading assistive devices as per protocol  - Continue perineal care per protocol, continue use of single breathable incontinence pad.  - Waffle Cushion to chair when oob to chair  - Consider penile pouch or condom catheter to contain urine.    Left distal great toe deep tissue pressure injury  - B/L LE with chronic venous changes, (+) hemosiderin staining  - stable purple-maroon discoloration.  - non tender, no drainage, no palpable skin changes.  - Paint with betadine daily.  - Follow up with podiatry outpatient.  - Continue to offload pressure with complete cair boots daily.    -Appreciate Nutrition Consult for optimization; MVI & Vit C on board to promote wound healing, encourage high quality protein when appropriate  -Encourage/Assist PO intake      Continue low airloss support surface.  Apply Sween 24 moisturizer to upper and lower extremities, avoid between toes. Daily.   Continue to turn and position every 2 hours with z-angelique fluidized positioning device.  Continue use of Complete Cair pressure offloading boots.  Continue Nutritional management as per RD recommendations.    Upon discharge follow up at outpatient [facility]. [address]. [phone].    Will continue to follow while inpatient. Please reconsult earlier if needed it.   Thank you.    Discussed findings and plan with primary team.  Tamara Falcon, IVONE-BC, CWOC    pager #[phone] or available in MS teams     If after 4PM or before 7:30AM on Mon-Friday or weekend/holiday please contact general surgery for urgent matters.   Team A- [id number]   Team B- [id number]  For non-urgent matters e-mail [email or web address]    We spent 25 minutes face-to-face with this patient of which more than 50% of the time was spent counseling/coordinating care of this patient.      
89 year old man with PMHx afib on Eliquis, PVD d/t prior DVTs, dementia (A&Ox1 at baseline), HTN, kidney stones, choledocholithiasis, bed sore presents to ED due to failure to thrive and abdominal pain. Labs consistent with poor PO intake and choledocholithiasis as well as hematuria.

## 2022-12-20 NOTE — PROGRESS NOTE ADULT - SUBJECTIVE AND OBJECTIVE BOX
Patient is a 89y old  Male who presents with a chief complaint of Failure to Thrive (17 Dec 2022 12:53)      SUBJECTIVE / OVERNIGHT EVENTS: No acute events overnight. Pt has no new complaints     ADDITIONAL REVIEW OF SYSTEMS:    MEDICATIONS  (STANDING):  apixaban 5 milliGRAM(s) Oral two times a day  ascorbic acid 500 milliGRAM(s) Oral daily  calcium carbonate 1250 mG  + Vitamin D (OsCal 500 + D) 1 Tablet(s) Oral daily  carvedilol 3.125 milliGRAM(s) Oral every 12 hours  chlorhexidine 2% Cloths 1 Application(s) Topical daily  cholecalciferol 1000 Unit(s) Oral daily  dextrose 5%. 1000 milliLiter(s) (75 mL/Hr) IV Continuous <Continuous>  melatonin 6 milliGRAM(s) Oral at bedtime  multivitamin 1 Tablet(s) Oral daily  risperiDONE   Tablet 0.5 milliGRAM(s) Oral at bedtime  senna 2 Tablet(s) Oral at bedtime  tamsulosin 0.4 milliGRAM(s) Oral at bedtime    MEDICATIONS  (PRN):  acetaminophen     Tablet .. 650 milliGRAM(s) Oral every 6 hours PRN Temp greater or equal to 38C (100.4F), Mild Pain (1 - 3)  ondansetron Injectable 4 milliGRAM(s) IV Push every 8 hours PRN Nausea and/or Vomiting      CAPILLARY BLOOD GLUCOSE        I&O's Summary      PHYSICAL EXAM:  Vital Signs Last 24 Hrs  T(C): 36.3 (18 Dec 2022 11:45), Max: 37.1 (18 Dec 2022 07:00)  T(F): 97.3 (18 Dec 2022 11:45), Max: 98.7 (18 Dec 2022 07:00)  HR: 102 (18 Dec 2022 11:45) (99 - 103)  BP: 101/65 (18 Dec 2022 11:45) (101/57 - 114/73)  BP(mean): --  RR: 18 (18 Dec 2022 11:45) (18 - 18)  SpO2: 97% (18 Dec 2022 11:45) (91% - 97%)    Parameters below as of 18 Dec 2022 11:45  Patient On (Oxygen Delivery Method): room air      CONSTITUTIONAL: Elderly man, somewhat cachectic appearing, bitemporal wasting, in no apparent distress   RESPIRATORY: Breathing comfortably; lungs CTA without wheeze/rhonchi/rales  CARDIOVASCULAR: +S1S2,irregular; no lower extremity edema  GASTROINTESTINAL: soft, nontender, non distended  NEUROLOGIC: AA Ox1 to self    LABS:                      RADIOLOGY & ADDITIONAL TESTS:  Results Reviewed:   Imaging Personally Reviewed:  Electrocardiogram Personally Reviewed:    COORDINATION OF CARE:  Care Discussed with Consultants/Other Providers [Y/N]:  Prior or Outpatient Records Reviewed [Y/N]:  
Patient is a 89y old  Male who presents with a chief complaint of Failure to Thrive (19 Dec 2022 12:33)      SUBJECTIVE / OVERNIGHT EVENTS: No acute events overnight. Pt has no new complaints     ADDITIONAL REVIEW OF SYSTEMS:    MEDICATIONS  (STANDING):  apixaban 5 milliGRAM(s) Oral two times a day  ascorbic acid 500 milliGRAM(s) Oral daily  calcium carbonate 1250 mG  + Vitamin D (OsCal 500 + D) 1 Tablet(s) Oral daily  carvedilol 3.125 milliGRAM(s) Oral every 12 hours  chlorhexidine 2% Cloths 1 Application(s) Topical daily  cholecalciferol 1000 Unit(s) Oral daily  dextrose 5%. 1000 milliLiter(s) (75 mL/Hr) IV Continuous <Continuous>  melatonin 6 milliGRAM(s) Oral at bedtime  multivitamin 1 Tablet(s) Oral daily  risperiDONE   Tablet 0.5 milliGRAM(s) Oral at bedtime  senna 2 Tablet(s) Oral at bedtime  tamsulosin 0.4 milliGRAM(s) Oral at bedtime    MEDICATIONS  (PRN):  acetaminophen     Tablet .. 650 milliGRAM(s) Oral every 6 hours PRN Temp greater or equal to 38C (100.4F), Mild Pain (1 - 3)  ondansetron Injectable 4 milliGRAM(s) IV Push every 8 hours PRN Nausea and/or Vomiting      CAPILLARY BLOOD GLUCOSE        I&O's Summary    19 Dec 2022 07:01  -  20 Dec 2022 07:00  --------------------------------------------------------  IN: 100 mL / OUT: 0 mL / NET: 100 mL    20 Dec 2022 07:01  -  20 Dec 2022 13:25  --------------------------------------------------------  IN: 0 mL / OUT: 400 mL / NET: -400 mL        PHYSICAL EXAM:  Vital Signs Last 24 Hrs  T(C): 36.4 (20 Dec 2022 05:47), Max: 36.6 (19 Dec 2022 18:05)  T(F): 97.5 (20 Dec 2022 05:47), Max: 97.9 (19 Dec 2022 18:05)  HR: 82 (20 Dec 2022 05:47) (82 - 90)  BP: 122/77 (20 Dec 2022 05:47) (96/55 - 122/77)  BP(mean): --  RR: 17 (20 Dec 2022 05:47) (17 - 18)  SpO2: 100% (20 Dec 2022 05:47) (96% - 100%)    Parameters below as of 20 Dec 2022 05:47  Patient On (Oxygen Delivery Method): room air      CONSTITUTIONAL: Elderly man, somewhat cachectic appearing, bitemporal wasting, in no apparent distress   RESPIRATORY: Breathing comfortably; lungs CTA without wheeze/rhonchi/rales  CARDIOVASCULAR: +S1S2,irregular; no lower extremity edema  GASTROINTESTINAL: soft, nontender, non distended  NEUROLOGIC: AA Ox1 to self    LABS:                        13.2   6.39  )-----------( 286      ( 19 Dec 2022 08:00 )             41.5     12-19    144  |  112<H>  |  18  ----------------------------<  112<H>  4.5   |  21<L>  |  0.59    Ca    8.9      19 Dec 2022 08:00  Phos  2.7     12-19  Mg     2.30     12-19                  RADIOLOGY & ADDITIONAL TESTS:  Results Reviewed:   Imaging Personally Reviewed:  Electrocardiogram Personally Reviewed:    COORDINATION OF CARE:  Care Discussed with Consultants/Other Providers [Y/N]:  Prior or Outpatient Records Reviewed [Y/N]:  
Health system-- WOUND TEAM -- FOLLOW UP NOTE  --------------------------------------------------------------------------------    subjective: Patient seen and examined at bedside. Patient laying comfortable in bed. Denies pain. Reports he feels well. Reports he is aware of sacral wound, denies associated symptoms. Patient sitting upright. Breakfast set up. Patient noted with poor intake.     Interval HPI/24 hour events:   Dispo Planning, family interested in NH placement.   Chart reviewed including labs and relevant images    Diet:  Diet, Pureed:   Supplement Feeding Modality:  Oral  Ensure Plus High Protein Cans or Servings Per Day:  1       Frequency:  Two Times a day (12-08-22 @ 17:17)    ROS: General/ SKIN/ see HPI  pt unable to offer    ALLERGIES & MEDICATIONS  --------------------------------------------------------------------------------  Allergies    No Known Allergies    Intolerances    STANDING INPATIENT MEDICATIONS    apixaban 5 milliGRAM(s) Oral two times a day  ascorbic acid 500 milliGRAM(s) Oral daily  calcium carbonate 1250 mG  + Vitamin D (OsCal 500 + D) 1 Tablet(s) Oral daily  carvedilol 3.125 milliGRAM(s) Oral every 12 hours  chlorhexidine 2% Cloths 1 Application(s) Topical daily  cholecalciferol 1000 Unit(s) Oral daily  dextrose 5%. 1000 milliLiter(s) IV Continuous <Continuous>  melatonin 6 milliGRAM(s) Oral at bedtime  multivitamin 1 Tablet(s) Oral daily  risperiDONE   Tablet 0.5 milliGRAM(s) Oral at bedtime  senna 2 Tablet(s) Oral at bedtime  tamsulosin 0.4 milliGRAM(s) Oral at bedtime      PRN INPATIENT MEDICATION  acetaminophen     Tablet .. 650 milliGRAM(s) Oral every 6 hours PRN  ondansetron Injectable 4 milliGRAM(s) IV Push every 8 hours PRN        Vital signs:  T(C): 36.7 (12-15-22 @ 13:36), Max: 37.6 (12-14-22 @ 21:05)  HR: 86 (12-15-22 @ 18:52) (82 - 89)  BP: 108/68 (12-15-22 @ 18:52) (96/56 - 108/68)  RR: 18 (12-15-22 @ 05:35) (17 - 18)  SpO2: 97% (12-15-22 @ 13:36) (97% - 99%)    Wt(kg): --    Constitutional: NAD, A&O x 1-2. Smiling.   Cachectic, temporal wasting.   Appears in good hygiene, well groomed. Frail.  (+) low airloss support surface, (+) fluidized positioning devices, (+) complete cair boots  HEENT: NC/AT, non icteric, mucosa moist  Cardiovascular: rate regular  Respiratory: room air, nonlabored, equal chest expansion  Gastrointestinal: soft NT/ND  : urinary incontinence, perineal care provided. Single breathable incontinence pad replaced.  Neurology: strength & sensation grossly intact, follows commands  Musculoskeletal: Limited aROM, able to lift right arm, left arm weakness. No deformities/ contractures  Vascular: BLE hemosiderin staining, trace edema bilaterally, sensation intact.  +2dp pulses to left DP, right +1 pulses, capillary refill > 3 seconds.   Left distal great toe- mixed etiology deep tissue pressure injury versus arterial insufficiency- 0.5cmx0.5cmx0, no palpable skin changes, non tender.  Skin:    Generalized dry flaky skin   Right side of neck fixed non draining sebaceous cyst?, not ulcerated.   Left sacrum- unstageable pressure injury- 1.8egy3jms6.1cm, prev 1.6hnr9iwz3.1cm- wound base minimal moist tan-yellow-brown softening eschar. Linear like pressure injury area of purple maroon discoloration resolved. No induration, no fluctuance, no crepitus. Scant serous drainage, no odor. No associated cellulitis. No sharp debridement indicated.  Psych: pleasant, calm, cooperative.      12-14-22 @ 07:01  -  12-15-22 @ 07:00  --------------------------------------------------------  IN: 30 mL / OUT: 225 mL / NET: -195 mL        LABS/ CULTURES/ RADIOLOGY:      CAPILLARY BLOOD GLUCOSE      A1C with Estimated Average Glucose Result: 6.5 % (10-14-22 @ 10:05)          
Patient is a 89y old  Male who presents with a chief complaint of Failure to Thrive (15 Dec 2022 14:19)      SUBJECTIVE / OVERNIGHT EVENTS: No acute events overnight. Pt has no new complaints     ADDITIONAL REVIEW OF SYSTEMS:    MEDICATIONS  (STANDING):  apixaban 5 milliGRAM(s) Oral two times a day  ascorbic acid 500 milliGRAM(s) Oral daily  calcium carbonate 1250 mG  + Vitamin D (OsCal 500 + D) 1 Tablet(s) Oral daily  carvedilol 3.125 milliGRAM(s) Oral every 12 hours  chlorhexidine 2% Cloths 1 Application(s) Topical daily  cholecalciferol 1000 Unit(s) Oral daily  dextrose 5%. 1000 milliLiter(s) (75 mL/Hr) IV Continuous <Continuous>  melatonin 6 milliGRAM(s) Oral at bedtime  multivitamin 1 Tablet(s) Oral daily  risperiDONE   Tablet 0.5 milliGRAM(s) Oral at bedtime  senna 2 Tablet(s) Oral at bedtime  tamsulosin 0.4 milliGRAM(s) Oral at bedtime    MEDICATIONS  (PRN):  acetaminophen     Tablet .. 650 milliGRAM(s) Oral every 6 hours PRN Temp greater or equal to 38C (100.4F), Mild Pain (1 - 3)  ondansetron Injectable 4 milliGRAM(s) IV Push every 8 hours PRN Nausea and/or Vomiting      CAPILLARY BLOOD GLUCOSE        I&O's Summary      PHYSICAL EXAM:  Vital Signs Last 24 Hrs  T(C): 36.3 (16 Dec 2022 11:25), Max: 37.4 (15 Dec 2022 20:58)  T(F): 97.4 (16 Dec 2022 11:25), Max: 99.3 (15 Dec 2022 20:58)  HR: 87 (16 Dec 2022 11:25) (70 - 97)  BP: 104/63 (16 Dec 2022 11:25) (91/56 - 114/67)  BP(mean): --  RR: 17 (16 Dec 2022 11:25) (17 - 20)  SpO2: 100% (16 Dec 2022 11:25) (95% - 100%)    Parameters below as of 16 Dec 2022 11:25  Patient On (Oxygen Delivery Method): room air      CONSTITUTIONAL: Elderly man, somewhat cachectic appearing, bitemporal wasting, in no apparent distress   RESPIRATORY: Breathing comfortably; lungs CTA without wheeze/rhonchi/rales  CARDIOVASCULAR: +S1S2,irregular; no lower extremity edema  GASTROINTESTINAL: soft, nontender, non distended  NEUROLOGIC: AA Ox1 to self  PSYCHIATRIC: Pleasantly confused.     LABS:                      RADIOLOGY & ADDITIONAL TESTS:  Results Reviewed:   Imaging Personally Reviewed:  Electrocardiogram Personally Reviewed:    COORDINATION OF CARE:  Care Discussed with Consultants/Other Providers [Y/N]:  Prior or Outpatient Records Reviewed [Y/N]:  
Taiwo Cosby MD  [employer] Hospitalist  Pager [phone]  Email: [email or web address]  Available on [employer] Teams        PROGRESS NOTE:     Patient is a 89y old  Male who presents with a chief complaint of Failure to Thrive (14 Dec 2022 12:00)      SUBJECTIVE / OVERNIGHT EVENTS:  Patient seen and examined this morning. Patient w/o acute overnight events.   ADDITIONAL REVIEW OF SYSTEMS:  No reports of f/c/n/v    MEDICATIONS  (STANDING):  apixaban 5 milliGRAM(s) Oral two times a day  ascorbic acid 500 milliGRAM(s) Oral daily  calcium carbonate 1250 mG  + Vitamin D (OsCal 500 + D) 1 Tablet(s) Oral daily  carvedilol 3.125 milliGRAM(s) Oral every 12 hours  chlorhexidine 2% Cloths 1 Application(s) Topical daily  cholecalciferol 1000 Unit(s) Oral daily  dextrose 5%. 1000 milliLiter(s) (75 mL/Hr) IV Continuous <Continuous>  melatonin 6 milliGRAM(s) Oral at bedtime  multivitamin 1 Tablet(s) Oral daily  risperiDONE   Tablet 0.5 milliGRAM(s) Oral at bedtime  senna 2 Tablet(s) Oral at bedtime  tamsulosin 0.4 milliGRAM(s) Oral at bedtime    MEDICATIONS  (PRN):  acetaminophen     Tablet .. 650 milliGRAM(s) Oral every 6 hours PRN Temp greater or equal to 38C (100.4F), Mild Pain (1 - 3)  ondansetron Injectable 4 milliGRAM(s) IV Push every 8 hours PRN Nausea and/or Vomiting      CAPILLARY BLOOD GLUCOSE        I&O's Summary    14 Dec 2022 07:01  -  15 Dec 2022 07:00  --------------------------------------------------------  IN: 30 mL / OUT: 225 mL / NET: -195 mL        PHYSICAL EXAM:  Vital Signs Last 24 Hrs  T(C): 36.7 (15 Dec 2022 13:36), Max: 37.6 (14 Dec 2022 21:05)  T(F): 98.1 (15 Dec 2022 13:36), Max: 99.6 (14 Dec 2022 21:05)  HR: 88 (15 Dec 2022 13:36) (82 - 89)  BP: 105/66 (15 Dec 2022 13:36) (96/56 - 107/67)  BP(mean): --  RR: 18 (15 Dec 2022 05:35) (17 - 18)  SpO2: 97% (15 Dec 2022 13:36) (97% - 99%)    Parameters below as of 15 Dec 2022 13:36  Patient On (Oxygen Delivery Method): room air        PHYSICAL EXAM:  CONSTITUTIONAL: Elderly man, somewhat cachectic appearing, bitemporal wasting, in no apparent distress   RESPIRATORY: Breathing comfortably; lungs CTA without wheeze/rhonchi/rales  CARDIOVASCULAR: +S1S2,irregular; no lower extremity edema  GASTROINTESTINAL: soft, nontender, non distended  NEUROLOGIC: AA Ox1 to self  PSYCHIATRIC: Pleasantly confused.     LABS:                      RADIOLOGY & ADDITIONAL TESTS:  Results Reviewed:   Imaging Personally Reviewed:  Electrocardiogram Personally Reviewed:    COORDINATION OF CARE:  Care Discussed with Consultants/Other Providers [Y/N]:  Prior or Outpatient Records Reviewed [Y/N]:  
Patient is a 89y old  Male who presents with a chief complaint of Failure to Thrive (09 Dec 2022 14:12)      SUBJECTIVE / OVERNIGHT EVENTS: Pt seen and examined at 11:20am, no overnight events, pt is confused unable to get much history, per nsg no new issues reported.    MEDICATIONS  (STANDING):  apixaban 5 milliGRAM(s) Oral two times a day  ascorbic acid 500 milliGRAM(s) Oral daily  calcium carbonate 1250 mG  + Vitamin D (OsCal 500 + D) 1 Tablet(s) Oral daily  carvedilol 3.125 milliGRAM(s) Oral every 12 hours  chlorhexidine 2% Cloths 1 Application(s) Topical daily  cholecalciferol 1000 Unit(s) Oral daily  dextrose 5%. 1000 milliLiter(s) (75 mL/Hr) IV Continuous <Continuous>  melatonin 6 milliGRAM(s) Oral at bedtime  multivitamin 1 Tablet(s) Oral daily  risperiDONE   Tablet 0.5 milliGRAM(s) Oral at bedtime  senna 2 Tablet(s) Oral at bedtime  tamsulosin 0.4 milliGRAM(s) Oral at bedtime    MEDICATIONS  (PRN):  acetaminophen     Tablet .. 650 milliGRAM(s) Oral every 6 hours PRN Temp greater or equal to 38C (100.4F), Mild Pain (1 - 3)  ondansetron Injectable 4 milliGRAM(s) IV Push every 8 hours PRN Nausea and/or Vomiting      Vital Signs Last 24 Hrs  T(C): 36.3 (10 Dec 2022 06:32), Max: 36.4 (09 Dec 2022 12:44)  T(F): 97.4 (10 Dec 2022 06:32), Max: 97.5 (09 Dec 2022 12:44)  HR: 79 (10 Dec 2022 06:32) (79 - 106)  BP: 101/68 (10 Dec 2022 06:32) (101/68 - 130/82)  BP(mean): --  RR: 16 (10 Dec 2022 06:32) (16 - 17)  SpO2: 94% (10 Dec 2022 06:32) (94% - 97%)    Parameters below as of 10 Dec 2022 06:32  Patient On (Oxygen Delivery Method): room air      CAPILLARY BLOOD GLUCOSE        I&O's Summary      PHYSICAL EXAM:  CONSTITUTIONAL: Elderly man, somewhat cachectic appearing, bitemporal wasting, in no apparent distress   RESPIRATORY: Breathing comfortably; lungs CTA without wheeze/rhonchi/rales  CARDIOVASCULAR: +S1S2,irregular; no lower extremity edema  GASTROINTESTINAL: soft, nontender, non distended  NEUROLOGIC: AA Ox1 to self  PSYCHIATRIC: confused.       LABS:                    RADIOLOGY & ADDITIONAL TESTS:    Imaging Personally Reviewed:    Consultant(s) Notes Reviewed:      Care Discussed with Consultants/Other Providers:  
Taiwo Cosby MD  Academic Hospitalist  Pager [phone]  Email: [email or web address]  Available on [employer] Teams        PROGRESS NOTE:     Patient is a 89y old  Male who presents with a chief complaint of Failure to Thrive (13 Dec 2022 12:01)      SUBJECTIVE / OVERNIGHT EVENTS:  Patient seen and examined this morning. Patient w/o acute overnight events.   ADDITIONAL REVIEW OF SYSTEMS:  No reports of f/c/n/v    MEDICATIONS  (STANDING):  apixaban 5 milliGRAM(s) Oral two times a day  ascorbic acid 500 milliGRAM(s) Oral daily  calcium carbonate 1250 mG  + Vitamin D (OsCal 500 + D) 1 Tablet(s) Oral daily  carvedilol 3.125 milliGRAM(s) Oral every 12 hours  chlorhexidine 2% Cloths 1 Application(s) Topical daily  cholecalciferol 1000 Unit(s) Oral daily  dextrose 5%. 1000 milliLiter(s) (75 mL/Hr) IV Continuous <Continuous>  melatonin 6 milliGRAM(s) Oral at bedtime  multivitamin 1 Tablet(s) Oral daily  risperiDONE   Tablet 0.5 milliGRAM(s) Oral at bedtime  senna 2 Tablet(s) Oral at bedtime  tamsulosin 0.4 milliGRAM(s) Oral at bedtime    MEDICATIONS  (PRN):  acetaminophen     Tablet .. 650 milliGRAM(s) Oral every 6 hours PRN Temp greater or equal to 38C (100.4F), Mild Pain (1 - 3)  ondansetron Injectable 4 milliGRAM(s) IV Push every 8 hours PRN Nausea and/or Vomiting      CAPILLARY BLOOD GLUCOSE        I&O's Summary      PHYSICAL EXAM:  Vital Signs Last 24 Hrs  T(C): 36.9 (14 Dec 2022 06:50), Max: 36.9 (14 Dec 2022 06:50)  T(F): 98.4 (14 Dec 2022 06:50), Max: 98.4 (14 Dec 2022 06:50)  HR: 75 (14 Dec 2022 06:50) (75 - 90)  BP: 103/64 (14 Dec 2022 06:50) (99/60 - 114/69)  BP(mean): --  RR: 17 (14 Dec 2022 06:50) (17 - 18)  SpO2: 96% (14 Dec 2022 06:50) (96% - 100%)    Parameters below as of 14 Dec 2022 06:50  Patient On (Oxygen Delivery Method): room air        PHYSICAL EXAM:  CONSTITUTIONAL: Elderly man, somewhat cachectic appearing, bitemporal wasting, in no apparent distress   RESPIRATORY: Breathing comfortably; lungs CTA without wheeze/rhonchi/rales  CARDIOVASCULAR: +S1S2,irregular; no lower extremity edema  GASTROINTESTINAL: soft, nontender, non distended  NEUROLOGIC: AA Ox1 to self  PSYCHIATRIC: Pleasantly confused.       LABS:                      RADIOLOGY & ADDITIONAL TESTS:  Results Reviewed:   Imaging Personally Reviewed:  Electrocardiogram Personally Reviewed:    COORDINATION OF CARE:  Care Discussed with Consultants/Other Providers [Y/N]: Case discussed during interdisciplinary rounds with social work and case management  Prior or Outpatient Records Reviewed [Y/N]:  
Patient is a 89y old  Male who presents with a chief complaint of Failure to Thrive (16 Dec 2022 12:44)      SUBJECTIVE / OVERNIGHT EVENTS: No acute events overnight. Pt has no new complaints     ADDITIONAL REVIEW OF SYSTEMS:    MEDICATIONS  (STANDING):  apixaban 5 milliGRAM(s) Oral two times a day  ascorbic acid 500 milliGRAM(s) Oral daily  calcium carbonate 1250 mG  + Vitamin D (OsCal 500 + D) 1 Tablet(s) Oral daily  carvedilol 3.125 milliGRAM(s) Oral every 12 hours  chlorhexidine 2% Cloths 1 Application(s) Topical daily  cholecalciferol 1000 Unit(s) Oral daily  dextrose 5%. 1000 milliLiter(s) (75 mL/Hr) IV Continuous <Continuous>  melatonin 6 milliGRAM(s) Oral at bedtime  multivitamin 1 Tablet(s) Oral daily  risperiDONE   Tablet 0.5 milliGRAM(s) Oral at bedtime  senna 2 Tablet(s) Oral at bedtime  tamsulosin 0.4 milliGRAM(s) Oral at bedtime    MEDICATIONS  (PRN):  acetaminophen     Tablet .. 650 milliGRAM(s) Oral every 6 hours PRN Temp greater or equal to 38C (100.4F), Mild Pain (1 - 3)  ondansetron Injectable 4 milliGRAM(s) IV Push every 8 hours PRN Nausea and/or Vomiting      CAPILLARY BLOOD GLUCOSE        I&O's Summary      PHYSICAL EXAM:  Vital Signs Last 24 Hrs  T(C): 36.4 (17 Dec 2022 12:31), Max: 36.8 (16 Dec 2022 20:39)  T(F): 97.5 (17 Dec 2022 12:31), Max: 98.2 (16 Dec 2022 20:39)  HR: 98 (17 Dec 2022 12:31) (86 - 99)  BP: 104/62 (17 Dec 2022 12:31) (104/62 - 115/81)  BP(mean): --  RR: 18 (17 Dec 2022 12:31) (18 - 20)  SpO2: 99% (17 Dec 2022 12:31) (93% - 99%)    Parameters below as of 17 Dec 2022 12:31  Patient On (Oxygen Delivery Method): room air      CONSTITUTIONAL: Elderly man, somewhat cachectic appearing, bitemporal wasting, in no apparent distress   RESPIRATORY: Breathing comfortably; lungs CTA without wheeze/rhonchi/rales  CARDIOVASCULAR: +S1S2,irregular; no lower extremity edema  GASTROINTESTINAL: soft, nontender, non distended  NEUROLOGIC: AA Ox1 to self        LABS:                      RADIOLOGY & ADDITIONAL TESTS:  Results Reviewed:   Imaging Personally Reviewed:  Electrocardiogram Personally Reviewed:    COORDINATION OF CARE:  Care Discussed with Consultants/Other Providers [Y/N]:  Prior or Outpatient Records Reviewed [Y/N]:  
Taiwo Cosby MD  Academic Hospitalist  Pager [phone]  Email: [email or web address]  Available on [employer] Teams        PROGRESS NOTE:     Patient is a 89y old  Male who presents with a chief complaint of Failure to Thrive (12 Dec 2022 16:35)      SUBJECTIVE / OVERNIGHT EVENTS:  Patient seen and examined this morning. Patient w/o acute overnight events.   ADDITIONAL REVIEW OF SYSTEMS:  No reports of f/c/n/v    MEDICATIONS  (STANDING):  apixaban 5 milliGRAM(s) Oral two times a day  ascorbic acid 500 milliGRAM(s) Oral daily  calcium carbonate 1250 mG  + Vitamin D (OsCal 500 + D) 1 Tablet(s) Oral daily  carvedilol 3.125 milliGRAM(s) Oral every 12 hours  chlorhexidine 2% Cloths 1 Application(s) Topical daily  cholecalciferol 1000 Unit(s) Oral daily  dextrose 5%. 1000 milliLiter(s) (75 mL/Hr) IV Continuous <Continuous>  melatonin 6 milliGRAM(s) Oral at bedtime  multivitamin 1 Tablet(s) Oral daily  risperiDONE   Tablet 0.5 milliGRAM(s) Oral at bedtime  senna 2 Tablet(s) Oral at bedtime  tamsulosin 0.4 milliGRAM(s) Oral at bedtime    MEDICATIONS  (PRN):  acetaminophen     Tablet .. 650 milliGRAM(s) Oral every 6 hours PRN Temp greater or equal to 38C (100.4F), Mild Pain (1 - 3)  ondansetron Injectable 4 milliGRAM(s) IV Push every 8 hours PRN Nausea and/or Vomiting      CAPILLARY BLOOD GLUCOSE        I&O's Summary      PHYSICAL EXAM:  Vital Signs Last 24 Hrs  T(C): 36.7 (13 Dec 2022 05:20), Max: 36.9 (12 Dec 2022 14:00)  T(F): 98.1 (13 Dec 2022 05:20), Max: 98.5 (12 Dec 2022 14:00)  HR: 73 (13 Dec 2022 05:20) (60 - 79)  BP: 109/70 (13 Dec 2022 05:20) (100/64 - 109/70)  BP(mean): --  RR: 18 (13 Dec 2022 05:20) (17 - 18)  SpO2: 96% (12 Dec 2022 20:21) (96% - 98%)    Parameters below as of 12 Dec 2022 20:21  Patient On (Oxygen Delivery Method): room air        PHYSICAL EXAM:  CONSTITUTIONAL: Elderly man, somewhat cachectic appearing, bitemporal wasting, in no apparent distress   RESPIRATORY: Breathing comfortably; lungs CTA without wheeze/rhonchi/rales  CARDIOVASCULAR: +S1S2,irregular; no lower extremity edema  GASTROINTESTINAL: soft, nontender, non distended  NEUROLOGIC: AA Ox1 to self  PSYCHIATRIC: Pleasantly confused.     LABS:                        13.3   7.52  )-----------( 370      ( 12 Dec 2022 10:37 )             40.4     12-12    144  |  114<H>  |  14  ----------------------------<  176<H>  3.7   |  23  |  0.58    Ca    8.6      12 Dec 2022 10:37  Phos  2.5     12-12  Mg     2.10     12-12                  RADIOLOGY & ADDITIONAL TESTS:  Results Reviewed:   Imaging Personally Reviewed:  Electrocardiogram Personally Reviewed:    COORDINATION OF CARE:  Care Discussed with Consultants/Other Providers [Y/N]: Case discussed during interdisciplinary rounds with social work and case management  Prior or Outpatient Records Reviewed [Y/N]:  
Taiwo Cosby MD  Academic Hospitalist  Pager [phone]  Email: [email or web address]  Available on [employer] Teams        PROGRESS NOTE:     Patient is a 89y old  Male who presents with a chief complaint of Failure to Thrive (11 Dec 2022 12:42)      SUBJECTIVE / OVERNIGHT EVENTS:  Patient seen and examined this morning. Patient w/o acute overnight events.   ADDITIONAL REVIEW OF SYSTEMS:  No reports of f/c/n/v    MEDICATIONS  (STANDING):  apixaban 5 milliGRAM(s) Oral two times a day  ascorbic acid 500 milliGRAM(s) Oral daily  calcium carbonate 1250 mG  + Vitamin D (OsCal 500 + D) 1 Tablet(s) Oral daily  carvedilol 3.125 milliGRAM(s) Oral every 12 hours  chlorhexidine 2% Cloths 1 Application(s) Topical daily  cholecalciferol 1000 Unit(s) Oral daily  dextrose 5%. 1000 milliLiter(s) (75 mL/Hr) IV Continuous <Continuous>  melatonin 6 milliGRAM(s) Oral at bedtime  multivitamin 1 Tablet(s) Oral daily  risperiDONE   Tablet 0.5 milliGRAM(s) Oral at bedtime  senna 2 Tablet(s) Oral at bedtime  tamsulosin 0.4 milliGRAM(s) Oral at bedtime    MEDICATIONS  (PRN):  acetaminophen     Tablet .. 650 milliGRAM(s) Oral every 6 hours PRN Temp greater or equal to 38C (100.4F), Mild Pain (1 - 3)  ondansetron Injectable 4 milliGRAM(s) IV Push every 8 hours PRN Nausea and/or Vomiting      CAPILLARY BLOOD GLUCOSE        I&O's Summary      PHYSICAL EXAM:  Vital Signs Last 24 Hrs  T(C): 36.4 (12 Dec 2022 06:29), Max: 36.9 (11 Dec 2022 14:30)  T(F): 97.5 (12 Dec 2022 06:29), Max: 98.4 (11 Dec 2022 14:30)  HR: 73 (12 Dec 2022 06:29) (61 - 85)  BP: 96/65 (12 Dec 2022 06:29) (96/65 - 115/75)  BP(mean): --  RR: 17 (12 Dec 2022 06:29) (17 - 18)  SpO2: 94% (12 Dec 2022 06:29) (94% - 96%)    Parameters below as of 12 Dec 2022 06:29  Patient On (Oxygen Delivery Method): room air        PHYSICAL EXAM:  CONSTITUTIONAL: Elderly man, somewhat cachectic appearing, bitemporal wasting, in no apparent distress   RESPIRATORY: Breathing comfortably; lungs CTA without wheeze/rhonchi/rales  CARDIOVASCULAR: +S1S2,irregular; no lower extremity edema  GASTROINTESTINAL: soft, nontender, non distended  NEUROLOGIC: AA Ox1 to self  PSYCHIATRIC: confused.     LABS:                        13.3   7.52  )-----------( 370      ( 12 Dec 2022 10:37 )             40.4     12-12    144  |  114<H>  |  14  ----------------------------<  176<H>  3.7   |  23  |  0.58    Ca    8.6      12 Dec 2022 10:37  Phos  2.5     12-12  Mg     2.10     12-12                  RADIOLOGY & ADDITIONAL TESTS:  Results Reviewed:   Imaging Personally Reviewed:  Electrocardiogram Personally Reviewed:    COORDINATION OF CARE:  Care Discussed with Consultants/Other Providers [Y/N]: Case discussed during interdisciplinary rounds with social work and case management  Prior or Outpatient Records Reviewed [Y/N]:  
Taiwo Cosby MD  Academic Hospitalist  Pager [phone]  Email: [email or web address]  Available on [employer] Teams        PROGRESS NOTE:     Patient is a 89y old  Male who presents with a chief complaint of Failure to thrive in adult     (08 Dec 2022 15:31)      SUBJECTIVE / OVERNIGHT EVENTS:  Patient seen and examined this morning. No acute complaints. Patient is calm and engaging in a pleasant manner.   ADDITIONAL REVIEW OF SYSTEMS:  No f/c/n/v    MEDICATIONS  (STANDING):  apixaban 5 milliGRAM(s) Oral two times a day  ascorbic acid 500 milliGRAM(s) Oral daily  calcium carbonate 1250 mG  + Vitamin D (OsCal 500 + D) 1 Tablet(s) Oral daily  carvedilol 3.125 milliGRAM(s) Oral every 12 hours  chlorhexidine 2% Cloths 1 Application(s) Topical daily  cholecalciferol 1000 Unit(s) Oral daily  dextrose 5%. 1000 milliLiter(s) (75 mL/Hr) IV Continuous <Continuous>  melatonin 6 milliGRAM(s) Oral at bedtime  multivitamin 1 Tablet(s) Oral daily  risperiDONE   Tablet 0.5 milliGRAM(s) Oral at bedtime  senna 2 Tablet(s) Oral at bedtime  tamsulosin 0.4 milliGRAM(s) Oral at bedtime    MEDICATIONS  (PRN):  acetaminophen     Tablet .. 650 milliGRAM(s) Oral every 6 hours PRN Temp greater or equal to 38C (100.4F), Mild Pain (1 - 3)  ondansetron Injectable 4 milliGRAM(s) IV Push every 8 hours PRN Nausea and/or Vomiting      CAPILLARY BLOOD GLUCOSE        I&O's Summary    08 Dec 2022 07:01  -  09 Dec 2022 07:00  --------------------------------------------------------  IN: 525 mL / OUT: 0 mL / NET: 525 mL        PHYSICAL EXAM:  Vital Signs Last 24 Hrs  T(C): 36.4 (09 Dec 2022 12:44), Max: 36.8 (08 Dec 2022 15:09)  T(F): 97.5 (09 Dec 2022 12:44), Max: 98.2 (08 Dec 2022 15:09)  HR: 92 (09 Dec 2022 12:44) (65 - 107)  BP: 130/82 (09 Dec 2022 12:44) (112/83 - 130/82)  BP(mean): --  RR: 17 (09 Dec 2022 12:44) (15 - 17)  SpO2: 97% (09 Dec 2022 12:44) (97% - 100%)    Parameters below as of 09 Dec 2022 12:44  Patient On (Oxygen Delivery Method): room air        CONSTITUTIONAL: Elderly man, somewhat cachectic appearing, bitemporal wasting, in no apparent distress  EYES: No conjunctival or scleral injection,   ENMT: No external nasal lesions; poor denitition   RESPIRATORY: Breathing comfortably; no dullness to percussion; lungs CTA without wheeze/rhonchi/rales  CARDIOVASCULAR: +S1S2, mildly tachycardic and irregular no M/G/R; pedal pulses diminished and symmetric; no lower extremity edema  GASTROINTESTINAL: No palpable masses or tenderness, +BS throughout, no rebound/guarding; no hernia palpated  NEUROLOGIC: CN II-XII intact; sensation intact in LEs b/l to light touch  PSYCHIATRIC: A+O x 1; mood and affect appropriate; calm and engaging in a pleasant manner, though confused.     LABS:                        15.4   6.22  )-----------( 300      ( 08 Dec 2022 06:00 )             48.7     12-08    150<H>  |  115<H>  |  22  ----------------------------<  141<H>  3.5   |  23  |  0.81    Ca    9.0      08 Dec 2022 06:00  Phos  2.7     12-07  Mg     2.10     12-07                Culture - Urine (collected 07 Dec 2022 19:21)  Source: Clean Catch Clean Catch (Midstream)  Final Report (08 Dec 2022 21:21):    No growth    Culture - Blood (collected 07 Dec 2022 13:45)  Source: .Blood Blood-Venous  Preliminary Report (08 Dec 2022 18:02):    No growth to date.    Culture - Blood (collected 07 Dec 2022 13:08)  Source: .Blood Blood-Peripheral  Preliminary Report (08 Dec 2022 18:02):    No growth to date.        RADIOLOGY & ADDITIONAL TESTS:  Results Reviewed:   Imaging Personally Reviewed:  Electrocardiogram Personally Reviewed:    COORDINATION OF CARE:  Care Discussed with Consultants/Other Providers [Y/N]: Patient care discussed with oncology and palliative care during interdisciplinary rounds, case management, nursing management  and social work were also present.   Prior or Outpatient Records Reviewed [Y/N]:  
Patient is a 89y old  Male who presents with a chief complaint of Failure to Thrive (18 Dec 2022 15:36)      SUBJECTIVE / OVERNIGHT EVENTS: No acute events overnight. Pt has no new complaints     ADDITIONAL REVIEW OF SYSTEMS:    MEDICATIONS  (STANDING):  apixaban 5 milliGRAM(s) Oral two times a day  ascorbic acid 500 milliGRAM(s) Oral daily  calcium carbonate 1250 mG  + Vitamin D (OsCal 500 + D) 1 Tablet(s) Oral daily  carvedilol 3.125 milliGRAM(s) Oral every 12 hours  chlorhexidine 2% Cloths 1 Application(s) Topical daily  cholecalciferol 1000 Unit(s) Oral daily  dextrose 5%. 1000 milliLiter(s) (75 mL/Hr) IV Continuous <Continuous>  melatonin 6 milliGRAM(s) Oral at bedtime  multivitamin 1 Tablet(s) Oral daily  risperiDONE   Tablet 0.5 milliGRAM(s) Oral at bedtime  senna 2 Tablet(s) Oral at bedtime  tamsulosin 0.4 milliGRAM(s) Oral at bedtime    MEDICATIONS  (PRN):  acetaminophen     Tablet .. 650 milliGRAM(s) Oral every 6 hours PRN Temp greater or equal to 38C (100.4F), Mild Pain (1 - 3)  ondansetron Injectable 4 milliGRAM(s) IV Push every 8 hours PRN Nausea and/or Vomiting      CAPILLARY BLOOD GLUCOSE        I&O's Summary      PHYSICAL EXAM:  Vital Signs Last 24 Hrs  T(C): 36.4 (19 Dec 2022 06:30), Max: 36.6 (18 Dec 2022 22:45)  T(F): 97.6 (19 Dec 2022 06:30), Max: 97.8 (18 Dec 2022 22:45)  HR: 96 (19 Dec 2022 06:30) (96 - 98)  BP: 104/67 (19 Dec 2022 06:30) (104/67 - 109/59)  BP(mean): 71 (18 Dec 2022 18:34) (71 - 71)  RR: 16 (19 Dec 2022 06:30) (16 - 18)  SpO2: 100% (19 Dec 2022 06:30) (95% - 100%)    Parameters below as of 19 Dec 2022 06:30  Patient On (Oxygen Delivery Method): room air      CONSTITUTIONAL: Elderly man, somewhat cachectic appearing, bitemporal wasting, in no apparent distress   RESPIRATORY: Breathing comfortably; lungs CTA without wheeze/rhonchi/rales  CARDIOVASCULAR: +S1S2,irregular; no lower extremity edema  GASTROINTESTINAL: soft, nontender, non distended  NEUROLOGIC: AA Ox1 to self      LABS:                        13.2   6.39  )-----------( 286      ( 19 Dec 2022 08:00 )             41.5     12-19    144  |  112<H>  |  18  ----------------------------<  112<H>  4.5   |  21<L>  |  0.59    Ca    8.9      19 Dec 2022 08:00  Phos  2.7     12-19  Mg     2.30     12-19                  RADIOLOGY & ADDITIONAL TESTS:  Results Reviewed:   Imaging Personally Reviewed:  Electrocardiogram Personally Reviewed:    COORDINATION OF CARE:  Care Discussed with Consultants/Other Providers [Y/N]:  Prior or Outpatient Records Reviewed [Y/N]:  
Patient is a 89y old  Male who presents with a chief complaint of Failure to Thrive (10 Dec 2022 12:39)      SUBJECTIVE / OVERNIGHT EVENTS: Pt seen and examined at 11:35am, no overnight events, pt is confused unable to get much history, drinking [place] juice, says he is coming along when asked how he is doing, per nsg no new issues reported.        MEDICATIONS  (STANDING):  apixaban 5 milliGRAM(s) Oral two times a day  ascorbic acid 500 milliGRAM(s) Oral daily  calcium carbonate 1250 mG  + Vitamin D (OsCal 500 + D) 1 Tablet(s) Oral daily  carvedilol 3.125 milliGRAM(s) Oral every 12 hours  chlorhexidine 2% Cloths 1 Application(s) Topical daily  cholecalciferol 1000 Unit(s) Oral daily  dextrose 5%. 1000 milliLiter(s) (75 mL/Hr) IV Continuous <Continuous>  melatonin 6 milliGRAM(s) Oral at bedtime  multivitamin 1 Tablet(s) Oral daily  risperiDONE   Tablet 0.5 milliGRAM(s) Oral at bedtime  senna 2 Tablet(s) Oral at bedtime  tamsulosin 0.4 milliGRAM(s) Oral at bedtime    MEDICATIONS  (PRN):  acetaminophen     Tablet .. 650 milliGRAM(s) Oral every 6 hours PRN Temp greater or equal to 38C (100.4F), Mild Pain (1 - 3)  ondansetron Injectable 4 milliGRAM(s) IV Push every 8 hours PRN Nausea and/or Vomiting      Vital Signs Last 24 Hrs  T(C): 36.7 (11 Dec 2022 05:20), Max: 36.7 (11 Dec 2022 05:20)  T(F): 98 (11 Dec 2022 05:20), Max: 98 (11 Dec 2022 05:20)  HR: 88 (11 Dec 2022 05:20) (88 - 98)  BP: 103/64 (11 Dec 2022 05:20) (100/64 - 110/70)  BP(mean): --  RR: 16 (11 Dec 2022 05:20) (14 - 17)  SpO2: 96% (11 Dec 2022 05:20) (93% - 98%)    Parameters below as of 11 Dec 2022 05:20  Patient On (Oxygen Delivery Method): room air      CAPILLARY BLOOD GLUCOSE        I&O's Summary      PHYSICAL EXAM:  CONSTITUTIONAL: Elderly man, somewhat cachectic appearing, bitemporal wasting, in no apparent distress   RESPIRATORY: Breathing comfortably; lungs CTA without wheeze/rhonchi/rales  CARDIOVASCULAR: +S1S2,irregular; no lower extremity edema  GASTROINTESTINAL: soft, nontender, non distended  NEUROLOGIC: AA Ox1 to self  PSYCHIATRIC: confused.         LABS:                        13.7   6.48  )-----------( 360      ( 10 Dec 2022 13:39 )             42.4     12-10    147<H>  |  116<H>  |  18  ----------------------------<  146<H>  3.3<L>   |  21<L>  |  0.63    Ca    8.9      10 Dec 2022 13:39  Phos  2.8     12-10  Mg     2.20     12-10                RADIOLOGY & ADDITIONAL TESTS:    Imaging Personally Reviewed:    Consultant(s) Notes Reviewed:      Care Discussed with Consultants/Other Providers:  
Taiwo Cosby MD  Academic Hospitalist  Pager [phone]  Email: [email or web address]  Available on [employer] Teams        PROGRESS NOTE:     Patient is a 89y old  Male who presents with a chief complaint of Failure to Thrive (08 Dec 2022 11:46)      SUBJECTIVE / OVERNIGHT EVENTS:  Patient seen and examined this morning. No acute events overnight.   ADDITIONAL REVIEW OF SYSTEMS:  Review of system unobtainable secondary to mental status.    MEDICATIONS  (STANDING):  apixaban 5 milliGRAM(s) Oral two times a day  ascorbic acid 500 milliGRAM(s) Oral daily  calcium carbonate 1250 mG  + Vitamin D (OsCal 500 + D) 1 Tablet(s) Oral daily  carvedilol 3.125 milliGRAM(s) Oral every 12 hours  chlorhexidine 2% Cloths 1 Application(s) Topical daily  cholecalciferol 1000 Unit(s) Oral daily  dextrose 5%. 1000 milliLiter(s) (75 mL/Hr) IV Continuous <Continuous>  melatonin 6 milliGRAM(s) Oral at bedtime  multivitamin 1 Tablet(s) Oral daily  risperiDONE   Tablet 0.5 milliGRAM(s) Oral at bedtime  senna 2 Tablet(s) Oral at bedtime  tamsulosin 0.4 milliGRAM(s) Oral at bedtime    MEDICATIONS  (PRN):  acetaminophen     Tablet .. 650 milliGRAM(s) Oral every 6 hours PRN Temp greater or equal to 38C (100.4F), Mild Pain (1 - 3)  ondansetron Injectable 4 milliGRAM(s) IV Push every 8 hours PRN Nausea and/or Vomiting      CAPILLARY BLOOD GLUCOSE        I&O's Summary      PHYSICAL EXAM:  Vital Signs Last 24 Hrs  T(C): 36.6 (08 Dec 2022 07:35), Max: 36.6 (08 Dec 2022 07:35)  T(F): 97.9 (08 Dec 2022 07:35), Max: 97.9 (08 Dec 2022 07:35)  HR: 80 (08 Dec 2022 07:35) (80 - 135)  BP: 127/70 (08 Dec 2022 07:35) (122/79 - 147/82)  BP(mean): --  RR: 16 (08 Dec 2022 07:35) (15 - 20)  SpO2: 100% (08 Dec 2022 07:35) (97% - 100%)    Parameters below as of 08 Dec 2022 07:35  Patient On (Oxygen Delivery Method): room air        CONSTITUTIONAL: Elderly man, somewhat cachectic appearing, bitemporal wasting, in no apparent distress  EYES: No conjunctival or scleral injection,   ENMT: No external nasal lesions; poor denitition   RESPIRATORY: Breathing comfortably; no dullness to percussion; lungs CTA without wheeze/rhonchi/rales  CARDIOVASCULAR: +S1S2, mildly tachycardic and irregular no M/G/R; pedal pulses diminished and symmetric; no lower extremity edema  GASTROINTESTINAL: No palpable masses or tenderness, +BS throughout, no rebound/guarding; no hernia palpated  NEUROLOGIC: CN II-XII intact; sensation intact in LEs b/l to light touch  PSYCHIATRIC: A+O x 1; mood and affect appropriate; nonsensical speech    LABS:                        15.4   6.22  )-----------( 300      ( 08 Dec 2022 06:00 )             48.7     12-08    150<H>  |  115<H>  |  22  ----------------------------<  141<H>  3.5   |  23  |  0.81    Ca    9.0      08 Dec 2022 06:00  Phos  2.7     12-07  Mg     2.10     12-07    TPro  7.5  /  Alb  3.2<L>  /  TBili  1.8<H>  /  DBili  x   /  AST  14  /  ALT  13  /  AlkPhos  203<H>  12-07    PT/INR - ( 07 Dec 2022 13:58 )   PT: 17.7 sec;   INR: 1.52 ratio         PTT - ( 07 Dec 2022 13:58 )  PTT:34.0 sec      Urinalysis Basic - ( 07 Dec 2022 12:31 )    Color: Elena / Appearance: Clear / S.028 / pH: x  Gluc: x / Ketone: Negative  / Bili: Small / Urobili: 6 mg/dL   Blood: x / Protein: 30 mg/dL / Nitrite: Negative   Leuk Esterase: Negative / RBC: 134 /HPF / WBC 3 /HPF   Sq Epi: x / Non Sq Epi: 1 /HPF / Bacteria: Occasional          RADIOLOGY & ADDITIONAL TESTS:  Results Reviewed:   Imaging Personally Reviewed:  Electrocardiogram Personally Reviewed:    COORDINATION OF CARE:  Care Discussed with Consultants/Other Providers [Y/N]: Case discussed during interdisciplinary rounds with social work and case management  Prior or Outpatient Records Reviewed [Y/N]:

## 2022-12-20 NOTE — PROGRESS NOTE ADULT - PROBLEM SELECTOR PLAN 2
Resolved
Likely d/t hypovolemia  - Repeat BMP and start D5 by free water deficit  - Do not correct by more than 6-8mEq in 24h  -Improving, continue to monitor  -f/u today's labs
Resolved
Likely d/t hypovolemia  - Repeat BMP and start D5 by free water deficit  - Do not correct by more than 6-8mEq in 24h  -Improving, continue to monitor
Likely d/t hypovolemia  - Repeat BMP and start D5 by free water deficit  - Do not correct by more than 6-8mEq in 24h  -Improving, continue to monitor
Resolved
Resolved
Likely d/t hypovolemia  - Repeat BMP and start D5 by free water deficit  - Do not correct by more than 6-8mEq in 24h  -Improving, continue to monitor

## 2022-12-20 NOTE — PROGRESS NOTE ADULT - PROBLEM SELECTOR PLAN 6
No hematuria noted at this time.  RBCs on UA, family endorsing dark urine with large clots.  - Hgb currently stable (elevated i/s/o hemoconcentration), if drops significantly consider holding eliquis but for now continu  - Continue with tamsulosin .4mg QHS
RBCs on UA, family endorsing dark urine with large clots.  - Hgb currently stable (elevated i/s/o hemoconcentration), if drops significantly consider holding eliquis but for now continue  - If patient endorsing suprapubic pain, consider uro consult vs trial of belladonna/opium  - Continue with tamsulosin .4mg QHS
No hematuria noted at this time.  RBCs on UA, family endorsing dark urine with large clots.  - Hgb currently stable (elevated i/s/o hemoconcentration), if drops significantly consider holding eliquis but for now continu  - Continue with tamsulosin .4mg QHS
RBCs on UA, family endorsing dark urine with large clots.  - Hgb currently stable (elevated i/s/o hemoconcentration), if drops significantly consider holding eliquis but for now continue  - If patient endorsing suprapubic pain, consider uro consult vs trial of belladonna/opium  - Continue with tamsulosin .4mg QHS
No hematuria noted at this time.  RBCs on UA, family endorsing dark urine with large clots.  - Hgb currently stable (elevated i/s/o hemoconcentration), if drops significantly consider holding eliquis but for now continu  - Continue with tamsulosin .4mg QHS
No hematuria noted at this time.  RBCs on UA, family endorsing dark urine with large clots.  - Hgb currently stable (elevated i/s/o hemoconcentration), if drops significantly consider holding eliquis but for now continu  - Continue with tamsulosin .4mg QHS
RBCs on UA, family endorsing dark urine with large clots.  - Hgb currently stable (elevated i/s/o hemoconcentration), if drops significantly consider holding eliquis but for now continue  - If patient endorsing suprapubic pain, consider uro consult vs trial of belladonna/opium  - Continue with tamsulosin .4mg QHS
No hematuria noted at this time.  RBCs on UA, family endorsing dark urine with large clots.  - Hgb currently stable (elevated i/s/o hemoconcentration), if drops significantly consider holding eliquis but for now continu  - Continue with tamsulosin .4mg QHS
RBCs on UA, family endorsing dark urine with large clots.  - Hgb currently stable (elevated i/s/o hemoconcentration), if drops significantly consider holding eliquis but for now continue  - If patient endorsing suprapubic pain, consider uro consult vs trial of belladonna/opium  - Continue with tamsulosin .4mg QHS

## 2022-12-20 NOTE — PROGRESS NOTE ADULT - PROBLEM SELECTOR PLAN 1
Lack of PO intake (food or drink) at home x 1 month. DDx includes progression of dementia vs secondary to abdominal pain from choledocholithiasis.   - Nutrition consult  - [place] diet to encourage PO intake  - Family would like to place patient in a long term facility  - Discussed with family, CT a/p cancelled, as patient is comfortable and they would like to hold off any kind of aggressive intervention if anything is found.
Lack of PO intake (food or drink) at home x 1 month. DDx includes progression of dementia vs secondary to abdominal pain from choledocholithiasis.   - Nutrition consult  - [place] diet to encourage PO intake  - Family would like to place patient in a long term facility  - Discussed with family, CT a/p cancelled, as patient is comfortable and they would like to hold off any kind of aggressive intervention if anything is found.
Lack of PO intake (food or drink) at home x 1 month. DDx includes progression of dementia vs secondary to abdominal pain from choledocholithiasis.   - Nutrition consult  - [place] diet to encourage PO intake  - Family would like to place patient in a long term facility  - Discussed with family, CT a/p cancelled, as patient is comfortable and they would like to hold off any kind of aggressive intervention if anything is found.
Lack of PO intake (food or drink) at home x 1 month. DDx includes progression of dementia vs secondary to abdominal pain from choledocholithiasis.   - Nutrition consult  - [place] diet to encourage PO intake  - Family would like to place patient in a long term facility  - Discussed with family, CT a/p cancelled, as patient is comfortable and they would like to hold off any kind of aggressive intervention if anything is found.
Lack of PO intake (food or drink) at home x 1 month. DDx includes progression of dementia vs secondary to abdominal pain from choledocholithiasis.   - Nutrition consult  - [place] diet to encourage PO intake  - Family would like to place patient in a long term facility  - Discussed with family, CT a/p cancelled, as patient is comfortable and they would like to hold off any kind of aggressive intervention if anything is found.
Lack of PO intake (food or drink) at home x 1 month. DDx includes progression of dementia vs secondary to abdominal pain from choledocholithiasis.   - F/u CT abdomen pelvis  - Nutrition consult  - [place] diet to encourage PO intake  - Family would like to place patient in a long term facility
Lack of PO intake (food or drink) at home x 1 month. DDx includes progression of dementia vs secondary to abdominal pain from choledocholithiasis.   - F/u CT abdomen pelvis- still pending  - Nutrition consult  - [place] diet to encourage PO intake  - Family would like to place patient in a long term facility
Lack of PO intake (food or drink) at home x 1 month. DDx includes progression of dementia vs secondary to abdominal pain from choledocholithiasis.   - Nutrition consult  - [place] diet to encourage PO intake  - Family would like to place patient in a long term facility  - Discussed with family, CT a/p cancelled, as patient is comfortable and they would like to hold off any kind of aggressive intervention if anything is found.
Lack of PO intake (food or drink) at home x 1 month. DDx includes progression of dementia vs secondary to abdominal pain from choledocholithiasis.   - Nutrition consult  - [place] diet to encourage PO intake  - Family would like to place patient in a long term facility  - Discussed with family, CT a/p cancelled, as patient is comfortable and they would like to hold off any kind of aggressive intervention if anything is found.
Lack of PO intake (food or drink) at home x 1 month. DDx includes progression of dementia vs secondary to abdominal pain from choledocholithiasis.   - Nutrition consult  - [place] diet to encourage PO intake  - Family would like to place patient in a long term facility  - Discussed with family, CT a/p cancelled, as patient is comfortable and they would like to hold off any kind of aggressive intervention if anything is found.
Lack of PO intake (food or drink) at home x 1 month. DDx includes progression of dementia vs secondary to abdominal pain from choledocholithiasis.   - Nutrition consult  - [place] diet to encourage PO intake  - Family would like to place patient in a long term facility  - Discussed with family, CT a/p cancelled, as patient is comfortable and they would like to hold off any kind of aggressive intervention if anything is found.
Lack of PO intake (food or drink) at home x 1 month. DDx includes progression of dementia vs secondary to abdominal pain from choledocholithiasis.   - F/u CT abdomen pelvis  - Nutrition consult  - [place] diet to encourage PO intake  - Family would like to place patient in a long term facility
Lack of PO intake (food or drink) at home x 1 month. DDx includes progression of dementia vs secondary to abdominal pain from choledocholithiasis. Discussed NGT with family, deferring for now and will consider it.  - F/u CT abdomen pelvis  - Nutrition consult  - [place] diet to encourage PO intake  - Family would like to place patient

## 2022-12-20 NOTE — PROGRESS NOTE ADULT - PROBLEM SELECTOR PLAN 4
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    As per admitting physician   - As above, discussed with family. Patient is DNR/DNI.  - MOLST to be placed in chart.  - Low dose Risperidone  - Family requesting dispo to nursing home as they are unable to care for him at home

## 2023-01-01 NOTE — ED PROVIDER NOTE - NS ED MD DISPO DISCHARGE CCDA
Neonatology Daily Progress Note    Boy Maria Reyes is a 4 week old male infant  MRN: [id number]  Gestational Age: 32w0d  Birth weight: 1930 g     DOL: 31 days    PMA: 36w3d    HOSPITAL PROBLEMS:  Principal Problem:    Patent ductus arteriosus  Active Problems:    Slow feeding in     Prematurity, 1,750-1,999 grams, 31-32 completed weeks    VSD (ventricular septal defect)  Resolved Problems:    * No resolved hospital problems. *       INTERVAL EVENTS SINCE PREVIOUS NOTE:  No acute events overnight  Was intermittently tachypneic overnight; other vital signs relatively stable  Remains on 2 L HFNC FiO2 21%  Patient is now s/p PDA closure with brodie ()  Patient tolerating feeds, no emesis reported overnight      PHYSICAL EXAM    Vital signs:     Vital Last Value 24 Hour Range   Temperature 98.6 °F (37 °C) (23 0300) Temp  Min: 98.1 °F (36.7 °C)  Max: 98.8 °F (37.1 °C)   Pulse 133 (23 0600) Pulse  Min: 122  Max: 158   Respiratory 36 (23 06) Resp  Min: 26  Max: 111   Non-Invasive  Blood Pressure (!) 94/45 (23 0300) BP  Min: 62/39  Max: 97/62   Pulse Oximetry 97 % (23) SpO2  Min: 93 %  Max: 100 %   Arterial   Blood Pressure   No data recorded     General: Well appearing, no acute distress, responds to stimuli  HENT: AFSOF, normocephalic, ears normally set, no cleft, palate intact  Neck: supple, full range of motion  CV: RRR, no murmurs, 2+ pulses, good perfusion  Lungs: clear and equal bilaterally, no retractions, no nasal flaring  Abdomen: soft, non-tender, non-distended, no organomegaly  Extremities: negative O/B; FROM x 4  Neuro: good tone  Skin: no rash  : normal for GA; anus patent  Back: no naseem, no dimples      Labs (Last 24 hours)  No results found for this or any previous visit (from the past 24 hour(s)).       ASSESSMENT AND PLAN BY SYSTEM           FLUID ELECTROLYTES NUTRITION     Weight Length Head circumference      Wt Readings from Last 2 Encounters:    23 2460 g (22 %, Z= -0.77)*   23 (!) 2190 g (21 %, Z= -0.80)*     * Growth percentiles are based on Trang (Boys, 22-50 Weeks) data.      current - 18.11\" (46 cm) current - 33 cm (12.99\")   Weight change: -40 g          Dosing Weight: 2300 g          Recent Labs   Lab 23  0534   SODIUM 138   POTASSIUM 4.3   CHLORIDE 105   CO2 27   BUN 11   CREATININE 0.35   GLUCOSE 82   CALCIUM 9.6       POC GLUCOSE:     No results available in last 24 hours    Alkaline Phosphatase:  No results found       INPUT:    IVF:     No IVFs being administered       Feeding:     Mother's or DonorBreast milk Fortified with HMF 24 kcal/oz              40 cc  q3h          GIR:         % PO: 0    Dosing Weight: 2300 g      OUTPUT:    Intake/Output        0700   0659  0700   0659    NG/GT (mL/kg) 320 (130.08)     Total Intake(mL/kg) 320 (130.08)     Net +320           Urine Occurrence 8 x     Stool Occurrence 7 x              Assessment:    infant, slow feeding of , working on feeds and PDA    Plan:  - MBM/DBM with HMF fortified to 24 kcal 40 cc q3h >> increase to 45 cc; PO/NG; all NG for now (TF = 140)  - BMP this am largely wnl; Na 138, no need for supplementation  - Start vitamins   - Lasix 0.5 mg/kg weaned to daily  (s. )    CENTRAL LINE AND CATHETER DISCUSSION     Central Line  Type of Central Line:   GI Tube 11/15/23 Orogastric Oral cavity (Active)     Line Functioning appropriately: N/A  Necessity/Indication: No Central line in place  Continued need for Central Line discussed: N/A 2023      Urinary Catheter  Necessity/Indication: N/A  Continued need for Urinary Catheter discussed: N/A        PAIN/SEDATION     NPASS Pain Score  Min: 0  Max: 0    Pain/Sedation Medications: None in use    Plan:   - continue to monitor N-PASS scores        BILIRUBIN     Assessment:Hyperbilirubinemia of Prematurity  Hyperbilirubinemia s/p Phototherapy  History of hyperbilirubinemia now  resolved    Baby's blood type: B+, magda negative  No results found  Maternal blood type  O +   Information for the patient's mother:  Reyes, Maria [[id number]]   No results found   Antibody:   Information for the patient's mother:  Reyes, Maria [[id number]]   No results found       Last Bilirubin:     No results found     Light level: N/A    Plan:   - No further bilirubin level unless clinically indicated  - Bilirubin PRN    APNEA/BRADYCARDIA/DESATURATION     Assessment: Apnea of Prematurity, resolved    24H Events:     No data found.    Last Significant Event: Per reports, 10/31       -Medications: - No medications    Plan:   - Continue to monitor clinically    RESPIRATORY     Most recent blood gas:    CAPILLARY BLOOD GAS:  Lab Results   Component Value Date/Time    RCPH 7.36 2023 12:08 PM    RCPCO2 45 2023 12:08 PM    RCPO2 54 (H) 2023 12:08 PM    RCHCO3 25 2023 12:08 PM           Mode: High Flow Nasal Canula    HFNC SETTINGS:    SETTING LAST VALUE   FLOW  2 L/min (11/21/23 0600)   FiO2  21 % (11/21/23 0600)         Ventilator days: None  Days on CPAP/HFNC: Yes, s. 11/15   Days on Oxygen: s. 11/15     Assessment: Respiratory distress 2/2 to cardiac defect    Intubation  Necessity/Indication: Not Intubated  Readiness for Extubation discussed: N/A 2023    Plan:   - Continue SpO2 monitoring  - Continue on HFNC at 2 L FiO2 currently at 21%; wean as tolerated  - CXR 11/18 demonstrated persistent mild opacities suggestive of atelectasis and edema  - Lasix 0.5 mg/kg BID (s. 11/8)    CARDIOVASCULAR     Assessment: PDA s/p brodie placement 11/17       ECHO (11/13)  SUMMARY  There is a large PDA with large left-to-right shunt.  It is similar to the previous study.  PDA score 7/10.  There is moderate left atrial enlargement and mild to moderate left ventricular enlargement.  There is retrograde diastolic flow in the descending aorta.  The PDA diameter similar to the branch pulmonary  arteries.  PDA shunt flow velocity is less than 3 m/s.  The LV is hyperdynamic and there is mild to moderate mitral valve insufficiency.  There is no coarctation of the aorta.  There is a small PFO with small left-to-right shunt.  There is either very small muscular VSD or intertrabecular flow with no significant left-to-right shunting.  Compared to previous echo there is a persistent large PDA not significantly changed from prior study.      ECHO (11/18)  SUMMARY  PDA device is in place with no significant residual PDA shunt.  No LPA stenosis.  No coarctation.  There is persistent moderate left atrial enlargement and mild to moderate left ventricular enlargement.  LV ejection fraction by Laird biplane is 55%.  By 2D from the parasternal short axis view the ejection fraction is 55%.  Strain not performed.  Subjectively normal right ventricular systolic function.  RV systolic pressure is estimated at 31 mmHg plus right atrial pressure.    Mild tricuspid regurgitation via at least 2 jets.  The jet of insufficiency is somewhat medial when evaluated from the four-chamber view.  Mild-moderate mitral valve insufficiency.  This is unchanged.  There is a small PFO with small left-to-right shunt.  Muscular VSD described on the previous echocardiogram was not evaluated in detail.     Compared to previous echo there is no PDA.    EKG (11/18)   SUMMARY  Pediatric ECG analysis   Normal sinus rhythm   Left axis deviation   Prolonged QT    Plan:  - Continue to monitor clinically  - Lasix 0.5 mg/kg daily  - Cardiology on consult; appreciate recommendations   - Follow up with Pediatric Cardiology in 1 month   - S/p PDA closure with brodie (11/17)   - S/p ancef for 24 hours post-op   - EKG and Echo results (11/18) reported above        HEMATOLOGY     Assessment: No active hematologic issues    Recent Labs   Lab 11/16/23  1805   WBC 10.3   HGB 14.6   HCT 40.1          Transfusions:   PRBC: None    Platelet: None    FFP: None      Plan:  - monitor clinically    INFECTIOUS DISEASE     Assessment: Observation/Evaluation of  for possible sepsis and  affected by premature rupture of membranes    Recent Labs   Lab 23  1805   WBC 10.3      NRBCRE 0       Microbiology Results     None             - Blood culture and CBC on admission: Yes   - Initial antibiotic course: s/p Ampicillin/Gentamicin x 36 hours       Plan:  - continue to monitor clinically   - Blood culture NGTD (final)    NEUROLOGY     Assessment: No active neurologic concerns    Head Ultrasound Day of LIfe 7: Not indicated  Head Ultrasound Day of Life 28: Not indicated    Plan:   - continue to monitor clinically      OPHTHALMOLOGY     Assessment: No active ophthalmology issues      Plan:   - ROP Exam not indicated    MEDICATIONS     Current Facility-Administered Medications   Medication   • pediatric multivitamin with iron (POLY-VI-SOL WITH IRON) oral solution 0.5 mL   • cholecalciferol (VITAMIN D) 5 mcg (200 units)/0.5 mL oral liquid 5 mcg   • acetaminophen (TYLENOL) 160 MG/5ML suspension 23.04 mg   • furosemide ORAL (LASIX) oral solution 1.2 mg        DISCHARGE TASKS         HEALTH MAINTENANCE AND DISCHARGE PLANNING     Immunizations:   There is no immunization history on file for this patient.  [place] Hearing Test:       ROP Eye Exam Needed?: No (23 0800) No    Car Seat Screen:      CCHD Screening:   Screening complete:    Right hand reading %:    Foot reading %:    CHD:      Circumcision:      [place] State Screen- date drawn (most recent results):    Last 3 results:      Synagis Candidate:      Pediatrician: [facility]    PARENTAL COMMUNICATION     Family present during rounds: Mom updated with  at bedside   Dispo: [facility] to [facility]  Pediatrics, PGY-2  2023   Patient/Caregiver provided printed discharge information.

## 2023-01-01 NOTE — DISCHARGE NOTE PROVIDER - NSDCQMPCI_CARD_ALL_CORE
No Right ear hearing screen completed date: 2023  Right ear screen method: ABR (auditory brainstem response)  Right ear screen result: Passed  Right ear screen comment: N/A    Left ear hearing screen completed date: 2023  Left ear screen method: ABR (auditory brainstem response)  Left ear screen result: Passed  Left ear screen comments: N/A

## 2023-03-10 ENCOUNTER — INPATIENT (INPATIENT)
Facility: HOSPITAL | Age: 88
LOS: 4 days | Discharge: HOME HEALTH | End: 2023-03-15
Attending: SPECIALIST | Admitting: SPECIALIST
Payer: MEDICARE

## 2023-03-10 VITALS
SYSTOLIC BLOOD PRESSURE: 107 MMHG | TEMPERATURE: 97 F | OXYGEN SATURATION: 95 % | HEART RATE: 78 BPM | DIASTOLIC BLOOD PRESSURE: 41 MMHG | RESPIRATION RATE: 18 BRPM

## 2023-03-10 DIAGNOSIS — Z98.89 OTHER SPECIFIED POSTPROCEDURAL STATES: Chronic | ICD-10-CM

## 2023-03-10 DIAGNOSIS — K81.0 ACUTE CHOLECYSTITIS: ICD-10-CM

## 2023-03-10 LAB
ALBUMIN SERPL ELPH-MCNC: 3.3 G/DL — SIGNIFICANT CHANGE UP (ref 3.3–5)
ALP SERPL-CCNC: 141 U/L — HIGH (ref 40–120)
ALT FLD-CCNC: 14 U/L — SIGNIFICANT CHANGE UP (ref 4–41)
ANION GAP SERPL CALC-SCNC: 8 MMOL/L — SIGNIFICANT CHANGE UP (ref 7–14)
AST SERPL-CCNC: 18 U/L — SIGNIFICANT CHANGE UP (ref 4–40)
BASOPHILS # BLD AUTO: 0.01 K/UL — SIGNIFICANT CHANGE UP (ref 0–0.2)
BASOPHILS NFR BLD AUTO: 0.2 % — SIGNIFICANT CHANGE UP (ref 0–2)
BILIRUB SERPL-MCNC: 0.7 MG/DL — SIGNIFICANT CHANGE UP (ref 0.2–1.2)
BUN SERPL-MCNC: 10 MG/DL — SIGNIFICANT CHANGE UP (ref 7–23)
CALCIUM SERPL-MCNC: 9.6 MG/DL — SIGNIFICANT CHANGE UP (ref 8.4–10.5)
CHLORIDE SERPL-SCNC: 103 MMOL/L — SIGNIFICANT CHANGE UP (ref 98–107)
CO2 SERPL-SCNC: 29 MMOL/L — SIGNIFICANT CHANGE UP (ref 22–31)
CREAT SERPL-MCNC: 0.65 MG/DL — SIGNIFICANT CHANGE UP (ref 0.5–1.3)
EGFR: 83 ML/MIN/1.73M2 — SIGNIFICANT CHANGE UP
EOSINOPHIL # BLD AUTO: 0.1 K/UL — SIGNIFICANT CHANGE UP (ref 0–0.5)
EOSINOPHIL NFR BLD AUTO: 1.9 % — SIGNIFICANT CHANGE UP (ref 0–6)
FLUAV AG NPH QL: SIGNIFICANT CHANGE UP
FLUBV AG NPH QL: SIGNIFICANT CHANGE UP
GLUCOSE SERPL-MCNC: 109 MG/DL — HIGH (ref 70–99)
HCT VFR BLD CALC: 48 % — SIGNIFICANT CHANGE UP (ref 39–50)
HGB BLD-MCNC: 15 G/DL — SIGNIFICANT CHANGE UP (ref 13–17)
IANC: 1.82 K/UL — SIGNIFICANT CHANGE UP (ref 1.8–7.4)
IMM GRANULOCYTES NFR BLD AUTO: 0.6 % — SIGNIFICANT CHANGE UP (ref 0–0.9)
LIDOCAIN IGE QN: 8 U/L — SIGNIFICANT CHANGE UP (ref 7–60)
LYMPHOCYTES # BLD AUTO: 3.01 K/UL — SIGNIFICANT CHANGE UP (ref 1–3.3)
LYMPHOCYTES # BLD AUTO: 57.9 % — HIGH (ref 13–44)
MCHC RBC-ENTMCNC: 27.3 PG — SIGNIFICANT CHANGE UP (ref 27–34)
MCHC RBC-ENTMCNC: 31.3 GM/DL — LOW (ref 32–36)
MCV RBC AUTO: 87.3 FL — SIGNIFICANT CHANGE UP (ref 80–100)
MONOCYTES # BLD AUTO: 0.23 K/UL — SIGNIFICANT CHANGE UP (ref 0–0.9)
MONOCYTES NFR BLD AUTO: 4.4 % — SIGNIFICANT CHANGE UP (ref 2–14)
NEUTROPHILS # BLD AUTO: 1.82 K/UL — SIGNIFICANT CHANGE UP (ref 1.8–7.4)
NEUTROPHILS NFR BLD AUTO: 35 % — LOW (ref 43–77)
NRBC # BLD: 0 /100 WBCS — SIGNIFICANT CHANGE UP (ref 0–0)
NRBC # FLD: 0 K/UL — SIGNIFICANT CHANGE UP (ref 0–0)
PLATELET # BLD AUTO: 309 K/UL — SIGNIFICANT CHANGE UP (ref 150–400)
POTASSIUM SERPL-MCNC: 5 MMOL/L — SIGNIFICANT CHANGE UP (ref 3.5–5.3)
POTASSIUM SERPL-SCNC: 5 MMOL/L — SIGNIFICANT CHANGE UP (ref 3.5–5.3)
PROT SERPL-MCNC: 7.5 G/DL — SIGNIFICANT CHANGE UP (ref 6–8.3)
RBC # BLD: 5.5 M/UL — SIGNIFICANT CHANGE UP (ref 4.2–5.8)
RBC # FLD: 18.2 % — HIGH (ref 10.3–14.5)
RSV RNA NPH QL NAA+NON-PROBE: SIGNIFICANT CHANGE UP
SARS-COV-2 RNA SPEC QL NAA+PROBE: SIGNIFICANT CHANGE UP
SODIUM SERPL-SCNC: 140 MMOL/L — SIGNIFICANT CHANGE UP (ref 135–145)
WBC # BLD: 5.2 K/UL — SIGNIFICANT CHANGE UP (ref 3.8–10.5)
WBC # FLD AUTO: 5.2 K/UL — SIGNIFICANT CHANGE UP (ref 3.8–10.5)

## 2023-03-10 PROCEDURE — 99285 EMERGENCY DEPT VISIT HI MDM: CPT | Mod: GC

## 2023-03-10 PROCEDURE — 99222 1ST HOSP IP/OBS MODERATE 55: CPT | Mod: GC

## 2023-03-10 PROCEDURE — 74177 CT ABD & PELVIS W/CONTRAST: CPT | Mod: 26,MA

## 2023-03-10 RX ORDER — ENOXAPARIN SODIUM 100 MG/ML
40 INJECTION SUBCUTANEOUS EVERY 24 HOURS
Refills: 0 | Status: DISCONTINUED | OUTPATIENT
Start: 2023-03-10 | End: 2023-03-10

## 2023-03-10 RX ORDER — RISPERIDONE 4 MG/1
0.5 TABLET ORAL AT BEDTIME
Refills: 0 | Status: DISCONTINUED | OUTPATIENT
Start: 2023-03-10 | End: 2023-03-15

## 2023-03-10 RX ORDER — DOCUSATE SODIUM 100 MG
2 CAPSULE ORAL
Qty: 0 | Refills: 0 | DISCHARGE

## 2023-03-10 RX ORDER — CARVEDILOL PHOSPHATE 80 MG/1
3.12 CAPSULE, EXTENDED RELEASE ORAL EVERY 12 HOURS
Refills: 0 | Status: DISCONTINUED | OUTPATIENT
Start: 2023-03-10 | End: 2023-03-10

## 2023-03-10 RX ORDER — ACETAMINOPHEN 500 MG
975 TABLET ORAL EVERY 6 HOURS
Refills: 0 | Status: DISCONTINUED | OUTPATIENT
Start: 2023-03-10 | End: 2023-03-15

## 2023-03-10 RX ORDER — PIPERACILLIN AND TAZOBACTAM 4; .5 G/20ML; G/20ML
3.38 INJECTION, POWDER, LYOPHILIZED, FOR SOLUTION INTRAVENOUS EVERY 8 HOURS
Refills: 0 | Status: DISCONTINUED | OUTPATIENT
Start: 2023-03-11 | End: 2023-03-15

## 2023-03-10 RX ORDER — SODIUM CHLORIDE 9 MG/ML
1000 INJECTION, SOLUTION INTRAVENOUS
Refills: 0 | Status: DISCONTINUED | OUTPATIENT
Start: 2023-03-10 | End: 2023-03-12

## 2023-03-10 RX ORDER — SENNA PLUS 8.6 MG/1
2 TABLET ORAL AT BEDTIME
Refills: 0 | Status: DISCONTINUED | OUTPATIENT
Start: 2023-03-10 | End: 2023-03-15

## 2023-03-10 RX ORDER — ENOXAPARIN SODIUM 100 MG/ML
30 INJECTION SUBCUTANEOUS EVERY 24 HOURS
Refills: 0 | Status: DISCONTINUED | OUTPATIENT
Start: 2023-03-10 | End: 2023-03-15

## 2023-03-10 RX ORDER — LANOLIN ALCOHOL/MO/W.PET/CERES
6 CREAM (GRAM) TOPICAL AT BEDTIME
Refills: 0 | Status: DISCONTINUED | OUTPATIENT
Start: 2023-03-10 | End: 2023-03-15

## 2023-03-10 RX ORDER — FAMOTIDINE 10 MG/ML
20 INJECTION INTRAVENOUS ONCE
Refills: 0 | Status: COMPLETED | OUTPATIENT
Start: 2023-03-10 | End: 2023-03-10

## 2023-03-10 RX ORDER — PIPERACILLIN AND TAZOBACTAM 4; .5 G/20ML; G/20ML
3.38 INJECTION, POWDER, LYOPHILIZED, FOR SOLUTION INTRAVENOUS ONCE
Refills: 0 | Status: COMPLETED | OUTPATIENT
Start: 2023-03-10 | End: 2023-03-10

## 2023-03-10 RX ORDER — CARVEDILOL PHOSPHATE 80 MG/1
3.12 CAPSULE, EXTENDED RELEASE ORAL EVERY 12 HOURS
Refills: 0 | Status: DISCONTINUED | OUTPATIENT
Start: 2023-03-10 | End: 2023-03-15

## 2023-03-10 RX ORDER — SODIUM CHLORIDE 9 MG/ML
1000 INJECTION, SOLUTION INTRAVENOUS
Refills: 0 | Status: DISCONTINUED | OUTPATIENT
Start: 2023-03-10 | End: 2023-03-10

## 2023-03-10 RX ORDER — TAMSULOSIN HYDROCHLORIDE 0.4 MG/1
0.4 CAPSULE ORAL AT BEDTIME
Refills: 0 | Status: DISCONTINUED | OUTPATIENT
Start: 2023-03-10 | End: 2023-03-15

## 2023-03-10 RX ADMIN — FAMOTIDINE 20 MILLIGRAM(S): 10 INJECTION INTRAVENOUS at 17:37

## 2023-03-10 RX ADMIN — PIPERACILLIN AND TAZOBACTAM 200 GRAM(S): 4; .5 INJECTION, POWDER, LYOPHILIZED, FOR SOLUTION INTRAVENOUS at 19:48

## 2023-03-10 NOTE — ED PROVIDER NOTE - PROGRESS NOTE DETAILS
ZO Pink PGY2 CT read with evidence of cholecystitis. called his daughter for goals of care.     Saray Owens: [phone] ZO Pink PGY2 CT read with evidence of cholecystitis. called his daughter for goals of care; prefers to have this discussion with the daughter who lives with him.      Saray Owens: [phone] [place] PGY-2: Surgery consulted. Spoke w/ Audrey and Tete lucila (patient's daughters) who decided against any surgical intervention however are interested in evaluation by IR for possible perc kinga. Will admit to medicine.

## 2023-03-10 NOTE — H&P ADULT - NSHPLABSRESULTS_GEN_ALL_CORE
LABS:                          15.0   5.20  )-----------( 309      ( 10 Mar 2023 18:00 )             48.0       03-10    140  |  103  |  10  ----------------------------<  109<H>  5.0   |  29  |  0.65    Ca    9.6      10 Mar 2023 18:00    TPro  7.5  /  Alb  3.3  /  TBili  0.7  /  DBili  x   /  AST  18  /  ALT  14  /  AlkPhos  141<H>  03-10            _______________________________________  RADIOLOGY & ADDITIONAL STUDIES:  < from: CT Abdomen and Pelvis w/ IV Cont (03.10.23 @ 18:07) >    IMPRESSION:  Findings compatible with acute cholecystitis. No drainable   pericholecystic abscess.    < end of copied text >

## 2023-03-10 NOTE — ED PROVIDER NOTE - PHYSICAL EXAMINATION
VITALS:   T(C): 36.1 (03-10-23 @ 19:08), Max: 36.5 (03-10-23 @ 15:04)  HR: 75 (03-10-23 @ 19:08) (75 - 95)  BP: 120/60 (03-10-23 @ 19:08) (107/41 - 120/60)  RR: 16 (03-10-23 @ 19:08) (16 - 18)  SpO2: 95% (03-10-23 @ 19:08) (95% - 100%)    GENERAL: NAD, lying in bed comfortably  HEAD:  Atraumatic, Normocephalic  NECK: Supple, No JVD  CHEST/LUNG: CTABL; No rales, rhonchi, wheezing, or rubs. Unlabored respirations  HEART: RRR. No M/R/G  ABDOMEN: Mild TTP on RUQ negative Soriano's sign  EXTREMITIES:  2+ Peripheral Pulses, brisk capillary refill. No clubbing, cyanosis, or edema  NERVOUS SYSTEM:  Alert & Oriented X1  PSYCH: Calm  SKIN: No rashes or lesions

## 2023-03-10 NOTE — ED PROVIDER NOTE - ATTENDING CONTRIBUTION TO CARE
The patient is a 89y Male who has a past medical and surgery history of  Hypertension Depression Dementia HLD OA Cholelithiasis A-fib On Eliquis DVT (IVC filter) PTED with worsening right sided ab pain x 2 weeks. PMH asthma, HTN, dementia, blood clotting   Vital Signs Last 24 Hrs  T(F): 97.4 HR: 78 BP: 107/41 RR: 18 SpO2: 95% (10 Mar 2023 13:52)   PE: as described; my additions and exceptions are noted in the chart    DATA:  EKG: pending at time of evaluation  LAB: Pending at time of evaluation    IMPRESSION/RISK:  Dx= abdominal pain right side in elderly female w/ dementia    Consideration include important to r/o stx dz i.e. Appy vs kinga; h/o cholecystitis   Plan  preop labs  CT   analgesics   rectal temp   antibiotics if febrile   dispo as per results and response

## 2023-03-10 NOTE — H&P ADULT - ASSESSMENT
89yo M DNR/DNI with h/o dementia (AOx1), HTN, afib (on Eliquis), DVT, h/o cholecystitis s/p IR perc (2019) and choledocholithiasis presenting for abd pain with acute cholecystitis.    Plan:  - Admit to B team surgery under Dr. Blandon, floor bed  - NPO/IVF  - Zosyn  - Confirmed DNR/DNI status with daughter, however they are agreeable to IR eval if needed. Patient not a surgical candidate and daughter would not want surgery for him.  - Will attempt nonop management with antibiotics and reassess need for perc kinga if patient does not respond  - Resume home meds  - Hold home Eliquis    D/w Dr. Barber Melgoza, PGY3  B Team Surgery   [phone]

## 2023-03-10 NOTE — H&P ADULT - NSHPPHYSICALEXAM_GEN_ALL_CORE
VITALS:  Vital Signs Last 24 Hrs  T(C): 36.1 (10 Mar 2023 19:08), Max: 36.5 (10 Mar 2023 15:04)  T(F): 97 (10 Mar 2023 19:08), Max: 97.7 (10 Mar 2023 15:04)  HR: 75 (10 Mar 2023 19:08) (75 - 95)  BP: 120/60 (10 Mar 2023 19:08) (107/41 - 120/60)  BP(mean): --  RR: 16 (10 Mar 2023 19:08) (16 - 18)  SpO2: 95% (10 Mar 2023 19:08) (95% - 100%)    Parameters below as of 10 Mar 2023 19:08  Patient On (Oxygen Delivery Method): room air        PHYSICAL EXAM:  Gen: No acute distress, calm AOx1  Abd: Soft, nontender, nondistended, no rebound, no guarding, negative Soriano's sign  Ext: Warm and well-perfused

## 2023-03-10 NOTE — ED PROVIDER NOTE - OBJECTIVE STATEMENT
89 y/o M PMH MHx afib on Eliquis, PVD d/t prior DVTs, dementia (A&Ox1 at baseline), HTN, kidney stones, choledocholithiasis, presenting w/ abdominal pain. Patient reports no nausea, vomiting, fevers, chills but reports spitting up. Pain has been worst today w/ patient screaming in pain. Has a hx of cholecystitis s/p perc kinga in 2019. 89 y/o M PMH MHx afib on Eliquis, PVD d/t prior DVTs, dementia (A&Ox1 at baseline), HTN, kidney stones, choledocholithiasis, presenting w/ abdominal pain. Patient reports no nausea, vomiting, fevers, chills but reports spitting up. Pain has been worst today w/ patient screaming in pain. Has a hx of cholecystitis s/p perc kinga in 2019.    Family contact info: [phone], [phone]

## 2023-03-10 NOTE — ED ADULT NURSE NOTE - OBJECTIVE STATEMENT
pt. received to room 26 A&Ox1 (self) p/w abdominal pain. pt. poor historian, endorses abdominal pain "for a couple of days". denies abdominal pain at this time. Endorses difficulty defecating and blood in the stool "once in a while" abdomen is firm but non-tender. NAD noted. respirations even and unlabored on RA. pending MD Deutsch. comfort measures provided. safety precautions maintained.
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
## 2023-03-10 NOTE — ED ADULT NURSE REASSESSMENT NOTE - NS ED NURSE REASSESS COMMENT FT1
USIV access obtained by MD Pink. Labs drawn and sent. due meds given. pending CT. comfort measures and safety precautions maintained.

## 2023-03-10 NOTE — ED PROVIDER NOTE - CLINICAL SUMMARY MEDICAL DECISION MAKING FREE TEXT BOX
89 y/o M PMH MHx afib on Eliquis, PVD d/t prior DVTs, dementia (A&Ox1 at baseline), HTN, kidney stones, choledocholithiasis, presenting w/ abdominal pain 2/2 acute cholecystitis. Called family who are not interested in surgery but are interested in eval for perc kinga. Will admit to medicine for medical management of acute kinga and IR eval.

## 2023-03-10 NOTE — H&P ADULT - ATTENDING COMMENTS
The History was reviewed, the patient was examined. Responds intermittently to simple questions.  Physical Exam: Vital signs are stable. Afebrile.  Abdomen: Soft, mild distention. Tenderness to palpation over the right upper quadrant.  Labs: As charted.  Plan: Continue present management.

## 2023-03-10 NOTE — H&P ADULT - HISTORY OF PRESENT ILLNESS
89yo M DNR/DNI with h/o dementia (AOx1), HTN, afib (on Eliquis), DVT, h/o cholecystitis s/p IR perc (2019) and choledocholithiasis presenting for abd pain. Per ED provider and daughter, patient with vague abd pain and poor PO for several denies. No fevers or chills. Was brought to ED for further eval.    In the ED, afebrile. WBC 5.2, lfts and tibili normal. Lipase 8. CT with gallstones and mild edema with small amount of pericholecystic fluid and stranding.

## 2023-03-10 NOTE — ED ADULT NURSE NOTE - NSFALLRSKASSESSTYPE_ED_ALL_ED
32 y/o female  s/p emergent C section 4 weeks ago due to refractory aflutter, s/p ECMO, presents for evaluation of right sided chest pain.  As per the patient she was treated at [facility] with ECMO 4 weeks ago due to the above past medical history.  She has since been resting and sedentary with good follow up with her Cardiologist from [facility].  As per the patient she had right sided pleuritic chest pain that started 1 hour before presentation to the hospital.  She describes that she has some radiation of the pain to the neck and that the pain is not associated with any numbness or tingling of the extremities.  She explains that the pain is 6/10 at its worse and only felt with inspiration however after receiving toradol in the ED the pain has dissipated completely.  The patient denies cough, fever, recent sob, abd pain, sick contacts and lower extremity swelling.  Given the nature of the pain and the patients pmh CT angio was ordered to rule out PE which was negative and EKG and trops were negative on initial work up.  Repeat cardiac enzymes negative. CT agio did not show significant pleural effusion on the right; not the cause for her presentation. Patient requesting toradol for pain. Cardiac workup negative, neck pain is MSK in origin. Initial (On Arrival) 30 y/o female  s/p emergent C section 4 weeks ago due to refractory aflutter, s/p ECMO, presents for evaluation of right sided chest pain.  As per the patient she was treated at [facility] with ECMO 4 weeks ago due to the above past medical history.  She has since been resting and sedentary with good follow up with her Cardiologist from [facility].  As per the patient she had right sided pleuritic chest pain that started 1 hour before presentation to the hospital.  She describes that she has some radiation of the pain to the neck and that the pain is not associated with any numbness or tingling of the extremities.  She explains that the pain is 6/10 at its worse and only felt with inspiration however after receiving toradol in the ED the pain has dissipated completely.  The patient denies cough, fever, recent sob, abd pain, sick contacts and lower extremity swelling.  Given the nature of the pain and the patients pmh CT angio was ordered to rule out PE which was negative and EKG and trops were negative on initial work up.  Repeat cardiac enzymes negative. CT agio did not show significant pleural effusion on the right; not the cause for her presentation.    On  lower extremities doppler was performed and rule out DVT.    A VQ scan was done showing Wedge-shaped peripheral opacity  seen in the right lower lobe on CT chest, there is corresponding  matched perfusion and ventilation abnormalities demonstrated. Intermediate probability for embolism. Wedge shape opacity may represent     pulmonary infarct.    Patient was then evaluated by pulmonary service, and recommend to treat for a likely for a small PE with eliquis for 3 months.    Patient was also seen by cardiology service, LV EF stable, continue with metoprolol. 30 y/o female  s/p emergent C section 4 weeks ago due to refractory aflutter, s/p ECMO, presents for evaluation of right sided chest pain.  As per the patient she was treated at [facility] with ECMO 4 weeks ago due to the above past medical history.  She has since been resting and sedentary with good follow up with her Cardiologist from [facility].  As per the patient she had right sided pleuritic chest pain that started 1 hour before presentation to the hospital.  She describes that she has some radiation of the pain to the neck and that the pain is not associated with any numbness or tingling of the extremities.  She explains that the pain is 6/10 at its worse and only felt with inspiration however after receiving toradol in the ED the pain has dissipated completely.  The patient denies cough, fever, recent sob, abd pain, sick contacts and lower extremity swelling.  Given the nature of the pain and the patients pmh CT angio was ordered to rule out PE which was negative and EKG and trops were negative on initial work up.  Repeat cardiac enzymes negative. CT agio did not show significant pleural effusion on the right; not the cause for her presentation.    On  lower extremities doppler was performed and rule out DVT.    A VQ scan was done showing Wedge-shaped peripheral opacity  seen in the right lower lobe on CT chest, there is corresponding  matched perfusion and ventilation abnormalities demonstrated. Intermediate probability for embolism. Wedge shape opacity may represent     pulmonary infarct.    Patient was then evaluated by pulmonary service, and recommend to treat for a likely for a small PE with eliquis for 3 months.    Patient was also seen by cardiology service, LV EF stable, continue with metoprolol. Can resume hydralazine if BP>140. Do not recommend ACE inh given borderline BP and breast feeding    Patient also developed 10 beats of Vtach thus EP service consulted: given low EF, they recommend life vest.

## 2023-03-11 LAB
ALBUMIN SERPL ELPH-MCNC: 3.2 G/DL — LOW (ref 3.3–5)
ALP SERPL-CCNC: 129 U/L — HIGH (ref 40–120)
ALT FLD-CCNC: 10 U/L — SIGNIFICANT CHANGE UP (ref 4–41)
ANION GAP SERPL CALC-SCNC: 10 MMOL/L — SIGNIFICANT CHANGE UP (ref 7–14)
AST SERPL-CCNC: 29 U/L — SIGNIFICANT CHANGE UP (ref 4–40)
BILIRUB DIRECT SERPL-MCNC: <0.2 MG/DL — SIGNIFICANT CHANGE UP (ref 0–0.3)
BILIRUB INDIRECT FLD-MCNC: >0.6 MG/DL — SIGNIFICANT CHANGE UP (ref 0–1)
BILIRUB SERPL-MCNC: 0.8 MG/DL — SIGNIFICANT CHANGE UP (ref 0.2–1.2)
BUN SERPL-MCNC: 12 MG/DL — SIGNIFICANT CHANGE UP (ref 7–23)
CALCIUM SERPL-MCNC: 9.3 MG/DL — SIGNIFICANT CHANGE UP (ref 8.4–10.5)
CHLORIDE SERPL-SCNC: 103 MMOL/L — SIGNIFICANT CHANGE UP (ref 98–107)
CO2 SERPL-SCNC: 27 MMOL/L — SIGNIFICANT CHANGE UP (ref 22–31)
CREAT SERPL-MCNC: 0.68 MG/DL — SIGNIFICANT CHANGE UP (ref 0.5–1.3)
EGFR: 82 ML/MIN/1.73M2 — SIGNIFICANT CHANGE UP
GLUCOSE SERPL-MCNC: 85 MG/DL — SIGNIFICANT CHANGE UP (ref 70–99)
HCT VFR BLD CALC: 47.3 % — SIGNIFICANT CHANGE UP (ref 39–50)
HGB BLD-MCNC: 15 G/DL — SIGNIFICANT CHANGE UP (ref 13–17)
MAGNESIUM SERPL-MCNC: 2 MG/DL — SIGNIFICANT CHANGE UP (ref 1.6–2.6)
MCHC RBC-ENTMCNC: 27.3 PG — SIGNIFICANT CHANGE UP (ref 27–34)
MCHC RBC-ENTMCNC: 31.7 GM/DL — LOW (ref 32–36)
MCV RBC AUTO: 86.2 FL — SIGNIFICANT CHANGE UP (ref 80–100)
NRBC # BLD: 0 /100 WBCS — SIGNIFICANT CHANGE UP (ref 0–0)
NRBC # FLD: 0 K/UL — SIGNIFICANT CHANGE UP (ref 0–0)
PHOSPHATE SERPL-MCNC: 3.5 MG/DL — SIGNIFICANT CHANGE UP (ref 2.5–4.5)
PLATELET # BLD AUTO: 312 K/UL — SIGNIFICANT CHANGE UP (ref 150–400)
POTASSIUM SERPL-MCNC: 5.3 MMOL/L — SIGNIFICANT CHANGE UP (ref 3.5–5.3)
POTASSIUM SERPL-SCNC: 5.3 MMOL/L — SIGNIFICANT CHANGE UP (ref 3.5–5.3)
PROT SERPL-MCNC: 7 G/DL — SIGNIFICANT CHANGE UP (ref 6–8.3)
RBC # BLD: 5.49 M/UL — SIGNIFICANT CHANGE UP (ref 4.2–5.8)
RBC # FLD: 18.2 % — HIGH (ref 10.3–14.5)
SODIUM SERPL-SCNC: 140 MMOL/L — SIGNIFICANT CHANGE UP (ref 135–145)
WBC # BLD: 4.71 K/UL — SIGNIFICANT CHANGE UP (ref 3.8–10.5)
WBC # FLD AUTO: 4.71 K/UL — SIGNIFICANT CHANGE UP (ref 3.8–10.5)

## 2023-03-11 PROCEDURE — 93010 ELECTROCARDIOGRAM REPORT: CPT

## 2023-03-11 RX ORDER — RISPERIDONE 4 MG/1
0.5 TABLET ORAL ONCE
Refills: 0 | Status: COMPLETED | OUTPATIENT
Start: 2023-03-11 | End: 2023-03-11

## 2023-03-11 RX ADMIN — CARVEDILOL PHOSPHATE 3.12 MILLIGRAM(S): 80 CAPSULE, EXTENDED RELEASE ORAL at 07:49

## 2023-03-11 RX ADMIN — Medication 0.5 MILLIGRAM(S): at 10:16

## 2023-03-11 RX ADMIN — SENNA PLUS 2 TABLET(S): 8.6 TABLET ORAL at 21:33

## 2023-03-11 RX ADMIN — RISPERIDONE 0.5 MILLIGRAM(S): 4 TABLET ORAL at 21:33

## 2023-03-11 RX ADMIN — Medication 975 MILLIGRAM(S): at 00:43

## 2023-03-11 RX ADMIN — Medication 975 MILLIGRAM(S): at 08:15

## 2023-03-11 RX ADMIN — PIPERACILLIN AND TAZOBACTAM 25 GRAM(S): 4; .5 INJECTION, POWDER, LYOPHILIZED, FOR SOLUTION INTRAVENOUS at 18:56

## 2023-03-11 RX ADMIN — Medication 6 MILLIGRAM(S): at 21:34

## 2023-03-11 RX ADMIN — CARVEDILOL PHOSPHATE 3.12 MILLIGRAM(S): 80 CAPSULE, EXTENDED RELEASE ORAL at 18:56

## 2023-03-11 RX ADMIN — RISPERIDONE 0.5 MILLIGRAM(S): 4 TABLET ORAL at 00:43

## 2023-03-11 RX ADMIN — Medication 975 MILLIGRAM(S): at 07:49

## 2023-03-11 RX ADMIN — TAMSULOSIN HYDROCHLORIDE 0.4 MILLIGRAM(S): 0.4 CAPSULE ORAL at 21:33

## 2023-03-11 RX ADMIN — Medication 975 MILLIGRAM(S): at 18:57

## 2023-03-11 RX ADMIN — SODIUM CHLORIDE 100 MILLILITER(S): 9 INJECTION, SOLUTION INTRAVENOUS at 11:12

## 2023-03-11 RX ADMIN — PIPERACILLIN AND TAZOBACTAM 25 GRAM(S): 4; .5 INJECTION, POWDER, LYOPHILIZED, FOR SOLUTION INTRAVENOUS at 00:43

## 2023-03-11 RX ADMIN — Medication 975 MILLIGRAM(S): at 01:13

## 2023-03-11 RX ADMIN — SODIUM CHLORIDE 100 MILLILITER(S): 9 INJECTION, SOLUTION INTRAVENOUS at 19:03

## 2023-03-11 RX ADMIN — Medication 975 MILLIGRAM(S): at 19:27

## 2023-03-11 RX ADMIN — ENOXAPARIN SODIUM 30 MILLIGRAM(S): 100 INJECTION SUBCUTANEOUS at 18:57

## 2023-03-11 RX ADMIN — PIPERACILLIN AND TAZOBACTAM 25 GRAM(S): 4; .5 INJECTION, POWDER, LYOPHILIZED, FOR SOLUTION INTRAVENOUS at 11:12

## 2023-03-11 RX ADMIN — Medication 975 MILLIGRAM(S): at 14:22

## 2023-03-11 RX ADMIN — Medication 975 MILLIGRAM(S): at 13:52

## 2023-03-11 NOTE — PHYSICAL THERAPY INITIAL EVALUATION ADULT - PERTINENT HX OF CURRENT PROBLEM, REHAB EVAL
89yo M DNR/DNI with h/o dementia (AOx1), HTN, afib (on Eliquis), DVT, h/o cholecystitis s/p IR perc (2019) and choledocholithiasis presenting for abd pain. Per ED provider and daughter, patient with vague abd pain and poor PO for several denies. No fevers or chills. Was brought to ED for further eval.

## 2023-03-11 NOTE — PHYSICAL THERAPY INITIAL EVALUATION ADULT - LEVEL OF INDEPENDENCE: SIT/STAND, REHAB EVAL
Pt unable to maintain upright standing position; unable to bring hips forward/maximum assist (25% patients effort)

## 2023-03-11 NOTE — PHYSICAL THERAPY INITIAL EVALUATION ADULT - GENERAL OBSERVATIONS, REHAB EVAL
Pt received semi-supine in bed, with all lines intact, NAD, all precautions maintained. Nursing staff present in room.

## 2023-03-11 NOTE — PHYSICAL THERAPY INITIAL EVALUATION ADULT - ADDITIONAL COMMENTS
As per old care coordination notes,  Please follow for updated care coordination notes. As per old care coordination notes, pt lives with his family in a hosue and has 6 steps to enter, 15 steps inside. Pt owns a cane, rolling walker, and wheelchair. Please follow care coordination notes for updated information.   Post PT evaluation, pt left semi-supine, alarm on, call bell and remote within reach, all precautions maintained, NAD, nursing staff present in room. CHARLENE Proctor aware.

## 2023-03-12 LAB
ANION GAP SERPL CALC-SCNC: 14 MMOL/L — SIGNIFICANT CHANGE UP (ref 7–14)
BUN SERPL-MCNC: 14 MG/DL — SIGNIFICANT CHANGE UP (ref 7–23)
CALCIUM SERPL-MCNC: 8.8 MG/DL — SIGNIFICANT CHANGE UP (ref 8.4–10.5)
CHLORIDE SERPL-SCNC: 105 MMOL/L — SIGNIFICANT CHANGE UP (ref 98–107)
CO2 SERPL-SCNC: 19 MMOL/L — LOW (ref 22–31)
CREAT SERPL-MCNC: 0.78 MG/DL — SIGNIFICANT CHANGE UP (ref 0.5–1.3)
EGFR: 79 ML/MIN/1.73M2 — SIGNIFICANT CHANGE UP
GLUCOSE SERPL-MCNC: 93 MG/DL — SIGNIFICANT CHANGE UP (ref 70–99)
HCT VFR BLD CALC: 42.8 % — SIGNIFICANT CHANGE UP (ref 39–50)
HGB BLD-MCNC: 13.7 G/DL — SIGNIFICANT CHANGE UP (ref 13–17)
MAGNESIUM SERPL-MCNC: 1.9 MG/DL — SIGNIFICANT CHANGE UP (ref 1.6–2.6)
MCHC RBC-ENTMCNC: 27.2 PG — SIGNIFICANT CHANGE UP (ref 27–34)
MCHC RBC-ENTMCNC: 32 GM/DL — SIGNIFICANT CHANGE UP (ref 32–36)
MCV RBC AUTO: 85.1 FL — SIGNIFICANT CHANGE UP (ref 80–100)
NRBC # BLD: 0 /100 WBCS — SIGNIFICANT CHANGE UP (ref 0–0)
NRBC # FLD: 0 K/UL — SIGNIFICANT CHANGE UP (ref 0–0)
PHOSPHATE SERPL-MCNC: 3.6 MG/DL — SIGNIFICANT CHANGE UP (ref 2.5–4.5)
PLATELET # BLD AUTO: 305 K/UL — SIGNIFICANT CHANGE UP (ref 150–400)
POTASSIUM SERPL-MCNC: 4.2 MMOL/L — SIGNIFICANT CHANGE UP (ref 3.5–5.3)
POTASSIUM SERPL-SCNC: 4.2 MMOL/L — SIGNIFICANT CHANGE UP (ref 3.5–5.3)
RBC # BLD: 5.03 M/UL — SIGNIFICANT CHANGE UP (ref 4.2–5.8)
RBC # FLD: 17.3 % — HIGH (ref 10.3–14.5)
SODIUM SERPL-SCNC: 138 MMOL/L — SIGNIFICANT CHANGE UP (ref 135–145)
WBC # BLD: 4.7 K/UL — SIGNIFICANT CHANGE UP (ref 3.8–10.5)
WBC # FLD AUTO: 4.7 K/UL — SIGNIFICANT CHANGE UP (ref 3.8–10.5)

## 2023-03-12 PROCEDURE — 99232 SBSQ HOSP IP/OBS MODERATE 35: CPT | Mod: GC

## 2023-03-12 RX ORDER — MAGNESIUM SULFATE 500 MG/ML
2 VIAL (ML) INJECTION ONCE
Refills: 0 | Status: COMPLETED | OUTPATIENT
Start: 2023-03-12 | End: 2023-03-12

## 2023-03-12 RX ORDER — SODIUM CHLORIDE 9 MG/ML
1000 INJECTION, SOLUTION INTRAVENOUS
Refills: 0 | Status: DISCONTINUED | OUTPATIENT
Start: 2023-03-12 | End: 2023-03-13

## 2023-03-12 RX ADMIN — SODIUM CHLORIDE 50 MILLILITER(S): 9 INJECTION, SOLUTION INTRAVENOUS at 21:22

## 2023-03-12 RX ADMIN — TAMSULOSIN HYDROCHLORIDE 0.4 MILLIGRAM(S): 0.4 CAPSULE ORAL at 21:22

## 2023-03-12 RX ADMIN — Medication 975 MILLIGRAM(S): at 13:32

## 2023-03-12 RX ADMIN — Medication 975 MILLIGRAM(S): at 18:50

## 2023-03-12 RX ADMIN — ENOXAPARIN SODIUM 30 MILLIGRAM(S): 100 INJECTION SUBCUTANEOUS at 18:50

## 2023-03-12 RX ADMIN — Medication 975 MILLIGRAM(S): at 14:02

## 2023-03-12 RX ADMIN — Medication 975 MILLIGRAM(S): at 23:50

## 2023-03-12 RX ADMIN — Medication 975 MILLIGRAM(S): at 08:01

## 2023-03-12 RX ADMIN — SENNA PLUS 2 TABLET(S): 8.6 TABLET ORAL at 21:22

## 2023-03-12 RX ADMIN — PIPERACILLIN AND TAZOBACTAM 25 GRAM(S): 4; .5 INJECTION, POWDER, LYOPHILIZED, FOR SOLUTION INTRAVENOUS at 18:50

## 2023-03-12 RX ADMIN — Medication 975 MILLIGRAM(S): at 00:45

## 2023-03-12 RX ADMIN — SODIUM CHLORIDE 100 MILLILITER(S): 9 INJECTION, SOLUTION INTRAVENOUS at 05:45

## 2023-03-12 RX ADMIN — PIPERACILLIN AND TAZOBACTAM 25 GRAM(S): 4; .5 INJECTION, POWDER, LYOPHILIZED, FOR SOLUTION INTRAVENOUS at 11:46

## 2023-03-12 RX ADMIN — Medication 975 MILLIGRAM(S): at 19:41

## 2023-03-12 RX ADMIN — Medication 6 MILLIGRAM(S): at 21:22

## 2023-03-12 RX ADMIN — PIPERACILLIN AND TAZOBACTAM 25 GRAM(S): 4; .5 INJECTION, POWDER, LYOPHILIZED, FOR SOLUTION INTRAVENOUS at 03:22

## 2023-03-12 RX ADMIN — Medication 25 GRAM(S): at 13:30

## 2023-03-12 RX ADMIN — Medication 975 MILLIGRAM(S): at 01:15

## 2023-03-12 RX ADMIN — Medication 975 MILLIGRAM(S): at 08:31

## 2023-03-12 RX ADMIN — RISPERIDONE 0.5 MILLIGRAM(S): 4 TABLET ORAL at 21:22

## 2023-03-12 NOTE — PROVIDER CONTACT NOTE (OTHER) - ASSESSMENT
Pt agitated, on constant observation, is not letting us put a new IV
temp is 94.6 oral
temp is 95.6 rectal
pt VSS and currently asymptomatic
oral temp 94.5, hyperthemia blanket applied around 4:30pm

## 2023-03-12 NOTE — PROVIDER CONTACT NOTE (OTHER) - SITUATION
oral temp 94.5
temp is 94.6 oral
temp is 95.6 rectal
Pt Clifton Owens pulled out his IV, pt is not letting us put in a new IV, team notified
Pt is DNR, no MOLST form in the patients chart

## 2023-03-12 NOTE — PROGRESS NOTE ADULT - ATTENDING COMMENTS
Above noted.   Physical Exam: Afebrile.  Abdomen: Soft, non-tender.  Labs: As charted.  Plan: Advance diet if he remains pain free. Continue antibiotic.

## 2023-03-12 NOTE — PROVIDER CONTACT NOTE (OTHER) - REASON
oral temp 94.5
temp is 95.6 rectal
Pt does not have an IV Access
Pt is DNR, no MOLST in the patients chart
temp is 94.6 oral

## 2023-03-12 NOTE — PROVIDER CONTACT NOTE (OTHER) - BACKGROUND
Pt admitted for acute kinga
acute cholecystitis, h/o A fib, DVT, dementia, HLD
acute cholecystitis, h/o A-fib, DVT, OA, HLD, dementia
pt admitted for acute cholecystitis
acute cholecystitis, h/o A fib, DVT, HLD, dementia

## 2023-03-12 NOTE — PROVIDER CONTACT NOTE (OTHER) - ACTION/TREATMENT ORDERED:
provider made aware, will assess temperature again at 8pm
Provider notified, will discuss with team regarding need for restraints possibly, will reattempt IV later
provider aware. will complete when family visits patient
provider made aware, will continue to monitor
provider made aware, will put in order for temperature

## 2023-03-13 LAB
ALBUMIN SERPL ELPH-MCNC: 2.5 G/DL — LOW (ref 3.3–5)
ALP SERPL-CCNC: 96 U/L — SIGNIFICANT CHANGE UP (ref 40–120)
ALT FLD-CCNC: 7 U/L — SIGNIFICANT CHANGE UP (ref 4–41)
ANION GAP SERPL CALC-SCNC: 11 MMOL/L — SIGNIFICANT CHANGE UP (ref 7–14)
AST SERPL-CCNC: 17 U/L — SIGNIFICANT CHANGE UP (ref 4–40)
BILIRUB SERPL-MCNC: 0.7 MG/DL — SIGNIFICANT CHANGE UP (ref 0.2–1.2)
BUN SERPL-MCNC: 16 MG/DL — SIGNIFICANT CHANGE UP (ref 7–23)
CALCIUM SERPL-MCNC: 8.4 MG/DL — SIGNIFICANT CHANGE UP (ref 8.4–10.5)
CHLORIDE SERPL-SCNC: 106 MMOL/L — SIGNIFICANT CHANGE UP (ref 98–107)
CO2 SERPL-SCNC: 23 MMOL/L — SIGNIFICANT CHANGE UP (ref 22–31)
CREAT SERPL-MCNC: 0.83 MG/DL — SIGNIFICANT CHANGE UP (ref 0.5–1.3)
EGFR: 77 ML/MIN/1.73M2 — SIGNIFICANT CHANGE UP
GLUCOSE SERPL-MCNC: 98 MG/DL — SIGNIFICANT CHANGE UP (ref 70–99)
HCT VFR BLD CALC: 41.3 % — SIGNIFICANT CHANGE UP (ref 39–50)
HGB BLD-MCNC: 13.4 G/DL — SIGNIFICANT CHANGE UP (ref 13–17)
MAGNESIUM SERPL-MCNC: 1.9 MG/DL — SIGNIFICANT CHANGE UP (ref 1.6–2.6)
MCHC RBC-ENTMCNC: 27.6 PG — SIGNIFICANT CHANGE UP (ref 27–34)
MCHC RBC-ENTMCNC: 32.4 GM/DL — SIGNIFICANT CHANGE UP (ref 32–36)
MCV RBC AUTO: 85 FL — SIGNIFICANT CHANGE UP (ref 80–100)
NRBC # BLD: 0 /100 WBCS — SIGNIFICANT CHANGE UP (ref 0–0)
NRBC # FLD: 0 K/UL — SIGNIFICANT CHANGE UP (ref 0–0)
PHOSPHATE SERPL-MCNC: 2.9 MG/DL — SIGNIFICANT CHANGE UP (ref 2.5–4.5)
PLATELET # BLD AUTO: 259 K/UL — SIGNIFICANT CHANGE UP (ref 150–400)
POTASSIUM SERPL-MCNC: 3.8 MMOL/L — SIGNIFICANT CHANGE UP (ref 3.5–5.3)
POTASSIUM SERPL-SCNC: 3.8 MMOL/L — SIGNIFICANT CHANGE UP (ref 3.5–5.3)
PROT SERPL-MCNC: 5.6 G/DL — LOW (ref 6–8.3)
RBC # BLD: 4.86 M/UL — SIGNIFICANT CHANGE UP (ref 4.2–5.8)
RBC # FLD: 17.5 % — HIGH (ref 10.3–14.5)
SODIUM SERPL-SCNC: 140 MMOL/L — SIGNIFICANT CHANGE UP (ref 135–145)
WBC # BLD: 5.61 K/UL — SIGNIFICANT CHANGE UP (ref 3.8–10.5)
WBC # FLD AUTO: 5.61 K/UL — SIGNIFICANT CHANGE UP (ref 3.8–10.5)

## 2023-03-13 RX ADMIN — Medication 975 MILLIGRAM(S): at 00:20

## 2023-03-13 RX ADMIN — PIPERACILLIN AND TAZOBACTAM 25 GRAM(S): 4; .5 INJECTION, POWDER, LYOPHILIZED, FOR SOLUTION INTRAVENOUS at 12:12

## 2023-03-13 RX ADMIN — Medication 975 MILLIGRAM(S): at 12:14

## 2023-03-13 RX ADMIN — RISPERIDONE 0.5 MILLIGRAM(S): 4 TABLET ORAL at 21:26

## 2023-03-13 RX ADMIN — CARVEDILOL PHOSPHATE 3.12 MILLIGRAM(S): 80 CAPSULE, EXTENDED RELEASE ORAL at 18:14

## 2023-03-13 RX ADMIN — PIPERACILLIN AND TAZOBACTAM 25 GRAM(S): 4; .5 INJECTION, POWDER, LYOPHILIZED, FOR SOLUTION INTRAVENOUS at 03:18

## 2023-03-13 RX ADMIN — TAMSULOSIN HYDROCHLORIDE 0.4 MILLIGRAM(S): 0.4 CAPSULE ORAL at 21:25

## 2023-03-13 RX ADMIN — Medication 975 MILLIGRAM(S): at 05:13

## 2023-03-13 RX ADMIN — Medication 6 MILLIGRAM(S): at 21:26

## 2023-03-13 RX ADMIN — SENNA PLUS 2 TABLET(S): 8.6 TABLET ORAL at 21:26

## 2023-03-13 RX ADMIN — Medication 975 MILLIGRAM(S): at 05:43

## 2023-03-13 RX ADMIN — CARVEDILOL PHOSPHATE 3.12 MILLIGRAM(S): 80 CAPSULE, EXTENDED RELEASE ORAL at 05:14

## 2023-03-13 RX ADMIN — PIPERACILLIN AND TAZOBACTAM 25 GRAM(S): 4; .5 INJECTION, POWDER, LYOPHILIZED, FOR SOLUTION INTRAVENOUS at 18:14

## 2023-03-13 RX ADMIN — ENOXAPARIN SODIUM 30 MILLIGRAM(S): 100 INJECTION SUBCUTANEOUS at 18:46

## 2023-03-13 RX ADMIN — Medication 975 MILLIGRAM(S): at 12:44

## 2023-03-13 NOTE — ED ADULT NURSE NOTE - CHIEF COMPLAINT
- Rheumatology consulted, labs WNL so far except elevated CPK - continue to monitor  - PRN analgesics provided  -MRI sacro-iliac joints (ankylosing spondylitis) and left hip(r/o labral tear) ordered; thoracic x-ray also ordered to evaluate for ankylosing spondylitis   The patient is a 89y Male complaining of hip pain/injury.

## 2023-03-13 NOTE — PATIENT PROFILE ADULT - FUNCTIONAL ASSESSMENT - DAILY ACTIVITY SCORE.
Head, normocephalic, atraumatic, Face, Face within normal limits, Ears, External ears within normal limits
12

## 2023-03-13 NOTE — PROGRESS NOTE ADULT - ATTENDING COMMENTS
Above noted. The patient appears to be tolerating a diet without experiencing abdominal pain.  Physical Exam: Afebrile.  Abdomen: Non-tender on deep palpation.  Plan: Advance diet. Discharge planning.

## 2023-03-13 NOTE — PATIENT PROFILE ADULT - FALL HARM RISK - HARM RISK INTERVENTIONS
Assistance with ambulation/Assistance OOB with selected safe patient handling equipment/Communicate Risk of Fall with Harm to all staff/Discuss with provider need for PT consult/Monitor for mental status changes/Monitor gait and stability/Move patient closer to nurses' station/Provide patient with walking aids - walker, cane, crutches/Reinforce activity limits and safety measures with patient and family/Reorient to person, place and time as needed/Tailored Fall Risk Interventions/Toileting schedule using arm’s reach rule for commode and bathroom/Use of alarms - bed, chair and/or voice tab/Visual Cue: Yellow wristband and red socks/Bed in lowest position, wheels locked, appropriate side rails in place/Call bell, personal items and telephone in reach/Instruct patient to call for assistance before getting out of bed or chair/Non-slip footwear when patient is out of bed/[place] to call system/Physically safe environment - no spills, clutter or unnecessary equipment/Purposeful Proactive Rounding/Room/bathroom lighting operational, light cord in reach

## 2023-03-14 RX ADMIN — CARVEDILOL PHOSPHATE 3.12 MILLIGRAM(S): 80 CAPSULE, EXTENDED RELEASE ORAL at 05:33

## 2023-03-14 RX ADMIN — Medication 975 MILLIGRAM(S): at 12:40

## 2023-03-14 RX ADMIN — RISPERIDONE 0.5 MILLIGRAM(S): 4 TABLET ORAL at 21:21

## 2023-03-14 RX ADMIN — SENNA PLUS 2 TABLET(S): 8.6 TABLET ORAL at 21:21

## 2023-03-14 RX ADMIN — TAMSULOSIN HYDROCHLORIDE 0.4 MILLIGRAM(S): 0.4 CAPSULE ORAL at 21:21

## 2023-03-14 RX ADMIN — Medication 975 MILLIGRAM(S): at 05:33

## 2023-03-14 RX ADMIN — Medication 975 MILLIGRAM(S): at 06:01

## 2023-03-14 RX ADMIN — Medication 6 MILLIGRAM(S): at 21:21

## 2023-03-14 RX ADMIN — CARVEDILOL PHOSPHATE 3.12 MILLIGRAM(S): 80 CAPSULE, EXTENDED RELEASE ORAL at 19:02

## 2023-03-14 RX ADMIN — PIPERACILLIN AND TAZOBACTAM 25 GRAM(S): 4; .5 INJECTION, POWDER, LYOPHILIZED, FOR SOLUTION INTRAVENOUS at 19:02

## 2023-03-14 RX ADMIN — Medication 975 MILLIGRAM(S): at 19:03

## 2023-03-14 RX ADMIN — ENOXAPARIN SODIUM 30 MILLIGRAM(S): 100 INJECTION SUBCUTANEOUS at 19:03

## 2023-03-14 RX ADMIN — PIPERACILLIN AND TAZOBACTAM 25 GRAM(S): 4; .5 INJECTION, POWDER, LYOPHILIZED, FOR SOLUTION INTRAVENOUS at 11:44

## 2023-03-14 RX ADMIN — PIPERACILLIN AND TAZOBACTAM 25 GRAM(S): 4; .5 INJECTION, POWDER, LYOPHILIZED, FOR SOLUTION INTRAVENOUS at 03:31

## 2023-03-14 RX ADMIN — Medication 975 MILLIGRAM(S): at 11:40

## 2023-03-15 ENCOUNTER — TRANSCRIPTION ENCOUNTER (OUTPATIENT)
Age: 88
End: 2023-03-15

## 2023-03-15 VITALS
OXYGEN SATURATION: 97 % | SYSTOLIC BLOOD PRESSURE: 119 MMHG | HEART RATE: 78 BPM | TEMPERATURE: 98 F | RESPIRATION RATE: 18 BRPM | DIASTOLIC BLOOD PRESSURE: 68 MMHG

## 2023-03-15 RX ADMIN — PIPERACILLIN AND TAZOBACTAM 25 GRAM(S): 4; .5 INJECTION, POWDER, LYOPHILIZED, FOR SOLUTION INTRAVENOUS at 02:51

## 2023-03-15 RX ADMIN — CARVEDILOL PHOSPHATE 3.12 MILLIGRAM(S): 80 CAPSULE, EXTENDED RELEASE ORAL at 05:34

## 2023-03-15 NOTE — DISCHARGE NOTE PROVIDER - NSDCCPCAREPLAN_GEN_ALL_CORE_FT
PRINCIPAL DISCHARGE DIAGNOSIS  Diagnosis: Acute cholecystitis  Assessment and Plan of Treatment:   3/10/23: CT Abdomen / Pelvis performed showing findings compatible with acute cholecystitis.  No drainable pericholecystic abscess.

## 2023-03-15 NOTE — DISCHARGE NOTE NURSING/CASE MANAGEMENT/SOCIAL WORK - NSDCVIVACCINE_GEN_ALL_CORE_FT
COVID-19, mRNA, LNP-S, PF, 30 mcg/0.3 mL dose (Pfizer); 27-Oct-2021 17:40; Julienne Gray (RN); [employer]; [id number] (Exp. Date: 11-Nov-2021); IntraMuscular; Deltoid Left.; 0.3 milliLiter(s);   Influenza, seasonal, injectable; 05-Jan-2014 10:59; Jose Juan Medina (CHARLENE); [id number]; IntraMuscular; Deltoid Left.; 0.5 milliLiter(s);   influenza, injectable, quadrivalent, preservative free; 10-Dec-2014 16:19; Crystal Treadwell (CHARLENE); [employer]; ; IntraMuscular; Deltoid Right.; 0.5 milliLiter(s);   influenza, injectable, quadrivalent, preservative free; 04-Oct-2019 13:43; Pollo De La Vega (RN); [employer];  (Exp. Date: 30-Jun-2020); IntraMuscular; Deltoid Left.; 0.5 milliLiter(s); VIS (VIS Published: 15-Aug-2019, VIS Presented: 04-Oct-2019);   pneumococcal polysaccharide PPV23; 05-Jan-2014 10:59; Jose Juan Medina (CHARLENE); [id number]; IntraMuscular; Deltoid Right.; 0.5 milliLiter(s);

## 2023-03-15 NOTE — DISCHARGE NOTE NURSING/CASE MANAGEMENT/SOCIAL WORK - NSDCPEFALRISK_GEN_ALL_CORE
For information on Fall & Injury Prevention, visit: [email or web address] OR  [email or web address] OR  https://www.cdc.gov/steadi/patient.html

## 2023-03-15 NOTE — PROGRESS NOTE ADULT - SUBJECTIVE AND OBJECTIVE BOX
SURGERY DAILY PROGRESS NOTE    --------------- OVERNIGHT/INTERVAL EVENTS ---------------  - Pulled out IV, placed on 1:1     --------------- SUBJECTIVE ---------------  - Patient seen and examined at bedside with surgical team    --------------- OBJECTIVE ---------------    -----VITALS-----  T(C): 36.3, Max: 36.7 (03-11)  HR: 94 (75 - 97)  BP: 114/65 (107/41 - 131/64)  RR: 17 (16 - 18)  SpO2: 99% (95% - 100%) room air        -----PHYSICAL EXAM-----  GENERAL: NAD, lying in bed   NEURO:  awake alert appropriate  HEENT: NCAT, trachea midline  PULM: Respirations non-labored  ABD: Soft, non-tender, non-distended, no peritonitis/rebound tenderness  EXT: Warm, well perfused     -----INs & OUTs-----    03-10  -  03-11  --------------------------------------------------------  IN:  Total IN: 0 mL    OUT:    Oral Fluid: 0 mL    Stool (mL): 2 mL    Voided (mL): 350 mL  Total OUT: 352 mL     -----MEDICATIONS-----  STANDING  acetaminophen     Tablet .. 975 milliGRAM(s) Oral every 6 hours  carvedilol 3.125 milliGRAM(s) Oral every 12 hours  enoxaparin Injectable 30 milliGRAM(s) SubCutaneous every 24 hours  lactated ringers. 1000 milliLiter(s) (100 mL/Hr) IV Continuous <Continuous>  LORazepam     Tablet 0.5 milliGRAM(s) Oral once  melatonin 6 milliGRAM(s) Oral at bedtime  piperacillin/tazobactam IVPB.. 3.375 Gram(s) IV Intermittent every 8 hours  risperiDONE   Tablet 0.5 milliGRAM(s) Oral at bedtime  senna 2 Tablet(s) Oral at bedtime  tamsulosin 0.4 milliGRAM(s) Oral at bedtime    PRN      -----LABS-----  140  |  103  |  10  ----------------------------<  109<H>    (03-10)  5.0   |  29  |  0.65            Ca    9.6      03-10  Mg    x  Phos  x              
SURGERY DAILY PROGRESS NOTE    --------------- OVERNIGHT/INTERVAL EVENTS ---------------  - Hypothermia, given warming blanket     --------------- SUBJECTIVE ---------------  - Patient seen and examined at bedside with surgical team    --------------- OBJECTIVE ---------------    -----VITALS-----  T(C): 36.3, Max: 36.7 (03-11)  HR: 94 (75 - 97)  BP: 114/65 (107/41 - 131/64)  RR: 17 (16 - 18)  SpO2: 99% (95% - 100%) room air        -----PHYSICAL EXAM-----  GENERAL: NAD, lying in bed   NEURO:  awake alert appropriate  HEENT: NCAT, trachea midline  PULM: Respirations non-labored  ABD: Soft, non-tender, non-distended, no peritonitis/rebound tenderness  EXT: Warm, well perfused     -----INs & OUTs-----    03-10  -  03-11  --------------------------------------------------------  IN:  Total IN: 0 mL    OUT:    Oral Fluid: 0 mL    Stool (mL): 2 mL    Voided (mL): 350 mL  Total OUT: 352 mL     -----MEDICATIONS-----  STANDING  acetaminophen     Tablet .. 975 milliGRAM(s) Oral every 6 hours  carvedilol 3.125 milliGRAM(s) Oral every 12 hours  enoxaparin Injectable 30 milliGRAM(s) SubCutaneous every 24 hours  lactated ringers. 1000 milliLiter(s) (100 mL/Hr) IV Continuous <Continuous>  LORazepam     Tablet 0.5 milliGRAM(s) Oral once  melatonin 6 milliGRAM(s) Oral at bedtime  piperacillin/tazobactam IVPB.. 3.375 Gram(s) IV Intermittent every 8 hours  risperiDONE   Tablet 0.5 milliGRAM(s) Oral at bedtime  senna 2 Tablet(s) Oral at bedtime  tamsulosin 0.4 milliGRAM(s) Oral at bedtime    PRN      -----LABS-----  140  |  103  |  10  ----------------------------<  109<H>    (03-10)  5.0   |  29  |  0.65            Ca    9.6      03-10  Mg    x  Phos  x              
SURGERY DAILY PROGRESS NOTE     --------------- SUBJECTIVE ---------------  Patient seen and examined at bedside with surgical team. No acute events overnight.     --------------- OBJECTIVE ---------------    -----VITALS-----  T(C): 36.7 (03-13-23 @ 10:00), Max: 36.7 (03-12-23 @ 22:00)  HR: 73 (03-13-23 @ 10:00) (73 - 95)  BP: 103/63 (03-13-23 @ 10:00) (94/63 - 118/69)  ABP: --  ABP(mean): --  RR: 17 (03-13-23 @ 10:00) (17 - 18)  SpO2: 100% (03-13-23 @ 10:00) (99% - 100%)  Wt(kg): --  CVP(mm Hg): --      03-12 @ 07:01  -  03-13 @ 07:00  --------------------------------------------------------  IN:    dextrose 5% + lactated ringers: 750 mL    IV PiggyBack: 200 mL    Lactated Ringers: 1200 mL    Oral Fluid: 720 mL  Total IN: 2870 mL    OUT:    Stool (mL): 0 mL    Voided (mL): 0 mL  Total OUT: 0 mL    Total NET: 2870 mL      -----PHYSICAL EXAM-----  GENERAL: NAD, lying in bed   PULM: Respirations non-labored  ABD: Soft, non-tender, non-distended,  EXT: Warm, well perfused      -----MEDICATIONS-----  STANDING  acetaminophen     Tablet .. 975 milliGRAM(s) Oral every 6 hours  carvedilol 3.125 milliGRAM(s) Oral every 12 hours  enoxaparin Injectable 30 milliGRAM(s) SubCutaneous every 24 hours  lactated ringers. 1000 milliLiter(s) (100 mL/Hr) IV Continuous <Continuous>  LORazepam     Tablet 0.5 milliGRAM(s) Oral once  melatonin 6 milliGRAM(s) Oral at bedtime  piperacillin/tazobactam IVPB.. 3.375 Gram(s) IV Intermittent every 8 hours  risperiDONE   Tablet 0.5 milliGRAM(s) Oral at bedtime  senna 2 Tablet(s) Oral at bedtime  tamsulosin 0.4 milliGRAM(s) Oral at bedtime    PRN      -----LABS-----    CBC (03-13 @ 06:59)                              13.4                           5.61    )----------------(  259        --    % Neutrophils, --    % Lymphocytes, ANC: --                                  41.3    CBC (03-12 @ 06:31)                              13.7                           4.70    )----------------(  305        --    % Neutrophils, --    % Lymphocytes, ANC: --                                  42.8      BMP (03-13 @ 06:59)             140     |  106     |  16    		Ca++ --      Ca 8.4                ---------------------------------( 98    		Mg 1.90               3.8     |  23      |  0.83  			Ph 2.9     BMP (03-12 @ 06:31)             138     |  105     |  14    		Ca++ --      Ca 8.8                ---------------------------------( 93    		Mg 1.90               4.2     |  19<L>   |  0.78  			Ph 3.6       LFTs (03-13 @ 06:59)      TPro 5.6<L> / Alb 2.5<L> / TBili 0.7 / DBili -- / AST 17 / ALT 7 / AlkPhos 96                       
Surgery Daily Progress Note  =====================================================  Interval / Overnight Events: No acute events overnight.      HPI:  Patient is a 89 year old male with a PMHx of dementia (A&O x1), HTN, AFib (on Eliquis), DVT, hx of cholecystitis (S/P IR perc 2019) and choledocholithiasis who presented for abdominal pain. (10 Mar 2023 22:45)      PAST MEDICAL & SURGICAL HISTORY:  DVT (deep venous thrombosis)  bilateral  Hypertension  Depression  Dementia  HLD (hyperlipidemia)  OA (osteoarthritis)  Cholelithiasis  A-fib  On Eliquis  S/P IVC filter      ALLERGIES:  No Known Allergies    --------------------------------------------------------------------------------------    MEDICATIONS:    Neurologic Medications  acetaminophen     Tablet .. 975 milliGRAM(s) Oral every 6 hours  melatonin 6 milliGRAM(s) Oral at bedtime  risperiDONE   Tablet 0.5 milliGRAM(s) Oral at bedtime    Cardiovascular Medications  carvedilol 3.125 milliGRAM(s) Oral every 12 hours    Gastrointestinal Medications  senna 2 Tablet(s) Oral at bedtime    Genitourinary Medications  tamsulosin 0.4 milliGRAM(s) Oral at bedtime    Hematologic/Oncologic Medications  enoxaparin Injectable 30 milliGRAM(s) SubCutaneous every 24 hours    Antimicrobial/Immunologic Medications  piperacillin/tazobactam IVPB.. 3.375 Gram(s) IV Intermittent every 8 hours    --------------------------------------------------------------------------------------    VITAL SIGNS:  T(C): 36.4 (15 Mar 2023 05:15), Max: 36.4 (14 Mar 2023 10:00)  T(F): 97.5 (15 Mar 2023 05:15), Max: 97.5 (14 Mar 2023 10:00)  HR: 78 (15 Mar 2023 05:15) (55 - 85)  BP: 119/68 (15 Mar 2023 05:15) (106/63 - 121/80)  RR: 18 (15 Mar 2023 05:15) (15 - 18)  SpO2: 97% (15 Mar 2023 05:15) (94% - 100%)    --------------------------------------------------------------------------------------    INS AND OUTS:    13 Mar 2023 07:01  -  14 Mar 2023 07:00  --------------------------------------------------------  IN:    dextrose 5% + lactated ringers: 350 mL    IV PiggyBack: 100 mL    Oral Fluid: 920 mL  Total IN: 1370 mL    OUT:    Incontinent per Condom Catheter (mL): 300 mL    Stool (mL): 0 mL    Voided (mL): 0 mL  Total OUT: 300 mL    Total NET: 1070 mL      14 Mar 2023 07:01  -  15 Mar 2023 06:56  --------------------------------------------------------  IN:    IV PiggyBack: 100 mL    Oral Fluid: 720 mL  Total IN: 820 mL    OUT:    Stool (mL): 0 mL    Voided (mL): 0 mL  Total OUT: 0 mL    Total NET: 820 mL    --------------------------------------------------------------------------------------    EXAM    NEUROLOGY  Exam: Normal, in no acute distress.    HEENT  Exam: Normocephalic, atraumatic.    RESPIRATORY  Exam: Normal expansion / effort.    CARDIOVASCULAR  Exam: S1, S2.  Regular rate and rhythm.    GI/NUTRITION  Exam: Abdomen soft, Non-tender, Non-distended.  Current Diet: Soft and bite sized    MUSCULOSKELETAL  Exam: All extremities moving spontaneously without limitations.      HEMATOLOGIC  [x] VTE Prophylaxis: enoxaparin Injectable 30 milliGRAM(s) SubCutaneous every 24 hours      INFECTIOUS DISEASE  Antimicrobials/Immunologic Medications:  piperacillin/tazobactam IVPB.. 3.375 Gram(s) IV Intermittent every 8 hours    --------------------------------------------------------------------------------------
Surgery Daily Progress Note  =====================================================  Interval / Overnight Events: No acute events overnight.  Seen and examined this AM.       HPI:  Patient is a 89 year old male with a PMHx of dementia (A&O x1), HTN, AFib (on Eliquis), DVT, hx of cholecystitis (S/P IR perc 2019) and choledocholithiasis who presented for abdominal pain. (10 Mar 2023 22:45)      PAST MEDICAL & SURGICAL HISTORY:  DVT (deep venous thrombosis)  bilateral  Hypertension  Depression  Dementia  HLD (hyperlipidemia)  OA (osteoarthritis)  Cholelithiasis  A-fib  On Eliquis  S/P IVC filter      ALLERGIES:  No Known Allergies    --------------------------------------------------------------------------------------    MEDICATIONS:    Neurologic Medications  acetaminophen     Tablet .. 975 milliGRAM(s) Oral every 6 hours  melatonin 6 milliGRAM(s) Oral at bedtime  risperiDONE   Tablet 0.5 milliGRAM(s) Oral at bedtime    Cardiovascular Medications  carvedilol 3.125 milliGRAM(s) Oral every 12 hours    Gastrointestinal Medications  senna 2 Tablet(s) Oral at bedtime    Genitourinary Medications  tamsulosin 0.4 milliGRAM(s) Oral at bedtime    Hematologic/Oncologic Medications  enoxaparin Injectable 30 milliGRAM(s) SubCutaneous every 24 hours    Antimicrobial/Immunologic Medications  piperacillin/tazobactam IVPB.. 3.375 Gram(s) IV Intermittent every 8 hours    --------------------------------------------------------------------------------------    VITAL SIGNS:  T(C): 36.4 (15 Mar 2023 05:15), Max: 36.4 (14 Mar 2023 10:00)  T(F): 97.5 (15 Mar 2023 05:15), Max: 97.5 (14 Mar 2023 10:00)  HR: 78 (15 Mar 2023 05:15) (55 - 85)  BP: 119/68 (15 Mar 2023 05:15) (106/63 - 121/80)  RR: 18 (15 Mar 2023 05:15) (15 - 18)  SpO2: 97% (15 Mar 2023 05:15) (94% - 100%)    --------------------------------------------------------------------------------------    INS AND OUTS:    13 Mar 2023 07:01  -  14 Mar 2023 07:00  --------------------------------------------------------  IN:    dextrose 5% + lactated ringers: 350 mL    IV PiggyBack: 100 mL    Oral Fluid: 920 mL  Total IN: 1370 mL    OUT:    Incontinent per Condom Catheter (mL): 300 mL    Stool (mL): 0 mL    Voided (mL): 0 mL  Total OUT: 300 mL    Total NET: 1070 mL      14 Mar 2023 07:01  -  15 Mar 2023 06:56  --------------------------------------------------------  IN:    IV PiggyBack: 100 mL    Oral Fluid: 720 mL  Total IN: 820 mL    OUT:    Stool (mL): 0 mL    Voided (mL): 0 mL  Total OUT: 0 mL    Total NET: 820 mL    --------------------------------------------------------------------------------------    EXAM    NEUROLOGY  Exam: Normal, in no acute distress.    HEENT  Exam: Normocephalic, atraumatic.    RESPIRATORY  Exam: Normal expansion / effort.    CARDIOVASCULAR  Exam: S1, S2.  Regular rate and rhythm.    GI/NUTRITION  Exam: Abdomen soft, Non-tender, Non-distended.  Current Diet: Soft and bite sized    MUSCULOSKELETAL  Exam: All extremities moving spontaneously without limitations.      HEMATOLOGIC  [x] VTE Prophylaxis: enoxaparin Injectable 30 milliGRAM(s) SubCutaneous every 24 hours      INFECTIOUS DISEASE  Antimicrobials/Immunologic Medications:  piperacillin/tazobactam IVPB.. 3.375 Gram(s) IV Intermittent every 8 hours    --------------------------------------------------------------------------------------

## 2023-03-15 NOTE — DISCHARGE NOTE PROVIDER - CARE PROVIDER_API CALL
Shimon Blandon)  Surgery  [address] [facility], [address] [place] [address]  Phone: [phone]  Fax: [phone]  Follow Up Time: 1 week

## 2023-03-15 NOTE — PROGRESS NOTE ADULT - ASSESSMENT
89yo M DNR/DNI with h/o dementia (AOx1), HTN, afib (on Eliquis), DVT, h/o cholecystitis s/p IR perc (2019) and choledocholithiasis presenting for abd pain with acute cholecystitis.    Plan:  - Diet - soft and bite sized low fat diet   - continue IV Zosyn  - Confirmed DNR/DNI status with daughter, however they are agreeable to IR eval if needed. Patient not a surgical candidate and daughter would not want surgery for him.  - Will attempt nonop management with antibiotics and reassess need for perc kinga if patient does not respond  - Resume home meds  - Hold home Eliquis     B Team Surgery   [id number] 
Patient is a 89 year old male with a PMHx of dementia (A&O x1), HTN, AFib (on Eliquis), DVT, hx of cholecystitis (S/P IR perc 2019) and choledocholithiasis who presented for abdominal pain.  CT Abdomen / Pelvis performed showing Findings compatible with acute cholecystitis. No drainable   pericholecystic abscess.
Patient is a 89 year old male with a PMHx of dementia (A&O x1), HTN, AFib (on Eliquis), DVT, hx of cholecystitis (S/P IR perc 2019) and choledocholithiasis who presented for abdominal pain.  CT Abdomen / Pelvis performed showing Findings compatible with acute cholecystitis. No drainable   pericholecystic abscess.    Plan:  - Diet - soft and bite sized low fat diet   - continue IV Zosyn  - Confirmed DNR/DNI status with daughter, however they are agreeable to IR eval if needed. Patient not a surgical candidate and daughter would not want surgery for him.  - Will attempt nonop management with antibiotics and reassess need for perc kinga if patient does not respond  - Resume home meds  - Hold home Eliquis     B Team Surgery   [id number] 
89yo M DNR/DNI with h/o dementia (AOx1), HTN, afib (on Eliquis), DVT, h/o cholecystitis s/p IR perc (2019) and choledocholithiasis presenting for abd pain with acute cholecystitis.    Plan:  - Ativan 0.5 q6r as needed for agitation  - Place new IV/draw AM labs  - NPO/IVF  - Zosyn  - Confirmed DNR/DNI status with daughter, however they are agreeable to IR eval if needed. Patient not a surgical candidate and daughter would not want surgery for him.  - Will attempt nonop management with antibiotics and reassess need for perc kinga if patient does not respond  - Resume home meds  - Hold home Eliquis     B Team Surgery   [id number] 
89yo M DNR/DNI with h/o dementia (AOx1), HTN, afib (on Eliquis), DVT, h/o cholecystitis s/p IR perc (2019) and choledocholithiasis presenting for abd pain with acute cholecystitis.    Plan:  - Ativan 0.5 q6r as needed for agitation  - Diet - low fat diet  - bedside dysphagia test  - Zosyn  - Confirmed DNR/DNI status with daughter, however they are agreeable to IR eval if needed. Patient not a surgical candidate and daughter would not want surgery for him.  - Will attempt nonop management with antibiotics and reassess need for perc kinga if patient does not respond  - Resume home meds  - Hold home Eliquis     B Team Surgery   [id number]

## 2023-03-15 NOTE — DISCHARGE NOTE NURSING/CASE MANAGEMENT/SOCIAL WORK - PATIENT PORTAL LINK FT
You can access the FollowMyHealth Patient Portal offered by [facility] by registering at the following website: [email or web address]. By joining [facility]’s FollowMyHealth portal, you will also be able to view your health information using other applications (apps) compatible with our system.

## 2023-03-15 NOTE — DISCHARGE NOTE PROVIDER - HOSPITAL COURSE
Patient is a 89 year old male with a PMHx of dementia (A&O x1), HTN, AFib (on Eliquis), DVT, cholecystitis (S/P IR perc 2019) and choledocholithiasis who presented with abdominal pain.    On 3/10 CT Abdomen / Pelvis performed showing findings compatible with acute cholecystitis.  No drainable pericholecystic abscess.  Patient was kept NPO with IV fluids.  He was started on IV Zosyn.  Physical therapy evaluated patient and recommended rehab.    On 3/12 patient was advanced to a soft and bite sized diet, which he tolerated well.    At the time of discharge, the patient was hemodynamically stable, was tolerating PO diet, was voiding urine and passing stool.  He was ambulating and was comfortable with adequate pain control.  The patient was instructed to follow up with Dr. Blandon within 1 week after discharge from the hospital.  The patient / family felt comfortable with discharge.  The patient had no other issues.    Per attending, patient deemed medically stable and ready for discharge at this time.

## 2023-03-15 NOTE — DISCHARGE NOTE PROVIDER - NSDCFUADDINST_GEN_ALL_CORE_FT
BATHING: You may shower and / or sponge bathe.  ACTIVITY: No heavy lifting or straining.  Otherwise, you may return to your usual level of physical activity.  If you are taking narcotic pain medication (such as Oxycodone or Percocet) DO NOT drive a car, operate machinery or make important decisions.  DIET: Resume a soft and bite sized diet.  NOTIFY YOUR SURGEON IF: You have any bleeding that does not stop, any fever (over 100.4 F) or chills, persistent nausea / vomiting, persistent diarrhea or if your pain is not controlled on your discharge pain medications.  FOLLOW-UP:   1. Please follow up with your primary care physician in one week regarding your hospitalization.    2. Please follow-up with your surgeon, Dr. Blandon within 7 days following discharge.  Please call [phone] to schedule an appointment.

## 2023-03-15 NOTE — DISCHARGE NOTE PROVIDER - NSDCMRMEDTOKEN_GEN_ALL_CORE_FT
acetaminophen 325 mg oral tablet: 2 tab(s) orally every 6 hours, As needed, Temp greater or equal to 38C (100.4F), Mild Pain (1 - 3)  apixaban 5 mg oral tablet: 1 tab(s) orally 2 times a day  ascorbic acid 500 mg oral tablet: 1 tab(s) orally once a day  Calcium 600+D oral tablet: 1 tab(s) orally once a day  carvedilol 3.125 mg oral tablet: 1 tab(s) orally every 12 hours  Melatonin 10 mg oral capsule: 1 cap(s) orally once a day (at bedtime), As Needed  Multiple Vitamins oral tablet: 1 tab(s) orally once a day  risperiDONE 0.5 mg oral tablet: 1 tab(s) orally once a day (at bedtime)  senna oral tablet: 2 tab(s) orally once a day (at bedtime)  tamsulosin 0.4 mg oral capsule: 1 cap(s) orally once a day (at bedtime)  Vitamin D3 25 mcg (1000 intl units) oral tablet: 1 tab(s) orally once a day   acetaminophen 325 mg oral tablet: 2 tab(s) orally every 6 hours, As needed, Temp greater or equal to 38C (100.4F), Mild Pain (1 - 3)  amoxicillin-clavulanate 875 mg-125 mg oral tablet: 1 tab(s) orally 2 times a day   apixaban 5 mg oral tablet: 1 tab(s) orally 2 times a day  ascorbic acid 500 mg oral tablet: 1 tab(s) orally once a day  Calcium 600+D oral tablet: 1 tab(s) orally once a day  carvedilol 3.125 mg oral tablet: 1 tab(s) orally every 12 hours  Melatonin 10 mg oral capsule: 1 cap(s) orally once a day (at bedtime), As Needed  Multiple Vitamins oral tablet: 1 tab(s) orally once a day  risperiDONE 0.5 mg oral tablet: 1 tab(s) orally once a day (at bedtime)  senna oral tablet: 2 tab(s) orally once a day (at bedtime)  tamsulosin 0.4 mg oral capsule: 1 cap(s) orally once a day (at bedtime)  Vitamin D3 25 mcg (1000 intl units) oral tablet: 1 tab(s) orally once a day

## 2023-03-15 NOTE — DISCHARGE NOTE NURSING/CASE MANAGEMENT/SOCIAL WORK - NSDCPNINST_GEN_ALL_CORE
follow up with primary care physician. Monitor for signs of infection such as pain, swelling, Temp greater than 100.4, Notify MD

## 2023-03-31 NOTE — DISCHARGE NOTE NURSING/CASE MANAGEMENT/SOCIAL WORK - NSDCPEPTCOWACOMP_GEN_ALL_CORE
Surgery does not appear to be an option given inability to localize a fourth gland either by imaging or direct visualization (during initial surgery).  Goal serum calcium below 10.5 with cinacalcet.    DXA shows mild osteopenia. Will hold off on repeating at this time as DXA is a poor predictor of fracture risk with advanced kidney disease.  He is now on sensipar 60 mg daily with low calcium  Will decrease Sensipar to 30 mg daily and repeat RFP in one month.  Will ask nephrology if he should also start phos binders with elevated phos on March labs   The patient has received written discharge instructions for Coumadin/Warfarin, including the Coumadin/Warfarin discharge booklet, which contains all of the information listed below. Coumadin/Warfarin is used to prevent new blood clots, and keep existing ones from getting bigger. Never skip a dose of Coumadin. If you forget to take your Coumadin/Warfarin, DO NOT take an extra pill to 'catch up'. Notify your doctor that you missed a dose.    NEVER TAKE DOUBLE DOSE    Take Coumadin/Warfarin in the evening at the same time    Coumadin/Warfarin may be taken with other medications or food

## 2023-05-24 NOTE — CONSULT NOTE ADULT - REASON FOR ADMISSION
Able to speak to pt. I provided address for health unit as will as directions. He states that he will follow up.  abdominal pain

## 2023-06-05 NOTE — ED ADULT NURSE NOTE - PRO INTERPRETER NEED 2
I spoke with Mrs. Nguyen and scheduled her an appointment with Miranda Davis on 6-12-23 to go over the results of her imaging, she indicated understanding.   English

## 2023-07-20 NOTE — ED ADULT TRIAGE NOTE - BP NONINVASIVE DIASTOLIC (MM HG)
86 Topical Steroids Applications Pregnancy And Lactation Text: Most topical steroids are considered safe to use during pregnancy and lactation.  Any topical steroid applied to the breast or nipple should be washed off before breastfeeding.

## 2023-12-25 NOTE — PROGRESS NOTE ADULT - SUBJECTIVE AND OBJECTIVE BOX
INTERVAL HPI/OVERNIGHT EVENTS:  Pt seen and examined at bedside.     Allergies/Intolerance: No Known Allergies      MEDICATIONS  (STANDING):  apixaban 5 milliGRAM(s) Oral every 12 hours  carvedilol 3.125 milliGRAM(s) Oral every 12 hours  escitalopram 10 milliGRAM(s) Oral daily  memantine 5 milliGRAM(s) Oral two times a day  polyethylene glycol 3350 17 Gram(s) Oral daily  senna 2 Tablet(s) Oral at bedtime  tamsulosin 0.4 milliGRAM(s) Oral at bedtime    MEDICATIONS  (PRN):  ALBUTerol    90 MICROgram(s) HFA Inhaler 1 Puff(s) Inhalation every 6 hours PRN Shortness of Breath and/or Wheezing  melatonin 3 milliGRAM(s) Oral at bedtime PRN Insomnia        ROS: all systems reviewed and wnl      PHYSICAL EXAMINATION:  Vital Signs Last 24 Hrs  T(C): 36.9 (17 Oct 2022 05:35), Max: 36.9 (17 Oct 2022 05:35)  T(F): 98.5 (17 Oct 2022 05:35), Max: 98.5 (17 Oct 2022 05:35)  HR: 88 (17 Oct 2022 05:35) (71 - 88)  BP: 133/70 (17 Oct 2022 05:35) (108/63 - 139/86)  BP(mean): --  RR: 18 (17 Oct 2022 05:35) (17 - 18)  SpO2: 96% (17 Oct 2022 05:35) (96% - 98%)    Parameters below as of 17 Oct 2022 05:35  Patient On (Oxygen Delivery Method): room air      CAPILLARY BLOOD GLUCOSE          10-16 @ 07:01  -  10-17 @ 07:00  --------------------------------------------------------  IN: 360 mL / OUT: 500 mL / NET: -140 mL        GENERAL: stable, on RA, comfortable, tolerates puree diet well.    NECK: supple, No JVD  CHEST/LUNG: clear to auscultation bilaterally; no rales, rhonchi, or wheezing b/l  HEART: normal S1, S2  ABDOMEN: BS+, soft, ND, NT   EXTREMITIES:  pulses palpable; no clubbing, cyanosis, or edema b/l LEs      LABS:    10-16    144  |  113<H>  |  13  ----------------------------<  107<H>  3.7   |  25  |  0.68    Ca    8.2<L>      16 Oct 2022 06:10                 normal for race

## 2024-03-25 NOTE — DISCHARGE NOTE NURSING/CASE MANAGEMENT/SOCIAL WORK - NSDPACMPNY_GEN_ALL_CORE
Additional Notes: Pt told to f/u with PCP for general surgeon referral.
Detail Level: Detailed
Render Risk Assessment In Note?: no
Traveling alone

## 2024-09-16 NOTE — ED ADULT NURSE NOTE - NSFALLRSKASSISTTYPE_ED_ALL_ED
Toileting/Standing/Walking PAST MEDICAL HISTORY:  Depression     HTN (hypertension)     Hypothyroid     Pyoderma gangrenosum     SLE (systemic lupus erythematosus)     Steroid-induced psychosis with complication

## 2024-09-26 NOTE — PATIENT PROFILE ADULT - NSPROSPHOSPCHAPLAINYN_GEN_A_NUR
Detail Level: Simple
Comment: FBSE - Benign findings today. Benign nevi, cherry angiomas, and SKs found on the body throughout. Treated one ISK on LT posterior upper arm with LN2, noticed by pt. For cyst on back of head, will refer to Dr. Bailey for excision. For chronic paronychia, well controlled with fluocinonide solution and Naftin. Sent prescription. If naftin is too expensive, will call for econazole instead. Reassured pt about [place] forme dermatosis on neck and to use alcohol wipes in that area. \\nFU FBSE 1 year.
Render Risk Assessment In Note?: no
no

## 2024-11-17 NOTE — ED ADULT NURSE REASSESSMENT NOTE - NS ED NURSE REASSESS COMMENT FT1
AAOx1, no signs of distress, pending bed placement, fall precautions maintained
AAOx1, no signs of distress, pending bed placement, fall precautions maintained
Andrey Gonzalez

## 2024-12-03 NOTE — PHYSICAL THERAPY INITIAL EVALUATION ADULT - PATIENT/FAMILY/SIGNIFICANT OTHER GOALS STATEMENT, PT EVAL
Instructions specifically for  from Dr. Ang Saavedra:  1.  Rest; sleep propped up or in an easy chair/recliner for the first 2-3 nights  2.  Apply antibiotic ointment to the nostrils with a Q-tip three times daily with Qtip  3.  Do not rub or blow your nose until after the splints have been removed  4.  Change your nasal drip pad twice a day and as needed for saturation; you can stop placing a drip pad once your nose no longer has bloody discharge  5.  For the first 2 to 3 days you may need to change the drip pad several times per day and at short intervals even once an hour  6.  If your bleeding becomes profuse, lean forward and allowed to flow out of your nose and come to the emergency department to be evaluated.  Have me or one of my colleagues contacted to examine you.  It is possible you will need a nasal cautery procedure.  Fortunately, this is very unlikely.  7.  Use saline spray or aerosol saline spray ([employer] or [employer] brand) to help clear the splints of blood/mucous/crusting (this will help with nasal congestion)  8.  Use the Afrin (oxymetazoline) nasal spray for increased bleeding  9.  No ibuprofen, naproxen or aspirin containing products until you are cleared to do so, as they may cause bleeding    For emergencies:  Ang Saavedra MD  Cell: [phone]        Pt states "I want to feel better."

## 2025-04-17 NOTE — ED ADULT TRIAGE NOTE - AS HEIGHT TYPE
actual Consent: Written consent obtained and the risks of skin tag removal was reviewed with the patient including but not limited to bleeding, pigmentary change, infection, pain, and remote possibility of scarring. Include Z78.9 (Other Specified Conditions Influencing Health Status) As An Associated Diagnosis?: No Total Number Of Lesions Treated: 5 Medical Necessity Information: It is in your best interest to select a reason for this procedure from the list below. All of these items fulfill various CMS LCD requirements except the new and changing color options. Detail Level: Detailed Add Associated Diagnoses If Applicable When Selecting Medical Necessity: Yes Medical Necessity Clause: This procedure was medically necessary because the lesions that were treated were:

## 2025-05-27 NOTE — DIETITIAN NUTRITION RISK NOTIFICATION - ETIOLOGY-BASIS
Please take nausea medication as prescribed to help out with appetite.  Return to ER for any emergent concerns.  As discussed, please wear your TLSO back brace as previously advised by neurosurgery.  Please search (how to wear TLSO back brace-Loc) for specific instructions.  Follow-up with PCP and neurosurgery as scheduled.  Follow-up with your cardiologist in relation to any chest pain.    
Chronic illness

## 2025-07-15 NOTE — DISCHARGE NOTE NURSING/CASE MANAGEMENT/SOCIAL WORK - NSSCTYPOFSERV_GEN_ALL_CORE
Quality 226: Preventive Care And Screening: Tobacco Use: Screening And Cessation Intervention: Patient screened for tobacco use and is an ex/non-smoker
Detail Level: Detailed
Nurse to call prior to visit.